# Patient Record
Sex: FEMALE | Race: WHITE | Employment: UNEMPLOYED | URBAN - METROPOLITAN AREA
[De-identification: names, ages, dates, MRNs, and addresses within clinical notes are randomized per-mention and may not be internally consistent; named-entity substitution may affect disease eponyms.]

---

## 2018-12-29 ENCOUNTER — APPOINTMENT (OUTPATIENT)
Dept: CT IMAGING | Age: 83
End: 2018-12-29
Payer: MEDICARE

## 2018-12-29 ENCOUNTER — HOSPITAL ENCOUNTER (EMERGENCY)
Age: 83
Discharge: HOME | End: 2018-12-29
Attending: FAMILY MEDICINE
Payer: MEDICARE

## 2018-12-29 ENCOUNTER — APPOINTMENT (OUTPATIENT)
Dept: GENERAL RADIOLOGY | Age: 83
End: 2018-12-29
Payer: MEDICARE

## 2018-12-29 VITALS
TEMPERATURE: 98 F | SYSTOLIC BLOOD PRESSURE: 140 MMHG | HEART RATE: 84 BPM | BODY MASS INDEX: 22.22 KG/M2 | WEIGHT: 150 LBS | DIASTOLIC BLOOD PRESSURE: 78 MMHG | OXYGEN SATURATION: 96 % | HEIGHT: 69 IN | RESPIRATION RATE: 18 BRPM

## 2018-12-29 DIAGNOSIS — S09.90XA CLOSED HEAD INJURY, INITIAL ENCOUNTER: Primary | ICD-10-CM
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
## 2018-12-29 DIAGNOSIS — S50.812A ABRASION OF LEFT FOREARM, INITIAL ENCOUNTER: ICD-10-CM

## 2018-12-29 DIAGNOSIS — S81.812A NONINFECTED SKIN TEAR OF LEFT LEG, INITIAL ENCOUNTER: ICD-10-CM

## 2018-12-29 DIAGNOSIS — S93.601A SPRAIN OF RIGHT FOOT, INITIAL ENCOUNTER: ICD-10-CM

## 2018-12-29 DIAGNOSIS — S81.811A LACERATION OF SKIN OF RIGHT LOWER LEG, INITIAL ENCOUNTER: ICD-10-CM

## 2018-12-29 DIAGNOSIS — S13.9XXA CERVICAL SPRAIN, INITIAL ENCOUNTER: ICD-10-CM

## 2018-12-29 PROCEDURE — 6370000000 HC RX 637 (ALT 250 FOR IP): Performed by: FAMILY MEDICINE

## 2018-12-29 PROCEDURE — 2500000003 HC RX 250 WO HCPCS

## 2018-12-29 PROCEDURE — 70450 CT HEAD/BRAIN W/O DYE: CPT

## 2018-12-29 PROCEDURE — 99284 EMERGENCY DEPT VISIT MOD MDM: CPT

## 2018-12-29 PROCEDURE — 72125 CT NECK SPINE W/O DYE: CPT

## 2018-12-29 PROCEDURE — 73630 X-RAY EXAM OF FOOT: CPT

## 2018-12-29 PROCEDURE — 6370000000 HC RX 637 (ALT 250 FOR IP)

## 2018-12-29 PROCEDURE — 73590 X-RAY EXAM OF LOWER LEG: CPT

## 2018-12-29 RX ORDER — DIAPER,BRIEF,INFANT-TODD,DISP
EACH MISCELLANEOUS ONCE
Status: DISCONTINUED | OUTPATIENT
Start: 2018-12-29 | End: 2018-12-29 | Stop reason: HOSPADM

## 2018-12-29 RX ORDER — ACETAMINOPHEN 500 MG
TABLET ORAL
Status: COMPLETED
Start: 2018-12-29 | End: 2018-12-29

## 2018-12-29 RX ORDER — LIDOCAINE HYDROCHLORIDE AND EPINEPHRINE 10; 10 MG/ML; UG/ML
INJECTION, SOLUTION INFILTRATION; PERINEURAL
Status: COMPLETED
Start: 2018-12-29 | End: 2018-12-29

## 2018-12-29 RX ORDER — DIAPER,BRIEF,INFANT-TODD,DISP
EACH MISCELLANEOUS ONCE
Status: COMPLETED | OUTPATIENT
Start: 2018-12-29 | End: 2018-12-29

## 2018-12-29 RX ADMIN — LIDOCAINE HYDROCHLORIDE,EPINEPHRINE BITARTRATE 20 ML: 10; .01 INJECTION, SOLUTION INFILTRATION; PERINEURAL at 13:30

## 2018-12-29 RX ADMIN — BACITRACIN ZINC 1 G: 500 OINTMENT TOPICAL at 13:37

## 2018-12-29 RX ADMIN — ACETAMINOPHEN 1000 MG: 500 TABLET ORAL at 14:15

## 2018-12-29 ASSESSMENT — PAIN DESCRIPTION - PROGRESSION
CLINICAL_PROGRESSION: NOT CHANGED
CLINICAL_PROGRESSION: GRADUALLY IMPROVING

## 2018-12-29 ASSESSMENT — PAIN DESCRIPTION - DESCRIPTORS
DESCRIPTORS: CONSTANT;ACHING
DESCRIPTORS: ACHING;DISCOMFORT;SHARP

## 2018-12-29 ASSESSMENT — PAIN DESCRIPTION - FREQUENCY: FREQUENCY: CONTINUOUS

## 2018-12-29 ASSESSMENT — PAIN DESCRIPTION - PAIN TYPE
TYPE: ACUTE PAIN
TYPE: ACUTE PAIN

## 2018-12-29 ASSESSMENT — PAIN SCALES - GENERAL
PAINLEVEL_OUTOF10: 8
PAINLEVEL_OUTOF10: 8
PAINLEVEL_OUTOF10: 6
PAINLEVEL_OUTOF10: 8

## 2018-12-29 ASSESSMENT — PAIN DESCRIPTION - ONSET
ONSET: SUDDEN
ONSET: GRADUAL

## 2018-12-29 ASSESSMENT — PAIN DESCRIPTION - LOCATION: LOCATION: GENERALIZED
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
## 2018-12-31 PROBLEM — S81.811A LACERATION OF SKIN OF RIGHT LOWER LEG: Status: ACTIVE | Noted: 2018-12-31

## 2018-12-31 PROBLEM — S09.90XA CLOSED HEAD INJURY: Status: ACTIVE | Noted: 2018-12-31

## 2018-12-31 PROBLEM — S13.9XXA CERVICAL SPRAIN, INITIAL ENCOUNTER: Status: ACTIVE | Noted: 2018-12-31

## 2018-12-31 PROBLEM — S81.812A NONINFECTED SKIN TEAR OF LEFT LEG, INITIAL ENCOUNTER: Status: ACTIVE | Noted: 2018-12-31

## 2018-12-31 PROBLEM — S93.601A SPRAIN OF RIGHT FOOT: Status: ACTIVE | Noted: 2018-12-31

## 2018-12-31 PROBLEM — S50.812A ABRASION OF LEFT FOREARM: Status: ACTIVE | Noted: 2018-12-31
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
## 2019-06-29 ENCOUNTER — APPOINTMENT (OUTPATIENT)
Dept: CT IMAGING | Age: 84
DRG: 493 | End: 2019-06-29
Payer: MEDICARE

## 2019-06-29 ENCOUNTER — APPOINTMENT (OUTPATIENT)
Dept: GENERAL RADIOLOGY | Age: 84
DRG: 493 | End: 2019-06-29
Payer: MEDICARE

## 2019-06-29 ENCOUNTER — HOSPITAL ENCOUNTER (INPATIENT)
Age: 84
LOS: 3 days | Discharge: NURSING, REHAB OR LONG-TERM CARE | DRG: 493 | End: 2019-07-02
Attending: EMERGENCY MEDICINE | Admitting: ORTHOPAEDIC SURGERY
Payer: MEDICARE

## 2019-06-29 DIAGNOSIS — S82.841A CLOSED BIMALLEOLAR FRACTURE OF RIGHT ANKLE, INITIAL ENCOUNTER: ICD-10-CM

## 2019-06-29 DIAGNOSIS — S09.90XA INJURY OF HEAD, INITIAL ENCOUNTER: ICD-10-CM

## 2019-06-29 DIAGNOSIS — T14.8XXA FRACTURE: ICD-10-CM
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
## 2019-06-29 DIAGNOSIS — S82.842A ANKLE FRACTURE, BIMALLEOLAR, CLOSED, LEFT, INITIAL ENCOUNTER: ICD-10-CM

## 2019-06-29 PROBLEM — S13.9XXA CERVICAL SPRAIN, INITIAL ENCOUNTER: Status: RESOLVED | Noted: 2018-12-31 | Resolved: 2019-06-29

## 2019-06-29 PROBLEM — S81.812A NONINFECTED SKIN TEAR OF LEFT LEG, INITIAL ENCOUNTER: Status: RESOLVED | Noted: 2018-12-31 | Resolved: 2019-06-29

## 2019-06-29 PROBLEM — S81.811A LACERATION OF SKIN OF RIGHT LOWER LEG: Status: RESOLVED | Noted: 2018-12-31 | Resolved: 2019-06-29

## 2019-06-29 PROBLEM — S93.601A SPRAIN OF RIGHT FOOT: Status: RESOLVED | Noted: 2018-12-31 | Resolved: 2019-06-29

## 2019-06-29 PROBLEM — S50.812A ABRASION OF LEFT FOREARM: Status: RESOLVED | Noted: 2018-12-31 | Resolved: 2019-06-29

## 2019-06-29 PROBLEM — I10 ESSENTIAL HYPERTENSION: Chronic | Status: ACTIVE | Noted: 2019-06-29

## 2019-06-29 PROBLEM — Z86.711 HISTORY OF PULMONARY EMBOLUS (PE): Chronic | Status: ACTIVE | Noted: 2019-06-29

## 2019-06-29 LAB
ABO/RH: NORMAL
ALBUMIN SERPL-MCNC: 3.8 G/DL (ref 3.5–5.2)
ALP BLD-CCNC: 60 U/L (ref 35–104)
ALT SERPL-CCNC: 15 U/L (ref 0–32)
ANION GAP SERPL CALCULATED.3IONS-SCNC: 11 MMOL/L (ref 7–16)
ANTIBODY SCREEN: NORMAL
APTT: 31.1 SEC (ref 24.5–35.1)
AST SERPL-CCNC: 22 U/L (ref 0–31)
BACTERIA: ABNORMAL /HPF
BASOPHILS ABSOLUTE: 0.03 E9/L (ref 0–0.2)
BASOPHILS RELATIVE PERCENT: 0.3 % (ref 0–2)
BILIRUB SERPL-MCNC: 1.2 MG/DL (ref 0–1.2)
BILIRUBIN URINE: NEGATIVE
BLOOD, URINE: ABNORMAL
BUN BLDV-MCNC: 15 MG/DL (ref 8–23)
CALCIUM SERPL-MCNC: 9 MG/DL (ref 8.6–10.2)
CHLORIDE BLD-SCNC: 103 MMOL/L (ref 98–107)
CLARITY: CLEAR
CO2: 27 MMOL/L (ref 22–29)
COLOR: YELLOW
CREAT SERPL-MCNC: 1.1 MG/DL (ref 0.5–1)
EOSINOPHILS ABSOLUTE: 0.12 E9/L (ref 0.05–0.5)
EOSINOPHILS RELATIVE PERCENT: 1.3 % (ref 0–6)
GFR AFRICAN AMERICAN: 57
GFR NON-AFRICAN AMERICAN: 47 ML/MIN/1.73
GLUCOSE BLD-MCNC: 94 MG/DL (ref 74–99)
GLUCOSE URINE: NEGATIVE MG/DL
HCT VFR BLD CALC: 35.6 % (ref 34–48)
HEMOGLOBIN: 11.9 G/DL (ref 11.5–15.5)
IMMATURE GRANULOCYTES #: 0.04 E9/L
IMMATURE GRANULOCYTES %: 0.4 % (ref 0–5)
INR BLD: 1.1
KETONES, URINE: ABNORMAL MG/DL
LEUKOCYTE ESTERASE, URINE: NEGATIVE
LYMPHOCYTES ABSOLUTE: 2.2 E9/L (ref 1.5–4)
LYMPHOCYTES RELATIVE PERCENT: 24.3 % (ref 20–42)
MCH RBC QN AUTO: 32.6 PG (ref 26–35)
MCHC RBC AUTO-ENTMCNC: 33.4 % (ref 32–34.5)
MCV RBC AUTO: 97.5 FL (ref 80–99.9)
MONOCYTES ABSOLUTE: 0.91 E9/L (ref 0.1–0.95)
MONOCYTES RELATIVE PERCENT: 10 % (ref 2–12)
NEUTROPHILS ABSOLUTE: 5.77 E9/L (ref 1.8–7.3)
NEUTROPHILS RELATIVE PERCENT: 63.7 % (ref 43–80)
NITRITE, URINE: NEGATIVE
PDW BLD-RTO: 14.6 FL (ref 11.5–15)
PH UA: 5.5 (ref 5–9)
PLATELET # BLD: 158 E9/L (ref 130–450)
PMV BLD AUTO: 9.8 FL (ref 7–12)
POTASSIUM REFLEX MAGNESIUM: 4.1 MMOL/L (ref 3.5–5)
PROTEIN UA: NEGATIVE MG/DL
PROTHROMBIN TIME: 13.1 SEC (ref 9.3–12.4)
RBC # BLD: 3.65 E12/L (ref 3.5–5.5)
RBC UA: ABNORMAL /HPF (ref 0–2)
SODIUM BLD-SCNC: 141 MMOL/L (ref 132–146)
SPECIFIC GRAVITY UA: 1.01 (ref 1–1.03)
TOTAL PROTEIN: 6.3 G/DL (ref 6.4–8.3)
TROPONIN: <0.01 NG/ML (ref 0–0.03)
UROBILINOGEN, URINE: 0.2 E.U./DL
WBC # BLD: 9.1 E9/L (ref 4.5–11.5)
WBC UA: ABNORMAL /HPF (ref 0–5)

## 2019-06-29 PROCEDURE — 85730 THROMBOPLASTIN TIME PARTIAL: CPT

## 2019-06-29 PROCEDURE — 73590 X-RAY EXAM OF LOWER LEG: CPT

## 2019-06-29 PROCEDURE — 2500000003 HC RX 250 WO HCPCS

## 2019-06-29 PROCEDURE — 86850 RBC ANTIBODY SCREEN: CPT

## 2019-06-29 PROCEDURE — 72125 CT NECK SPINE W/O DYE: CPT

## 2019-06-29 PROCEDURE — 86901 BLOOD TYPING SEROLOGIC RH(D): CPT

## 2019-06-29 PROCEDURE — 80053 COMPREHEN METABOLIC PANEL: CPT

## 2019-06-29 PROCEDURE — 87088 URINE BACTERIA CULTURE: CPT

## 2019-06-29 PROCEDURE — 85025 COMPLETE CBC W/AUTO DIFF WBC: CPT

## 2019-06-29 PROCEDURE — 6370000000 HC RX 637 (ALT 250 FOR IP): Performed by: INTERNAL MEDICINE

## 2019-06-29 PROCEDURE — 96375 TX/PRO/DX INJ NEW DRUG ADDON: CPT

## 2019-06-29 PROCEDURE — 99285 EMERGENCY DEPT VISIT HI MDM: CPT

## 2019-06-29 PROCEDURE — 2580000003 HC RX 258: Performed by: INTERNAL MEDICINE

## 2019-06-29 PROCEDURE — 93005 ELECTROCARDIOGRAM TRACING: CPT | Performed by: EMERGENCY MEDICINE

## 2019-06-29 PROCEDURE — 81001 URINALYSIS AUTO W/SCOPE: CPT

## 2019-06-29 PROCEDURE — 6360000002 HC RX W HCPCS: Performed by: STUDENT IN AN ORGANIZED HEALTH CARE EDUCATION/TRAINING PROGRAM

## 2019-06-29 PROCEDURE — 86900 BLOOD TYPING SEROLOGIC ABO: CPT

## 2019-06-29 PROCEDURE — 70450 CT HEAD/BRAIN W/O DYE: CPT

## 2019-06-29 PROCEDURE — 85610 PROTHROMBIN TIME: CPT

## 2019-06-29 PROCEDURE — 6360000002 HC RX W HCPCS: Performed by: INTERNAL MEDICINE

## 2019-06-29 PROCEDURE — 84484 ASSAY OF TROPONIN QUANT: CPT

## 2019-06-29 PROCEDURE — 96374 THER/PROPH/DIAG INJ IV PUSH: CPT

## 2019-06-29 PROCEDURE — 6360000002 HC RX W HCPCS: Performed by: EMERGENCY MEDICINE

## 2019-06-29 PROCEDURE — 36415 COLL VENOUS BLD VENIPUNCTURE: CPT

## 2019-06-29 PROCEDURE — 1200000000 HC SEMI PRIVATE

## 2019-06-29 PROCEDURE — 73610 X-RAY EXAM OF ANKLE: CPT

## 2019-06-29 PROCEDURE — 71045 X-RAY EXAM CHEST 1 VIEW: CPT

## 2019-06-29 RX ORDER — OXYCODONE HYDROCHLORIDE AND ACETAMINOPHEN 5; 325 MG/1; MG/1
1 TABLET ORAL EVERY 4 HOURS PRN
Status: DISCONTINUED | OUTPATIENT
Start: 2019-06-29 | End: 2019-06-29

## 2019-06-29 RX ORDER — TRAMADOL HYDROCHLORIDE 50 MG/1
50 TABLET ORAL EVERY 6 HOURS PRN
Status: DISCONTINUED | OUTPATIENT
Start: 2019-06-29 | End: 2019-07-02 | Stop reason: HOSPADM

## 2019-06-29 RX ORDER — LIDOCAINE HYDROCHLORIDE 10 MG/ML
INJECTION, SOLUTION EPIDURAL; INFILTRATION; INTRACAUDAL; PERINEURAL
Status: COMPLETED
Start: 2019-06-29 | End: 2019-06-29

## 2019-06-29 RX ORDER — TRAMADOL HYDROCHLORIDE 50 MG/1
100 TABLET ORAL EVERY 6 HOURS PRN
Status: DISCONTINUED | OUTPATIENT
Start: 2019-06-29 | End: 2019-07-02 | Stop reason: HOSPADM

## 2019-06-29 RX ORDER — CHOLECALCIFEROL (VITAMIN D3) 1250 MCG
1 CAPSULE ORAL
COMMUNITY
End: 2019-07-29 | Stop reason: SDUPTHER

## 2019-06-29 RX ORDER — ACETAMINOPHEN 325 MG/1
650 TABLET ORAL EVERY 4 HOURS PRN
Status: DISCONTINUED | OUTPATIENT
Start: 2019-06-29 | End: 2019-07-02 | Stop reason: HOSPADM

## 2019-06-29 RX ORDER — PREDNISONE 1 MG/1
5 TABLET ORAL DAILY
Status: DISCONTINUED | OUTPATIENT
Start: 2019-06-29 | End: 2019-07-02 | Stop reason: HOSPADM

## 2019-06-29 RX ORDER — DOCUSATE SODIUM 100 MG/1
100 CAPSULE, LIQUID FILLED ORAL 2 TIMES DAILY
Status: DISCONTINUED | OUTPATIENT
Start: 2019-06-29 | End: 2019-07-02 | Stop reason: HOSPADM

## 2019-06-29 RX ORDER — PANTOPRAZOLE SODIUM 40 MG/1
40 TABLET, DELAYED RELEASE ORAL DAILY
Status: DISCONTINUED | OUTPATIENT
Start: 2019-06-29 | End: 2019-07-02 | Stop reason: HOSPADM

## 2019-06-29 RX ORDER — SODIUM CHLORIDE 0.9 % (FLUSH) 0.9 %
10 SYRINGE (ML) INJECTION EVERY 12 HOURS SCHEDULED
Status: DISCONTINUED | OUTPATIENT
Start: 2019-06-29 | End: 2019-07-02 | Stop reason: HOSPADM

## 2019-06-29 RX ORDER — DIPHENHYDRAMINE HCL 12.5MG/5ML
25 LIQUID (ML) ORAL EVERY 6 HOURS PRN
Status: DISCONTINUED | OUTPATIENT
Start: 2019-06-29 | End: 2019-07-02 | Stop reason: HOSPADM

## 2019-06-29 RX ORDER — SULFAMETHOXAZOLE AND TRIMETHOPRIM 800; 160 MG/1; MG/1
1 TABLET ORAL EVERY OTHER DAY
Status: DISCONTINUED | OUTPATIENT
Start: 2019-06-29 | End: 2019-06-29

## 2019-06-29 RX ORDER — FENTANYL CITRATE 50 UG/ML
25 INJECTION, SOLUTION INTRAMUSCULAR; INTRAVENOUS ONCE
Status: COMPLETED | OUTPATIENT
Start: 2019-06-29 | End: 2019-06-29

## 2019-06-29 RX ORDER — CITALOPRAM 20 MG/1
20 TABLET ORAL DAILY
Status: DISCONTINUED | OUTPATIENT
Start: 2019-06-29 | End: 2019-07-02 | Stop reason: HOSPADM

## 2019-06-29 RX ORDER — SODIUM CHLORIDE 0.9 % (FLUSH) 0.9 %
10 SYRINGE (ML) INJECTION PRN
Status: DISCONTINUED | OUTPATIENT
Start: 2019-06-29 | End: 2019-07-02 | Stop reason: HOSPADM

## 2019-06-29 RX ORDER — SULFAMETHOXAZOLE AND TRIMETHOPRIM 800; 160 MG/1; MG/1
1 TABLET ORAL EVERY OTHER DAY
COMMUNITY
End: 2019-09-06

## 2019-06-29 RX ORDER — LISINOPRIL 10 MG/1
10 TABLET ORAL DAILY
Status: DISCONTINUED | OUTPATIENT
Start: 2019-06-29 | End: 2019-07-02 | Stop reason: HOSPADM

## 2019-06-29 RX ORDER — ONDANSETRON 2 MG/ML
4 INJECTION INTRAMUSCULAR; INTRAVENOUS EVERY 6 HOURS PRN
Status: DISCONTINUED | OUTPATIENT
Start: 2019-06-29 | End: 2019-07-02 | Stop reason: HOSPADM

## 2019-06-29 RX ORDER — MIDAZOLAM HYDROCHLORIDE 1 MG/ML
2 INJECTION INTRAMUSCULAR; INTRAVENOUS ONCE
Status: COMPLETED | OUTPATIENT
Start: 2019-06-29 | End: 2019-06-29

## 2019-06-29 RX ORDER — OXYCODONE HYDROCHLORIDE AND ACETAMINOPHEN 5; 325 MG/1; MG/1
2 TABLET ORAL EVERY 4 HOURS PRN
Status: DISCONTINUED | OUTPATIENT
Start: 2019-06-29 | End: 2019-06-29

## 2019-06-29 RX ORDER — LIDOCAINE HYDROCHLORIDE 10 MG/ML
20 INJECTION, SOLUTION EPIDURAL; INFILTRATION; INTRACAUDAL; PERINEURAL SEE ADMIN INSTRUCTIONS
Status: DISCONTINUED | OUTPATIENT
Start: 2019-06-29 | End: 2019-07-02 | Stop reason: HOSPADM

## 2019-06-29 RX ADMIN — TRAMADOL HYDROCHLORIDE 100 MG: 50 TABLET, FILM COATED ORAL at 22:37

## 2019-06-29 RX ADMIN — LIDOCAINE HYDROCHLORIDE 20 ML: 10 INJECTION, SOLUTION EPIDURAL; INFILTRATION; INTRACAUDAL; PERINEURAL at 12:05

## 2019-06-29 RX ADMIN — ACETAMINOPHEN 650 MG: 325 TABLET, FILM COATED ORAL at 23:59

## 2019-06-29 RX ADMIN — Medication 10 ML: at 20:24

## 2019-06-29 RX ADMIN — PREDNISONE 5 MG: 5 TABLET ORAL at 20:23

## 2019-06-29 RX ADMIN — ENOXAPARIN SODIUM 40 MG: 40 INJECTION SUBCUTANEOUS at 16:37

## 2019-06-29 RX ADMIN — DIPHENHYDRAMINE HYDROCHLORIDE 25 MG: 25 SOLUTION ORAL at 22:37

## 2019-06-29 RX ADMIN — FENTANYL CITRATE 25 MCG: 0.05 INJECTION, SOLUTION INTRAMUSCULAR; INTRAVENOUS at 10:42

## 2019-06-29 RX ADMIN — CITALOPRAM 20 MG: 20 TABLET, FILM COATED ORAL at 20:23

## 2019-06-29 RX ADMIN — MIDAZOLAM 2 MG: 1 INJECTION INTRAMUSCULAR; INTRAVENOUS at 12:00

## 2019-06-29 RX ADMIN — PANTOPRAZOLE SODIUM 40 MG: 40 TABLET, DELAYED RELEASE ORAL at 16:37

## 2019-06-29 RX ADMIN — ACETAMINOPHEN 650 MG: 325 TABLET, FILM COATED ORAL at 16:43

## 2019-06-29 RX ADMIN — DOCUSATE SODIUM 100 MG: 100 CAPSULE, LIQUID FILLED ORAL at 20:24

## 2019-06-29 ASSESSMENT — PAIN SCALES - GENERAL
PAINLEVEL_OUTOF10: 7
PAINLEVEL_OUTOF10: 0
PAINLEVEL_OUTOF10: 7
PAINLEVEL_OUTOF10: 7
PAINLEVEL_OUTOF10: 9
PAINLEVEL_OUTOF10: 0
PAINLEVEL_OUTOF10: 5
PAINLEVEL_OUTOF10: 9

## 2019-06-29 ASSESSMENT — ENCOUNTER SYMPTOMS
COUGH: 0
BLOOD IN STOOL: 0
NAUSEA: 0
ABDOMINAL PAIN: 0
VOMITING: 0
RHINORRHEA: 0
DIARRHEA: 0
BACK PAIN: 0
CONSTIPATION: 0
SHORTNESS OF BREATH: 0

## 2019-06-29 ASSESSMENT — PAIN DESCRIPTION - PAIN TYPE
TYPE: ACUTE PAIN

## 2019-06-29 ASSESSMENT — PAIN DESCRIPTION - FREQUENCY
FREQUENCY: CONTINUOUS

## 2019-06-29 ASSESSMENT — PAIN DESCRIPTION - ORIENTATION
ORIENTATION: RIGHT

## 2019-06-29 ASSESSMENT — PAIN DESCRIPTION - LOCATION
LOCATION: ANKLE

## 2019-06-29 ASSESSMENT — PAIN DESCRIPTION - DESCRIPTORS
DESCRIPTORS: ACHING;CONSTANT;DISCOMFORT
DESCRIPTORS: ACHING;DULL;DISCOMFORT
DESCRIPTORS: ACHING;CONSTANT;DISCOMFORT

## 2019-06-29 ASSESSMENT — PAIN DESCRIPTION - ONSET
ONSET: ON-GOING
ONSET: ON-GOING

## 2019-06-29 NOTE — CONSULTS
perfused  Sensation grossly intact superficial/deep peroneal,saphenous,sural,tibial n. distributions  · +GS/TA/EHL    Secondary Exam:   · bilateralUE: No obvious signs of trauma. No tenderness to palpation. Patient states left arm is sore due to IV placement. No deformities noted    · leftLE: No obvious signs of trauma. No tenderness to palpation. Negative logroll. No deformities noted    · Pelvis: -TTP, -Log roll, -Heel strike     DATA:    CBC:   Lab Results   Component Value Date    WBC 12.6 10/22/2016    RBC 3.98 10/22/2016    HGB 12.4 10/22/2016    HCT 37.4 10/22/2016    MCV 94.2 10/22/2016    MCH 31.3 10/22/2016    MCHC 33.2 10/22/2016    RDW 14.9 10/22/2016     10/22/2016    MPV 8.1 10/22/2016     PT/INR:    Lab Results   Component Value Date    PROTIME 12.1 10/21/2016    PROTIME 31.6 06/15/2016    INR 1.1 10/21/2016       Radiology Review:  06/29/19 - XR right ankle- oblique fracture of distal fibula at the level of the tibial plafond. Oblique to transverse medial male fracture. There is lateral subluxation of the talus and increased medial clear space    IMPRESSION:   ·  Closed, Right Bimalleolar Ankle Fracture    PLAN:  After informed consent was verbally obtained. Ankle underwent closed reduction with application of well padded 3-sided posterior splint. Neurovascular status was unchanged  Non-weight bearing to injured extremity  Pain medication per ED  Continue ice and elevation to decrease swelling  Medical admit   Nothing by mouth after midnight  Treatment consent  Preoperative labs and imaging  Plan for or 6/29/19  · Discuss with Dr. Minoo Higgins    Patient seen and examined  As above  See dictated Note. Thank Rand Ballesteros MD  Risk, benefits and options discussed with patient. she has verbalized understanding of options.  The possibility of complications were also discussed to include but not limited to nerve damage, infection, problems with healing, vascular injury, chronic pain, stiffness,

## 2019-06-30 ENCOUNTER — ANESTHESIA EVENT (OUTPATIENT)
Dept: OPERATING ROOM | Age: 84
DRG: 493 | End: 2019-06-30
Payer: MEDICARE

## 2019-06-30 ENCOUNTER — ANESTHESIA (OUTPATIENT)
Dept: OPERATING ROOM | Age: 84
DRG: 493 | End: 2019-06-30
Payer: MEDICARE

## 2019-06-30 ENCOUNTER — APPOINTMENT (OUTPATIENT)
Dept: GENERAL RADIOLOGY | Age: 84
DRG: 493 | End: 2019-06-30
Payer: MEDICARE

## 2019-06-30 VITALS — DIASTOLIC BLOOD PRESSURE: 78 MMHG | TEMPERATURE: 97.9 F | OXYGEN SATURATION: 100 % | SYSTOLIC BLOOD PRESSURE: 154 MMHG

## 2019-06-30 LAB
EKG ATRIAL RATE: 73 BPM
EKG P AXIS: 54 DEGREES
EKG P-R INTERVAL: 172 MS
EKG Q-T INTERVAL: 386 MS
EKG QRS DURATION: 86 MS
EKG QTC CALCULATION (BAZETT): 425 MS
EKG R AXIS: 6 DEGREES
EKG T AXIS: 7 DEGREES
EKG VENTRICULAR RATE: 73 BPM

## 2019-06-30 PROCEDURE — 6360000002 HC RX W HCPCS: Performed by: STUDENT IN AN ORGANIZED HEALTH CARE EDUCATION/TRAINING PROGRAM

## 2019-06-30 PROCEDURE — 2500000003 HC RX 250 WO HCPCS

## 2019-06-30 PROCEDURE — 7100000001 HC PACU RECOVERY - ADDTL 15 MIN: Performed by: ORTHOPAEDIC SURGERY

## 2019-06-30 PROCEDURE — 0QSG04Z REPOSITION RIGHT TIBIA WITH INTERNAL FIXATION DEVICE, OPEN APPROACH: ICD-10-PCS | Performed by: ORTHOPAEDIC SURGERY

## 2019-06-30 PROCEDURE — 2500000003 HC RX 250 WO HCPCS: Performed by: ORTHOPAEDIC SURGERY

## 2019-06-30 PROCEDURE — 3600000015 HC SURGERY LEVEL 5 ADDTL 15MIN: Performed by: ORTHOPAEDIC SURGERY

## 2019-06-30 PROCEDURE — 6370000000 HC RX 637 (ALT 250 FOR IP): Performed by: STUDENT IN AN ORGANIZED HEALTH CARE EDUCATION/TRAINING PROGRAM

## 2019-06-30 PROCEDURE — 6360000002 HC RX W HCPCS

## 2019-06-30 PROCEDURE — 6360000002 HC RX W HCPCS: Performed by: INTERNAL MEDICINE

## 2019-06-30 PROCEDURE — 2580000003 HC RX 258

## 2019-06-30 PROCEDURE — 2580000003 HC RX 258: Performed by: STUDENT IN AN ORGANIZED HEALTH CARE EDUCATION/TRAINING PROGRAM

## 2019-06-30 PROCEDURE — C1713 ANCHOR/SCREW BN/BN,TIS/BN: HCPCS | Performed by: ORTHOPAEDIC SURGERY

## 2019-06-30 PROCEDURE — 1200000000 HC SEMI PRIVATE

## 2019-06-30 PROCEDURE — 3700000000 HC ANESTHESIA ATTENDED CARE: Performed by: ORTHOPAEDIC SURGERY

## 2019-06-30 PROCEDURE — 6360000002 HC RX W HCPCS: Performed by: NURSE ANESTHETIST, CERTIFIED REGISTERED

## 2019-06-30 PROCEDURE — 2580000003 HC RX 258: Performed by: INTERNAL MEDICINE

## 2019-06-30 PROCEDURE — 93010 ELECTROCARDIOGRAM REPORT: CPT | Performed by: INTERNAL MEDICINE

## 2019-06-30 PROCEDURE — 2709999900 HC NON-CHARGEABLE SUPPLY: Performed by: ORTHOPAEDIC SURGERY

## 2019-06-30 PROCEDURE — 6370000000 HC RX 637 (ALT 250 FOR IP): Performed by: INTERNAL MEDICINE

## 2019-06-30 PROCEDURE — 3700000001 HC ADD 15 MINUTES (ANESTHESIA): Performed by: ORTHOPAEDIC SURGERY

## 2019-06-30 PROCEDURE — 3600000005 HC SURGERY LEVEL 5 BASE: Performed by: ORTHOPAEDIC SURGERY

## 2019-06-30 PROCEDURE — 2720000010 HC SURG SUPPLY STERILE: Performed by: ORTHOPAEDIC SURGERY

## 2019-06-30 PROCEDURE — 3209999900 FLUORO FOR SURGICAL PROCEDURES

## 2019-06-30 PROCEDURE — 6360000002 HC RX W HCPCS: Performed by: ANESTHESIOLOGY

## 2019-06-30 PROCEDURE — 73610 X-RAY EXAM OF ANKLE: CPT

## 2019-06-30 PROCEDURE — 7100000000 HC PACU RECOVERY - FIRST 15 MIN: Performed by: ORTHOPAEDIC SURGERY

## 2019-06-30 DEVICE — SCREW BNE L85MM DIA3.5MM CORT S STL ST FULL THRD HEX RECESS: Type: IMPLANTABLE DEVICE | Site: TIBIA | Status: FUNCTIONAL

## 2019-06-30 RX ORDER — PROMETHAZINE HYDROCHLORIDE 25 MG/ML
6.25 INJECTION, SOLUTION INTRAMUSCULAR; INTRAVENOUS
Status: DISCONTINUED | OUTPATIENT
Start: 2019-06-30 | End: 2019-06-30 | Stop reason: HOSPADM

## 2019-06-30 RX ORDER — LIDOCAINE HYDROCHLORIDE 20 MG/ML
INJECTION, SOLUTION INTRAVENOUS PRN
Status: DISCONTINUED | OUTPATIENT
Start: 2019-06-30 | End: 2019-06-30 | Stop reason: SDUPTHER

## 2019-06-30 RX ORDER — GLYCOPYRROLATE 1 MG/5 ML
SYRINGE (ML) INTRAVENOUS PRN
Status: DISCONTINUED | OUTPATIENT
Start: 2019-06-30 | End: 2019-06-30 | Stop reason: SDUPTHER

## 2019-06-30 RX ORDER — PHENYLEPHRINE HYDROCHLORIDE 10 MG/ML
INJECTION INTRAVENOUS PRN
Status: DISCONTINUED | OUTPATIENT
Start: 2019-06-30 | End: 2019-06-30 | Stop reason: SDUPTHER

## 2019-06-30 RX ORDER — CEFAZOLIN SODIUM 1 G/3ML
INJECTION, POWDER, FOR SOLUTION INTRAMUSCULAR; INTRAVENOUS PRN
Status: DISCONTINUED | OUTPATIENT
Start: 2019-06-30 | End: 2019-06-30 | Stop reason: SDUPTHER

## 2019-06-30 RX ORDER — ROCURONIUM BROMIDE 10 MG/ML
INJECTION, SOLUTION INTRAVENOUS PRN
Status: DISCONTINUED | OUTPATIENT
Start: 2019-06-30 | End: 2019-06-30 | Stop reason: SDUPTHER

## 2019-06-30 RX ORDER — DEXAMETHASONE SODIUM PHOSPHATE 10 MG/ML
INJECTION INTRAMUSCULAR; INTRAVENOUS PRN
Status: DISCONTINUED | OUTPATIENT
Start: 2019-06-30 | End: 2019-06-30 | Stop reason: SDUPTHER

## 2019-06-30 RX ORDER — PROPOFOL 10 MG/ML
INJECTION, EMULSION INTRAVENOUS PRN
Status: DISCONTINUED | OUTPATIENT
Start: 2019-06-30 | End: 2019-06-30 | Stop reason: SDUPTHER

## 2019-06-30 RX ORDER — DIPHENHYDRAMINE HYDROCHLORIDE 50 MG/ML
12.5 INJECTION INTRAMUSCULAR; INTRAVENOUS
Status: DISCONTINUED | OUTPATIENT
Start: 2019-06-30 | End: 2019-06-30 | Stop reason: HOSPADM

## 2019-06-30 RX ORDER — SODIUM CHLORIDE 9 MG/ML
INJECTION, SOLUTION INTRAVENOUS CONTINUOUS PRN
Status: DISCONTINUED | OUTPATIENT
Start: 2019-06-30 | End: 2019-06-30 | Stop reason: SDUPTHER

## 2019-06-30 RX ORDER — FENTANYL CITRATE 50 UG/ML
INJECTION, SOLUTION INTRAMUSCULAR; INTRAVENOUS PRN
Status: DISCONTINUED | OUTPATIENT
Start: 2019-06-30 | End: 2019-06-30 | Stop reason: SDUPTHER

## 2019-06-30 RX ORDER — ONDANSETRON 2 MG/ML
INJECTION INTRAMUSCULAR; INTRAVENOUS PRN
Status: DISCONTINUED | OUTPATIENT
Start: 2019-06-30 | End: 2019-06-30 | Stop reason: SDUPTHER

## 2019-06-30 RX ORDER — BUPIVACAINE HYDROCHLORIDE 5 MG/ML
INJECTION, SOLUTION EPIDURAL; INTRACAUDAL PRN
Status: DISCONTINUED | OUTPATIENT
Start: 2019-06-30 | End: 2019-06-30 | Stop reason: HOSPADM

## 2019-06-30 RX ORDER — MEPERIDINE HYDROCHLORIDE 50 MG/ML
12.5 INJECTION INTRAMUSCULAR; INTRAVENOUS; SUBCUTANEOUS EVERY 5 MIN PRN
Status: DISCONTINUED | OUTPATIENT
Start: 2019-06-30 | End: 2019-06-30 | Stop reason: HOSPADM

## 2019-06-30 RX ORDER — NEOSTIGMINE METHYLSULFATE 1 MG/ML
INJECTION, SOLUTION INTRAVENOUS PRN
Status: DISCONTINUED | OUTPATIENT
Start: 2019-06-30 | End: 2019-06-30 | Stop reason: SDUPTHER

## 2019-06-30 RX ORDER — ASPIRIN 81 MG/1
81 TABLET ORAL
Status: DISCONTINUED | OUTPATIENT
Start: 2019-07-01 | End: 2019-07-02 | Stop reason: HOSPADM

## 2019-06-30 RX ORDER — FENTANYL CITRATE 50 UG/ML
50 INJECTION, SOLUTION INTRAMUSCULAR; INTRAVENOUS EVERY 5 MIN PRN
Status: DISCONTINUED | OUTPATIENT
Start: 2019-06-30 | End: 2019-06-30 | Stop reason: HOSPADM

## 2019-06-30 RX ORDER — FENTANYL CITRATE 50 UG/ML
25 INJECTION, SOLUTION INTRAMUSCULAR; INTRAVENOUS EVERY 5 MIN PRN
Status: DISCONTINUED | OUTPATIENT
Start: 2019-06-30 | End: 2019-06-30 | Stop reason: HOSPADM

## 2019-06-30 RX ADMIN — DIPHENHYDRAMINE HYDROCHLORIDE 25 MG: 25 SOLUTION ORAL at 21:33

## 2019-06-30 RX ADMIN — DOCUSATE SODIUM 100 MG: 100 CAPSULE, LIQUID FILLED ORAL at 20:02

## 2019-06-30 RX ADMIN — PHENYLEPHRINE HYDROCHLORIDE 25 MCG: 10 INJECTION INTRAVENOUS at 11:27

## 2019-06-30 RX ADMIN — FENTANYL CITRATE 50 MCG: 50 INJECTION, SOLUTION INTRAMUSCULAR; INTRAVENOUS at 12:14

## 2019-06-30 RX ADMIN — Medication 0.6 MG: at 11:54

## 2019-06-30 RX ADMIN — SODIUM CHLORIDE: 9 INJECTION, SOLUTION INTRAVENOUS at 10:35

## 2019-06-30 RX ADMIN — CEFAZOLIN 2000 MG: 1 INJECTION, POWDER, FOR SOLUTION INTRAMUSCULAR; INTRAVENOUS at 10:45

## 2019-06-30 RX ADMIN — PREDNISONE 5 MG: 5 TABLET ORAL at 20:01

## 2019-06-30 RX ADMIN — ROCURONIUM BROMIDE 30 MG: 10 INJECTION, SOLUTION INTRAVENOUS at 10:37

## 2019-06-30 RX ADMIN — FENTANYL CITRATE 50 MCG: 50 INJECTION, SOLUTION INTRAMUSCULAR; INTRAVENOUS at 12:19

## 2019-06-30 RX ADMIN — ONDANSETRON HYDROCHLORIDE 4 MG: 2 INJECTION, SOLUTION INTRAMUSCULAR; INTRAVENOUS at 11:53

## 2019-06-30 RX ADMIN — FENTANYL CITRATE 50 MCG: 0.05 INJECTION, SOLUTION INTRAMUSCULAR; INTRAVENOUS at 12:43

## 2019-06-30 RX ADMIN — Medication 10 ML: at 09:02

## 2019-06-30 RX ADMIN — CITALOPRAM 20 MG: 20 TABLET, FILM COATED ORAL at 20:01

## 2019-06-30 RX ADMIN — PHENYLEPHRINE HYDROCHLORIDE 25 MCG: 10 INJECTION INTRAVENOUS at 11:38

## 2019-06-30 RX ADMIN — PROPOFOL 150 MG: 10 INJECTION, EMULSION INTRAVENOUS at 10:37

## 2019-06-30 RX ADMIN — ONDANSETRON HYDROCHLORIDE 4 MG: 2 SOLUTION INTRAMUSCULAR; INTRAVENOUS at 13:03

## 2019-06-30 RX ADMIN — FENTANYL CITRATE 50 MCG: 50 INJECTION, SOLUTION INTRAMUSCULAR; INTRAVENOUS at 10:37

## 2019-06-30 RX ADMIN — ACETAMINOPHEN 650 MG: 325 TABLET, FILM COATED ORAL at 20:02

## 2019-06-30 RX ADMIN — TRAMADOL HYDROCHLORIDE 100 MG: 50 TABLET, FILM COATED ORAL at 05:07

## 2019-06-30 RX ADMIN — Medication 3 MG: at 11:54

## 2019-06-30 RX ADMIN — Medication 10 ML: at 20:01

## 2019-06-30 RX ADMIN — DEXTROSE MONOHYDRATE 2 G: 50 INJECTION, SOLUTION INTRAVENOUS at 19:20

## 2019-06-30 RX ADMIN — TRAMADOL HYDROCHLORIDE 100 MG: 50 TABLET, FILM COATED ORAL at 20:01

## 2019-06-30 RX ADMIN — Medication 1 LOZENGE: at 20:39

## 2019-06-30 RX ADMIN — LIDOCAINE HYDROCHLORIDE 40 MG: 20 INJECTION, SOLUTION INTRAVENOUS at 10:37

## 2019-06-30 RX ADMIN — DEXAMETHASONE SODIUM PHOSPHATE 10 MG: 10 INJECTION INTRAMUSCULAR; INTRAVENOUS at 10:37

## 2019-06-30 ASSESSMENT — PAIN SCALES - GENERAL
PAINLEVEL_OUTOF10: 0
PAINLEVEL_OUTOF10: 8
PAINLEVEL_OUTOF10: 6
PAINLEVEL_OUTOF10: 0
PAINLEVEL_OUTOF10: 7
PAINLEVEL_OUTOF10: 0
PAINLEVEL_OUTOF10: 8
PAINLEVEL_OUTOF10: 0
PAINLEVEL_OUTOF10: 0
PAINLEVEL_OUTOF10: 7

## 2019-06-30 ASSESSMENT — PAIN DESCRIPTION - LOCATION
LOCATION: ANKLE

## 2019-06-30 ASSESSMENT — PULMONARY FUNCTION TESTS
PIF_VALUE: 23
PIF_VALUE: 21
PIF_VALUE: 24
PIF_VALUE: 21
PIF_VALUE: 0
PIF_VALUE: 21
PIF_VALUE: 21
PIF_VALUE: 20
PIF_VALUE: 21
PIF_VALUE: 20
PIF_VALUE: 21
PIF_VALUE: 21
PIF_VALUE: 41
PIF_VALUE: 21
PIF_VALUE: 21
PIF_VALUE: 20
PIF_VALUE: 15
PIF_VALUE: 21
PIF_VALUE: 14
PIF_VALUE: 20
PIF_VALUE: 21
PIF_VALUE: 20
PIF_VALUE: 20
PIF_VALUE: 21
PIF_VALUE: 22
PIF_VALUE: 21
PIF_VALUE: 22
PIF_VALUE: 12
PIF_VALUE: 21
PIF_VALUE: 21
PIF_VALUE: 40
PIF_VALUE: 22
PIF_VALUE: 21
PIF_VALUE: 23
PIF_VALUE: 3
PIF_VALUE: 3
PIF_VALUE: 21
PIF_VALUE: 14
PIF_VALUE: 21
PIF_VALUE: 2
PIF_VALUE: 20
PIF_VALUE: 21
PIF_VALUE: 20
PIF_VALUE: 1
PIF_VALUE: 21
PIF_VALUE: 20
PIF_VALUE: 20
PIF_VALUE: 15
PIF_VALUE: 22
PIF_VALUE: 2
PIF_VALUE: 20
PIF_VALUE: 21
PIF_VALUE: 18
PIF_VALUE: 20
PIF_VALUE: 21
PIF_VALUE: 17
PIF_VALUE: 21
PIF_VALUE: 20
PIF_VALUE: 2
PIF_VALUE: 19
PIF_VALUE: 21
PIF_VALUE: 3
PIF_VALUE: 22
PIF_VALUE: 20
PIF_VALUE: 0
PIF_VALUE: 21
PIF_VALUE: 21
PIF_VALUE: 22

## 2019-06-30 ASSESSMENT — LIFESTYLE VARIABLES: SMOKING_STATUS: 0

## 2019-06-30 ASSESSMENT — PAIN DESCRIPTION - ONSET
ONSET: AWAKENED FROM SLEEP
ONSET: ON-GOING

## 2019-06-30 ASSESSMENT — PAIN DESCRIPTION - ORIENTATION
ORIENTATION: RIGHT

## 2019-06-30 ASSESSMENT — PAIN DESCRIPTION - DESCRIPTORS
DESCRIPTORS: ACHING;CONSTANT;DISCOMFORT
DESCRIPTORS: PATIENT UNABLE TO DESCRIBE
DESCRIPTORS: ACHING;CONSTANT;DISCOMFORT
DESCRIPTORS: PATIENT UNABLE TO DESCRIBE

## 2019-06-30 ASSESSMENT — PAIN DESCRIPTION - PAIN TYPE
TYPE: SURGICAL PAIN
TYPE: ACUTE PAIN

## 2019-06-30 ASSESSMENT — PAIN DESCRIPTION - FREQUENCY
FREQUENCY: INTERMITTENT
FREQUENCY: CONTINUOUS
FREQUENCY: INTERMITTENT

## 2019-06-30 NOTE — ANESTHESIA PRE PROCEDURE
capsule 100 mg  100 mg Oral BID Wade Smith MD   100 mg at 06/29/19 2024    pantoprazole (PROTONIX) tablet 40 mg  40 mg Oral Daily Wade Smith MD   40 mg at 06/29/19 1637    predniSONE (DELTASONE) tablet 5 mg  5 mg Oral Daily Wade Smith MD   5 mg at 06/29/19 2023    lisinopril (PRINIVIL;ZESTRIL) tablet 10 mg  10 mg Oral Daily Wade Smith MD        sodium chloride flush 0.9 % injection 10 mL  10 mL Intravenous 2 times per day Wade Smith MD   10 mL at 06/30/19 0902    sodium chloride flush 0.9 % injection 10 mL  10 mL Intravenous PRN Wade Smith MD        magnesium hydroxide (MILK OF MAGNESIA) 400 MG/5ML suspension 30 mL  30 mL Oral Daily PRN Wade Smith MD        ondansetron [facility]) injection 4 mg  4 mg Intravenous Q6H PRN Wade Smith MD        enoxaparin (LOVENOX) injection 40 mg  40 mg Subcutaneous Daily Wade Smith MD   Stopped at 06/30/19 0859    acetaminophen (TYLENOL) tablet 650 mg  650 mg Oral Q4H PRN Wade Smith MD   650 mg at 06/29/19 2359    diphenhydrAMINE (BENADRYL) 12.5 MG/5ML elixir 25 mg  25 mg Oral Q6H PRN Wade Smith MD   25 mg at 06/29/19 2237    traMADol (ULTRAM) tablet 50 mg  50 mg Oral Q6H PRN Wade Smith MD        Or    traMADol Dalphine Left) tablet 100 mg  100 mg Oral Q6H PRN Wade Smith MD   100 mg at 06/30/19 0507       Allergies:     Allergies   Allergen Reactions    Codeine Hives and Itching    Penicillins Hives, Itching and Rash     Tolerated cephalosporin in the past       Problem List:    Patient Active Problem List   Diagnosis Code    MVP (mitral valve prolapse) I34.1    Cervical cancer ([facility]) C53.9    GERD (gastroesophageal reflux disease) K21.9    Sjogren's disease ([address] M35.00    ILD (interstitial lung disease) ([address] J84.9    Granulomatosis with polyangiitis ([address] M31.30    History of pulmonary embolus (PE) Z86.711    Essential hypertension I10    Ankle fracture, bimalleolar, closed, left, initial encounter V41.548I       Past Medical History:        Diagnosis Date    FABIO (acute kidney injury) ([facility] 2015    Cervical cancer ([address] 6/3/2015    1970    Chest pain 6/3/2015    Chronic renal impairment, stage 2 (mild) 2015    Erosive osteoarthritis of multiple sites 6/3/2015    2005    Former smoker, stopped smoking in distant past     Gastritis 6/3/2015    GERD (gastroesophageal reflux disease) 6/3/2015    Glucose intolerance (impaired glucose tolerance) 6/3/2015    2004    HTN (hypertension) 6/3/2015    ILD (interstitial lung disease) ([address] 2015    Microscopic polyangiitis ([address] 2015    MVP (mitral valve prolapse) 6/3/2015    1982    Near syncope 6/3/2015    Orthostatic hypotension 2015    Pseudogout of left wrist 6/3/2015    2005    Pulmonary embolus ([address] 2015    BILATERAL    Pulmonary fibrosis ([address] 2015    Pulmonary nodule 2015    Sjogren's disease ([facility] 2015    Volume depletion 2015    Wegener's granulomatosis (granulomatosis with polyangiitis) (HCC)        Past Surgical History:        Procedure Laterality Date    ECHO COMPL W DOP COLOR FLOW  2015         KIDNEY BIOPSY      st vincents    TONSILLECTOMY      TOTAL KNEE ARTHROPLASTY Bilateral     matthew       Social History:    Social History     Tobacco Use    Smoking status: Former Smoker     Packs/day: 0.50     Years: 9.00     Pack years: 4.50     Types: Cigarettes     Start date: 10/23/1948     Last attempt to quit: 10/23/1957     Years since quittin.7    Smokeless tobacco: Never Used   Substance Use Topics    Alcohol use: No     Alcohol/week: 0.0 oz     Frequency: Never                                Counseling given: Not Answered      Vital Signs (Current):   Vitals:    19 1501 19 2345 19 0457 19 0753   BP: (!) 127/59 112/67 116/68 (!) 142/76   Pulse: 66 71 65 72   Resp: 17 16 16 16   Temp: 36.9 °C (98.4 °F) 37.1 °C (98.8 °F) 36.7 °C (98.1 °F) 36.6 °C (97.9 °F)   TempSrc: Temporal Temporal Temporal Temporal   SpO2:  99%  93%   Weight:       Height:                                                  BP Readings from Last 3 Encounters:   06/30/19 (!) 142/76   06/24/19 126/80   01/14/19 110/70       NPO Status: Time of last liquid consumption: 2300                        Time of last solid consumption: 2300                        Date of last liquid consumption: 06/29/19                        Date of last solid food consumption: 06/29/19    BMI:   Wt Readings from Last 3 Encounters:   06/29/19 163 lb (73.9 kg)   06/24/19 165 lb (74.8 kg)   01/14/19 160 lb (72.6 kg)     Body mass index is 26.31 kg/m². CBC:   Lab Results   Component Value Date    WBC 9.1 06/29/2019    RBC 3.65 06/29/2019    HGB 11.9 06/29/2019    HCT 35.6 06/29/2019    MCV 97.5 06/29/2019    RDW 14.6 06/29/2019     06/29/2019       CMP:   Lab Results   Component Value Date     06/29/2019    K 4.1 06/29/2019     06/29/2019    CO2 27 06/29/2019    BUN 15 06/29/2019    CREATININE 1.1 06/29/2019    GFRAA 57 06/29/2019    LABGLOM 47 06/29/2019    GLUCOSE 94 06/29/2019    GLUCOSE 78 12/21/2010    PROT 6.3 06/29/2019    CALCIUM 9.0 06/29/2019    BILITOT 1.2 06/29/2019    ALKPHOS 60 06/29/2019    AST 22 06/29/2019    ALT 15 06/29/2019       POC Tests: No results for input(s): POCGLU, POCNA, POCK, POCCL, POCBUN, POCHEMO, POCHCT in the last 72 hours.     Coags:   Lab Results   Component Value Date    PROTIME 13.1 06/29/2019    PROTIME 31.6 06/15/2016    INR 1.1 06/29/2019    APTT 31.1 06/29/2019       HCG (If Applicable): No results found for: PREGTESTUR, PREGSERUM, HCG, HCGQUANT     ABGs: No results found for: PHART, PO2ART, JKQ8RTO, VXU4ZUI, BEART, V3XZAQHT     Type & Screen (If Applicable):  No results found for: LABABO, [address]    Anesthesia Evaluation  Patient summary reviewed and Nursing notes reviewed no history of anesthetic complications:   Airway: Mallampati: II  TM distance: >3 FB   Neck ROM: full  Mouth opening: > = 3 FB Dental:          Pulmonary: breath sounds clear to auscultation      (-) not a current smoker                          ROS comment: ILD (interstitial lung disease)  Pulmonary fibrosis      Granulomatosis with polyangiitis - gets monthly treatment;  Sees ENT in [place] annually; Appears to be well controlled     Cardiovascular:    (+) hypertension:, valvular problems/murmurs: MVP, murmur,       ECG reviewed  Rhythm: regular  Rate: normal           Beta Blocker:  Not on Beta Blocker      ROS comment: Very functionally active; Does not use any assistive devices     Neuro/Psych:               GI/Hepatic/Renal:   (+) GERD:, liver disease:,           Endo/Other:    (+) : arthritis: OA., malignancy/cancer (cervical cancer). Abdominal:       Abdomen: soft. Vascular:   + PVD, aortic or cerebral (Granulomatosis with polyangiitis ([address]), PE. Anesthesia Plan      general     ASA 3       Induction: intravenous. BIS  MIPS: Prophylactic antiemetics administered and Postoperative trial extubation. Anesthetic plan and risks discussed with patient. Plan discussed with CRNA. Attending anesthesiologist reviewed and agrees with Pre Eval content              Kelechi Quinones RN   6/30/2019      Agree with above assessment. Physical exam unchanged. Spoke to patient about anesthetic plan.   Patient understands and wishes to proceed.  (this addendum was done preop but unable to be filed electronically at that time)

## 2019-06-30 NOTE — PLAN OF CARE
Problem: Falls - Risk of:  Goal: Will remain free from falls  Description  Will remain free from falls  Outcome: Met This Shift     Problem: Falls - Risk of:  Goal: Absence of physical injury  Description  Absence of physical injury  Outcome: Met This Shift     Problem: Pain:  Goal: Pain level will decrease  Description  Pain level will decrease  Outcome: Met This Shift     Problem: Daily Care:  Goal: Daily care needs are met  Description  Daily care needs are met  Outcome: Met This Shift

## 2019-06-30 NOTE — ANESTHESIA POSTPROCEDURE EVALUATION
Department of Anesthesiology  Postprocedure Note    Patient: Eben Kelley  MRN: [id number]  YOB: 1932  Date of evaluation: 6/30/2019  Time:  5:42 PM     Procedure Summary     Date:  06/30/19 Room / Location:  [facility]    Anesthesia Start:  9380 Anesthesia Stop:  0135    Procedure:  ANKLE OPEN REDUCTION INTERNAL FIXATION (Right Ankle) Diagnosis:  (right ankle fracture)    Surgeon:  Paras Smith MD Responsible Provider:  Leticia Lewis MD    Anesthesia Type:  general ASA Status:  3          Anesthesia Type: general    Romy Phase I: Romy Score: 9    Romy Phase II:      Last vitals: Reviewed and per EMR flowsheets.        Anesthesia Post Evaluation    Patient location during evaluation: PACU  Patient participation: complete - patient participated  Level of consciousness: awake  Pain score: 2  Airway patency: patent  Nausea & Vomiting: no vomiting and no nausea  Complications: no  Cardiovascular status: hemodynamically stable  Respiratory status: acceptable  Hydration status: stable

## 2019-06-30 NOTE — H&P
Current Medications:   Current Facility-Administered Medications: lidocaine PF 1 % injection 20 mL, 20 mL, Intra-articular, See Admin Instructions  citalopram (CELEXA) tablet 20 mg, 20 mg, Oral, Daily  docusate sodium (COLACE) capsule 100 mg, 100 mg, Oral, BID  pantoprazole (PROTONIX) tablet 40 mg, 40 mg, Oral, Daily  predniSONE (DELTASONE) tablet 5 mg, 5 mg, Oral, Daily  lisinopril (PRINIVIL;ZESTRIL) tablet 10 mg, 10 mg, Oral, Daily  sodium chloride flush 0.9 % injection 10 mL, 10 mL, Intravenous, 2 times per day  sodium chloride flush 0.9 % injection 10 mL, 10 mL, Intravenous, PRN  magnesium hydroxide (MILK OF MAGNESIA) 400 MG/5ML suspension 30 mL, 30 mL, Oral, Daily PRN  ondansetron (ZOFRAN) injection 4 mg, 4 mg, Intravenous, Q6H PRN  enoxaparin (LOVENOX) injection 40 mg, 40 mg, Subcutaneous, Daily  acetaminophen (TYLENOL) tablet 650 mg, 650 mg, Oral, Q4H PRN  diphenhydrAMINE (BENADRYL) 12.5 MG/5ML elixir 25 mg, 25 mg, Oral, Q6H PRN  traMADol (ULTRAM) tablet 50 mg, 50 mg, Oral, Q6H PRN **OR** traMADol (ULTRAM) tablet 100 mg, 100 mg, Oral, Q6H PRN  Allergies:  Codeine and Penicillins    Social History:   TOBACCO:   reports that she quit smoking about 61 years ago. Her smoking use included cigarettes. She started smoking about 70 years ago. She has a 4.50 pack-year smoking history. She has never used smokeless tobacco.  ETOH:   reports that she does not drink alcohol. DRUGS:   reports that she does not use drugs.   ACTIVITIES OF DAILY LIVING:    OCCUPATION:    Family History:       Problem Relation Age of Onset    Cancer Mother     Stroke Father     Breast Cancer Sister     Other Brother         brain tumor    Breast Cancer Sister        REVIEW OF SYSTEMS:   Skin: no abnormal pigmentation, rash  Eyes: no blurring or eye pain   Ears/Nose/Throat: no hearing loss, tinnitus  Respiratory: No increased work of breathing, no coughing  Cardiovascular: Brisk capillary refill bilaterally, well perfused

## 2019-07-01 LAB — URINE CULTURE, ROUTINE: NORMAL

## 2019-07-01 PROCEDURE — 6360000002 HC RX W HCPCS: Performed by: STUDENT IN AN ORGANIZED HEALTH CARE EDUCATION/TRAINING PROGRAM

## 2019-07-01 PROCEDURE — 97530 THERAPEUTIC ACTIVITIES: CPT

## 2019-07-01 PROCEDURE — 97165 OT EVAL LOW COMPLEX 30 MIN: CPT

## 2019-07-01 PROCEDURE — 2580000003 HC RX 258: Performed by: STUDENT IN AN ORGANIZED HEALTH CARE EDUCATION/TRAINING PROGRAM

## 2019-07-01 PROCEDURE — 97161 PT EVAL LOW COMPLEX 20 MIN: CPT

## 2019-07-01 PROCEDURE — 6370000000 HC RX 637 (ALT 250 FOR IP): Performed by: STUDENT IN AN ORGANIZED HEALTH CARE EDUCATION/TRAINING PROGRAM

## 2019-07-01 PROCEDURE — 1200000000 HC SEMI PRIVATE

## 2019-07-01 PROCEDURE — 97535 SELF CARE MNGMENT TRAINING: CPT

## 2019-07-01 RX ORDER — TRAMADOL HYDROCHLORIDE 50 MG/1
50 TABLET ORAL EVERY 6 HOURS PRN
Qty: 20 TABLET | Refills: 0 | Status: SHIPPED | OUTPATIENT
Start: 2019-07-01 | End: 2019-07-08

## 2019-07-01 RX ORDER — ASPIRIN 81 MG/1
81 TABLET ORAL
Qty: 40 TABLET | Refills: 0 | Status: SHIPPED | OUTPATIENT
Start: 2019-07-01 | End: 2019-09-06 | Stop reason: CLARIF

## 2019-07-01 RX ORDER — MIDAZOLAM HYDROCHLORIDE 1 MG/ML
0.5 INJECTION INTRAMUSCULAR; INTRAVENOUS EVERY 4 HOURS
Status: DISPENSED | OUTPATIENT
Start: 2019-07-01 | End: 2019-07-02

## 2019-07-01 RX ADMIN — DOCUSATE SODIUM 100 MG: 100 CAPSULE, LIQUID FILLED ORAL at 08:11

## 2019-07-01 RX ADMIN — ASPIRIN 81 MG: 81 TABLET ORAL at 12:09

## 2019-07-01 RX ADMIN — ACETAMINOPHEN 650 MG: 325 TABLET, FILM COATED ORAL at 11:00

## 2019-07-01 RX ADMIN — Medication 10 ML: at 08:11

## 2019-07-01 RX ADMIN — DIPHENHYDRAMINE HYDROCHLORIDE 25 MG: 25 SOLUTION ORAL at 21:13

## 2019-07-01 RX ADMIN — Medication 10 ML: at 21:06

## 2019-07-01 RX ADMIN — ENOXAPARIN SODIUM 40 MG: 40 INJECTION SUBCUTANEOUS at 08:11

## 2019-07-01 RX ADMIN — TRAMADOL HYDROCHLORIDE 100 MG: 50 TABLET, FILM COATED ORAL at 03:17

## 2019-07-01 RX ADMIN — LISINOPRIL 10 MG: 10 TABLET ORAL at 08:11

## 2019-07-01 RX ADMIN — PREDNISONE 5 MG: 5 TABLET ORAL at 21:06

## 2019-07-01 RX ADMIN — PANTOPRAZOLE SODIUM 40 MG: 40 TABLET, DELAYED RELEASE ORAL at 08:11

## 2019-07-01 RX ADMIN — DEXTROSE MONOHYDRATE 2 G: 50 INJECTION, SOLUTION INTRAVENOUS at 03:14

## 2019-07-01 RX ADMIN — CITALOPRAM 20 MG: 20 TABLET, FILM COATED ORAL at 21:06

## 2019-07-01 RX ADMIN — MIDAZOLAM HYDROCHLORIDE 0.5 MG: 2 INJECTION, SOLUTION INTRAMUSCULAR; INTRAVENOUS at 22:26

## 2019-07-01 RX ADMIN — TRAMADOL HYDROCHLORIDE 100 MG: 50 TABLET, FILM COATED ORAL at 22:10

## 2019-07-01 RX ADMIN — DOCUSATE SODIUM 100 MG: 100 CAPSULE, LIQUID FILLED ORAL at 21:06

## 2019-07-01 ASSESSMENT — PAIN DESCRIPTION - PAIN TYPE
TYPE: SURGICAL PAIN

## 2019-07-01 ASSESSMENT — PAIN SCALES - GENERAL
PAINLEVEL_OUTOF10: 0
PAINLEVEL_OUTOF10: 4
PAINLEVEL_OUTOF10: 3
PAINLEVEL_OUTOF10: 7
PAINLEVEL_OUTOF10: 0
PAINLEVEL_OUTOF10: 7
PAINLEVEL_OUTOF10: 0
PAINLEVEL_OUTOF10: 7

## 2019-07-01 ASSESSMENT — PAIN - FUNCTIONAL ASSESSMENT
PAIN_FUNCTIONAL_ASSESSMENT: PREVENTS OR INTERFERES SOME ACTIVE ACTIVITIES AND ADLS
PAIN_FUNCTIONAL_ASSESSMENT: PREVENTS OR INTERFERES SOME ACTIVE ACTIVITIES AND ADLS

## 2019-07-01 ASSESSMENT — PAIN DESCRIPTION - ONSET
ONSET: ON-GOING
ONSET: ON-GOING

## 2019-07-01 ASSESSMENT — PAIN DESCRIPTION - LOCATION
LOCATION: ANKLE

## 2019-07-01 ASSESSMENT — PAIN DESCRIPTION - PROGRESSION
CLINICAL_PROGRESSION: NOT CHANGED
CLINICAL_PROGRESSION: NOT CHANGED

## 2019-07-01 ASSESSMENT — PAIN DESCRIPTION - DESCRIPTORS
DESCRIPTORS: ACHING;CONSTANT;DISCOMFORT

## 2019-07-01 ASSESSMENT — PAIN DESCRIPTION - FREQUENCY
FREQUENCY: CONTINUOUS
FREQUENCY: INTERMITTENT
FREQUENCY: INTERMITTENT

## 2019-07-01 ASSESSMENT — PAIN DESCRIPTION - ORIENTATION
ORIENTATION: RIGHT

## 2019-07-01 NOTE — OP NOTE
[address]                  [address] [facility] [address] [place] [address]                                OPERATIVE REPORT    PATIENT NAME: Mukesh Landeros                   :        1932  MED REC NO:   [id number]                            ROOM:       [facility]  ACCOUNT NO:   [de-identified]                           ADMIT DATE: 2019  PROVIDER:     Kusum Church MD    DATE OF PROCEDURE:  2019    PREOPERATIVE DIAGNOSIS:  Right bimalleolar ankle fracture. POSTOPERATIVE DIAGNOSIS:  Right bimalleolar ankle fracture. PROCEDURE:  Open reduction internal fixation of right bimalleolar ankle  fracture. SURGEON:  Kusum Church MD    ANESTHESIA:  General.    COMPLICATIONS:  None. TOURNIQUET TIME:  None. ESTIMATED BLOOD LOSS:  10 mL. HARDWARE:  Large Steinmann pin and a 3.5 x 85 mm Synthes pelvic screw. SPECIMENS:  None. DESCRIPTION OF PROCEDURE:  The patient was identified and brought to the  operating room. Tourniquet was placed around her proximal right leg,  was prepped and draped in a sterile fashion. A bump was placed under  her foot and ankle and topographical anatomy of the fibula in the AP and  lateral projection was marked out on the skin. Some local of  approximately 5 mL of 0.5% Marcaine was placed on the lateral aspect of  the calcaneus and a small stab incision was placed. We then ensured  that the fibula is well aligned and through this, a small Steinmann pin  was placed just medial to the tip of the fibula and advanced into the  medullary canal and confirmed in AP and lateral projection. We  sequentially dilated this hole with larger and larger Steinmann pins  until we got to a pin of almost 1/4 inch in diameter. We cut the pin  short, cut the other tip off of it, and then tapped it to see just below  going into the bone. We had held the fibula in excellent alignment  although was comminuted.   It was not perfectly anatomically

## 2019-07-01 NOTE — PLAN OF CARE
Problem: Falls - Risk of:  Goal: Will remain free from falls  Description  Will remain free from falls  7/1/2019 0945 by Beth Shin RN  Outcome: Met This Shift  7/1/2019 0021 by Sb Sanchez RN  Outcome: Met This Shift  Goal: Absence of physical injury  Description  Absence of physical injury  7/1/2019 0945 by Beth Shin RN  Outcome: Met This Shift  7/1/2019 0021 by Sb Sanchez RN  Outcome: Met This Shift     Problem: Pain:  Goal: Pain level will decrease  Description  Pain level will decrease  7/1/2019 0945 by Beth Shin RN  Outcome: Met This Shift  7/1/2019 0021 by Sb Sanchez RN  Outcome: Met This Shift  Goal: Control of acute pain  Description  Control of acute pain  Outcome: Met This Shift     Problem: Safety:  Goal: Free from accidental physical injury  Description  Free from accidental physical injury  Outcome: Met This Shift  Goal: Free from intentional harm  Description  Free from intentional harm  Outcome: Met This Shift     Problem: Daily Care:  Goal: Daily care needs are met  Description  Daily care needs are met  7/1/2019 0021 by Sb Sanchez RN  Outcome: Met This Shift

## 2019-07-02 VITALS
TEMPERATURE: 98.2 F | SYSTOLIC BLOOD PRESSURE: 130 MMHG | WEIGHT: 163 LBS | RESPIRATION RATE: 17 BRPM | HEART RATE: 71 BPM | DIASTOLIC BLOOD PRESSURE: 61 MMHG | BODY MASS INDEX: 26.2 KG/M2 | HEIGHT: 66 IN | OXYGEN SATURATION: 97 %

## 2019-07-02 PROCEDURE — 2580000003 HC RX 258: Performed by: STUDENT IN AN ORGANIZED HEALTH CARE EDUCATION/TRAINING PROGRAM

## 2019-07-02 PROCEDURE — 6360000002 HC RX W HCPCS: Performed by: STUDENT IN AN ORGANIZED HEALTH CARE EDUCATION/TRAINING PROGRAM

## 2019-07-02 PROCEDURE — 6370000000 HC RX 637 (ALT 250 FOR IP): Performed by: STUDENT IN AN ORGANIZED HEALTH CARE EDUCATION/TRAINING PROGRAM

## 2019-07-02 PROCEDURE — 6370000000 HC RX 637 (ALT 250 FOR IP): Performed by: INTERNAL MEDICINE

## 2019-07-02 RX ORDER — SODIUM PHOSPHATE, DIBASIC AND SODIUM PHOSPHATE, MONOBASIC 7; 19 G/133ML; G/133ML
1 ENEMA RECTAL
Status: COMPLETED | OUTPATIENT
Start: 2019-07-02 | End: 2019-07-02

## 2019-07-02 RX ADMIN — SODIUM PHOSPHATE, DIBASIC AND SODIUM PHOSPHATE, MONOBASIC 1 ENEMA: 7; 19 ENEMA RECTAL at 17:43

## 2019-07-02 RX ADMIN — Medication 10 ML: at 09:11

## 2019-07-02 RX ADMIN — ACETAMINOPHEN 650 MG: 325 TABLET, FILM COATED ORAL at 09:10

## 2019-07-02 RX ADMIN — PANTOPRAZOLE SODIUM 40 MG: 40 TABLET, DELAYED RELEASE ORAL at 09:11

## 2019-07-02 RX ADMIN — ENOXAPARIN SODIUM 40 MG: 40 INJECTION SUBCUTANEOUS at 09:10

## 2019-07-02 RX ADMIN — LISINOPRIL 10 MG: 10 TABLET ORAL at 09:10

## 2019-07-02 RX ADMIN — DOCUSATE SODIUM 100 MG: 100 CAPSULE, LIQUID FILLED ORAL at 09:10

## 2019-07-02 ASSESSMENT — PAIN DESCRIPTION - LOCATION: LOCATION: ANKLE

## 2019-07-02 ASSESSMENT — PAIN DESCRIPTION - FREQUENCY: FREQUENCY: CONTINUOUS

## 2019-07-02 ASSESSMENT — PAIN DESCRIPTION - DESCRIPTORS: DESCRIPTORS: ACHING;CONSTANT;DISCOMFORT

## 2019-07-02 ASSESSMENT — PAIN SCALES - GENERAL
PAINLEVEL_OUTOF10: 0
PAINLEVEL_OUTOF10: 0
PAINLEVEL_OUTOF10: 5
PAINLEVEL_OUTOF10: 9
PAINLEVEL_OUTOF10: 0

## 2019-07-02 ASSESSMENT — PAIN DESCRIPTION - PROGRESSION: CLINICAL_PROGRESSION: NOT CHANGED

## 2019-07-02 ASSESSMENT — PAIN DESCRIPTION - PAIN TYPE: TYPE: SURGICAL PAIN

## 2019-07-02 ASSESSMENT — PAIN DESCRIPTION - ORIENTATION: ORIENTATION: RIGHT

## 2019-07-02 ASSESSMENT — PAIN - FUNCTIONAL ASSESSMENT: PAIN_FUNCTIONAL_ASSESSMENT: PREVENTS OR INTERFERES SOME ACTIVE ACTIVITIES AND ADLS

## 2019-07-02 ASSESSMENT — PAIN DESCRIPTION - ONSET: ONSET: ON-GOING

## 2019-07-02 NOTE — CARE COORDINATION
MATHEUS Discharge planning:    Patient will discharge to [facility], lonny today at [address] [facility] transportation to provide the transport. MATHEUS notified patient, patients , nursing and Sindi from [facility]. Envelope and n17 completed. Patient will have a private room per [employer].  Ayush Orellana

## 2019-07-02 NOTE — PROGRESS NOTES
Admitted to PACU & placed on monitors. Family not currently answering in lobby but had called into PACU prior to speaking to Dr Ozzy Luna  After OR.
Department of Orthopedic Surgery  Resident Progress Note    Patient seen and examined. Pain controlled. No new complaints. Denies chest pain, shortness of breath, dizziness/lightheadedness. Patient is doing well today. She has decreased pain. Patient is anxious about her drainage from her ankle. VITALS:  /66   Pulse 76   Temp 98.6 °F (37 °C) (Temporal)   Resp 16   Ht 5' 6\" (1.676 m)   Wt 163 lb (73.9 kg)   SpO2 97%   BMI 26.31 kg/m²     General: alert and oriented to person, place and time, well-developed and well-nourished, in no acute distress    MUSCULOSKELETAL:   right lower extremity:  · Mild bloody staining to ace wrap. · Compartments soft and compressible  · +PF/DF/EHL  · +2/4 DP & PT pulses, Brisk Cap refill, Toes warm and perfused  · Distal sensation grossly intact to Peroneals, Sural, Saphenous, and tibial nrs    CBC:   Lab Results   Component Value Date    WBC 9.1 06/29/2019    HGB 11.9 06/29/2019    HCT 35.6 06/29/2019     06/29/2019     PT/INR:    Lab Results   Component Value Date    PROTIME 13.1 06/29/2019    PROTIME 31.6 06/15/2016    INR 1.1 06/29/2019       ASSESSMENT  · S/P ORIF of right bimalleolar ankle fracture    PLAN      · Patients splint removed and incisions inspected. Skin well approximated no erythema. Mild bloody drainage from wounds. New well padded u splint applied.    · Continue physical therapy and protocol: NWB - RLE  · 24 hour abx coverage- complete  · Deep venous thrombosis prophylaxis - 81 ASA MWF, early mobilization  · PT/OT  · Pain Control: tylenol  · Monitor H&H  · D/C Plan:  Planning  · [id number] for discharge from orthopedic standpoint  · Discussed with Dr. Stevan Jackson
Message left on  voicemail for pain medication order. Will await new orders.       New orders noted
Message left on Dr. Ema Alicea regarding codeine allergy and that pt is no longer taking the bactrim every other day.
Quiet kit provided to patient, due to her complaints of noise level. This was discussed with fellow colleagues. Will continue to monitor.
pain control., OT role and POC reviewed. Patient  demo good- understanding of functional limitations and safety and walker management. Understanding of need for rehab. Pt would benefit from continued skilled OT to increase functional independence and quality of life. Eval Complexity: low    Assessment of current deficits   Functional mobility [x]  ADLs [x] Strength []  Cognition []  Functional transfers  [x] IADLs [x] Safety Awareness [x]  Endurance [x]  Fine Motor Coordination [] Balance [x] Vision/perception [] Sensation []   Gross Motor Coordination [] ROM [] Delirium []                  Motor Control []    Plan of Care:   ADL retraining [x]   Equipment needs [x]   Neuromuscular re-education [] Energy Conservation Techniques [x]  Functional Transfer training [x] Patient and/or Family Education [x]  Functional Mobility training [x]  Environmental Modifications [x]  Cognitive re-training []   Compensatory techniques for ADLs [x]  Splinting Needs []   Positioning to improve overall function [x]   Therapeutic Activity [x]  Therapeutic Exercise  [x]  Visual/Perceptual: []    Delirium prevention/treatment  []   Other:  []    Rehab Potential: Good for established goals    Patient / Family Goal: rehab then home    Patient and/or family were instructed diagnosis, prognosis/goals and plan of care. yes Demonstrated good understanding. [] Malnutrition indicators have been identified and nursing has been notified to ensure a dietitian consult is ordered. OT Evaluation + 25  treatment minutes  Tx. Time in: 9:05  Tx. Time out: 9:30    Tamea Mater.  [address]

## 2019-07-18 NOTE — DISCHARGE SUMMARY
Physician Discharge Summary     Patient ID:  Kevin Modi  [id number]  80 y.o.  3/3/1932    Admit date: 6/29/2019    Discharge date and time: 7/2/2019  7:35 PM     Admission Diagnoses: Principal Problem:    Ankle fracture, bimalleolar, closed, left, initial encounter  Active Problems:    MVP (mitral valve prolapse)    GERD (gastroesophageal reflux disease)    Sjogren's disease (HCC)    ILD (interstitial lung disease) ([address]    Granulomatosis with polyangiitis ([address]    Cervical cancer ([address]    History of pulmonary embolus (PE)    Essential hypertension  Resolved Problems:    * No resolved hospital problems. *      Discharge Diagnoses: Principal Problem:    Ankle fracture, bimalleolar, closed, left, initial encounter  Active Problems:    MVP (mitral valve prolapse)    GERD (gastroesophageal reflux disease)    Sjogren's disease (HCC)    ILD (interstitial lung disease) ([address]    Granulomatosis with polyangiitis (HCC)    Cervical cancer (HCC)    History of pulmonary embolus (PE)    Essential hypertension  Resolved Problems:    * No resolved hospital problems. *      Condition at discharge : Stable    Consults: IP CONSULT TO ORTHOPEDIC SURGERY  IP CONSULT TO HOSPITALIST  IP CONSULT TO ORTHOPEDIC SURGERY  IP CONSULT TO ANESTHESIOLOGY    Procedures: Open reduction internal fixation of right ankle fracture    Hospital Course: Patient is a 77-year-old lady who presents with fall and ankle pain. She tripped at home and fell. She was noted to have ankle fracture. She was admitted for orthopedic evaluation treatment. She underwent open reduction internal fixation of right bimalleolar ankle fracture. She was then discharged to subacute rehab in stable condition. XR ANKLE RIGHT (MIN 3 VIEWS)   Final Result   Anatomic alignment of fracture fragments post-ORIF as noted. FLUORO FOR SURGICAL PROCEDURES   Final Result   The detail of finding and operative notes.             XR CHEST 1 VW   Final Result      Chronic a week for 28 days, Disp-40 tablet, R-0Print         CONTINUE these medications which have NOT CHANGED    Details   Cholecalciferol (VITAMIN D3) 39583 units CAPS Take 1 capsule by mouthHistorical Med      sulfamethoxazole-trimethoprim (BACTRIM DS;SEPTRA DS) 800-160 MG per tablet Take 1 tablet by mouth every other dayHistorical Med      diclofenac 1.5 % SOLN As direcetd, Disp-1 Bottle, R-3Normal      pantoprazole (PROTONIX) 40 MG tablet TAKE 1 TABLET BY MOUTH EVERY DAY, Disp-30 tablet, R-3Normal      trandolapril (MAVIK) 2 MG tablet Take 1 tablet by mouth daily, Disp-30 tablet, R-3Normal      citalopram (CELEXA) 20 MG tablet TAKE 1 TABLET BY MOUTH NIGHTLY, Disp-30 tablet, R-3Normal      predniSONE (DELTASONE) 5 MG tablet Take 1 tablet by mouth daily, Disp-90 tablet, R-3Normal      alendronate (FOSAMAX) 70 MG tablet Take 70 mg by mouth every 7 days      Multiple Vitamins-Minerals (MULTI VITAMIN/MINERALS PO) Take 1 tablet by mouth daily      docusate sodium (COLACE, DULCOLAX) 100 MG CAPS Take 100 mg by mouth 2 times daily              Activity: As tolerated    Diet: Cardiac    Follow-up with Violeta Lafleur MD  [address] [place] [address] [phone]    Schedule an appointment as soon as possible for a visit in 2 weeks  Follow up    [address]  [address] [facility] [address].   [place] [id number]  [address] [phone]    Schedule an appointment as soon as possible for a visit in 1 week  Follow up         Note that over 30 minutes was spent in preparing discharge papers, discussing discharge with patient, medication review, etc.    Signed:  Khoi Gibbs  7/17/2019  8:05 PM

## 2020-01-19 ENCOUNTER — HOSPITAL ENCOUNTER (EMERGENCY)
Age: 85
Discharge: HOME | End: 2020-01-19
Attending: EMERGENCY MEDICINE
Payer: MEDICARE

## 2020-01-19 VITALS
TEMPERATURE: 97.5 F | RESPIRATION RATE: 18 BRPM | WEIGHT: 160 LBS | DIASTOLIC BLOOD PRESSURE: 56 MMHG | HEIGHT: 66 IN | HEART RATE: 76 BPM | SYSTOLIC BLOOD PRESSURE: 119 MMHG | BODY MASS INDEX: 25.71 KG/M2 | OXYGEN SATURATION: 100 %

## 2020-01-19 LAB
ALBUMIN SERPL-MCNC: 4.3 G/DL (ref 3.5–5.2)
ALP BLD-CCNC: 106 U/L (ref 35–104)
ALT SERPL-CCNC: 98 U/L (ref 0–32)
ANION GAP SERPL CALCULATED.3IONS-SCNC: 10 MMOL/L (ref 7–16)
AST SERPL-CCNC: 164 U/L (ref 0–31)
BACTERIA: ABNORMAL /HPF
BASOPHILS ABSOLUTE: 0.04 E9/L (ref 0–0.2)
BASOPHILS RELATIVE PERCENT: 0.4 % (ref 0–2)
BILIRUB SERPL-MCNC: 1.6 MG/DL (ref 0–1.2)
BILIRUBIN URINE: NEGATIVE
BLOOD, URINE: NEGATIVE
BUN BLDV-MCNC: 16 MG/DL (ref 8–23)
CALCIUM SERPL-MCNC: 9.3 MG/DL (ref 8.6–10.2)
CHLORIDE BLD-SCNC: 102 MMOL/L (ref 98–107)
CLARITY: CLEAR
CO2: 28 MMOL/L (ref 22–29)
COLOR: YELLOW
CREAT SERPL-MCNC: 1.1 MG/DL (ref 0.5–1)
EOSINOPHILS ABSOLUTE: 0.11 E9/L (ref 0.05–0.5)
EOSINOPHILS RELATIVE PERCENT: 1 % (ref 0–6)
GFR AFRICAN AMERICAN: 57
GFR NON-AFRICAN AMERICAN: 47 ML/MIN/1.73
GLUCOSE BLD-MCNC: 126 MG/DL (ref 74–99)
GLUCOSE URINE: 100 MG/DL
HCT VFR BLD CALC: 41.1 % (ref 34–48)
HEMOGLOBIN: 13.5 G/DL (ref 11.5–15.5)
IMMATURE GRANULOCYTES #: 0.05 E9/L
IMMATURE GRANULOCYTES %: 0.5 % (ref 0–5)
KETONES, URINE: NEGATIVE MG/DL
LEUKOCYTE ESTERASE, URINE: ABNORMAL
LYMPHOCYTES ABSOLUTE: 1.59 E9/L (ref 1.5–4)
LYMPHOCYTES RELATIVE PERCENT: 14.6 % (ref 20–42)
MCH RBC QN AUTO: 31.8 PG (ref 26–35)
MCHC RBC AUTO-ENTMCNC: 32.8 % (ref 32–34.5)
MCV RBC AUTO: 96.9 FL (ref 80–99.9)
MONOCYTES ABSOLUTE: 0.69 E9/L (ref 0.1–0.95)
MONOCYTES RELATIVE PERCENT: 6.3 % (ref 2–12)
NEUTROPHILS ABSOLUTE: 8.41 E9/L (ref 1.8–7.3)
NEUTROPHILS RELATIVE PERCENT: 77.2 % (ref 43–80)
NITRITE, URINE: NEGATIVE
PDW BLD-RTO: 15.1 FL (ref 11.5–15)
PH UA: 5.5 (ref 5–9)
PLATELET # BLD: 165 E9/L (ref 130–450)
PMV BLD AUTO: 9.9 FL (ref 7–12)
POTASSIUM SERPL-SCNC: 3.5 MMOL/L (ref 3.5–5)
PROTEIN UA: NEGATIVE MG/DL
RBC # BLD: 4.24 E12/L (ref 3.5–5.5)
RBC UA: ABNORMAL /HPF (ref 0–2)
SODIUM BLD-SCNC: 140 MMOL/L (ref 132–146)
SPECIFIC GRAVITY UA: 1.02 (ref 1–1.03)
TOTAL PROTEIN: 6.5 G/DL (ref 6.4–8.3)
TROPONIN: <0.01 NG/ML (ref 0–0.03)
UROBILINOGEN, URINE: 0.2 E.U./DL
WBC # BLD: 10.9 E9/L (ref 4.5–11.5)
WBC UA: ABNORMAL /HPF (ref 0–5)

## 2020-01-19 PROCEDURE — 84484 ASSAY OF TROPONIN QUANT: CPT

## 2020-01-19 PROCEDURE — 80053 COMPREHEN METABOLIC PANEL: CPT

## 2020-01-19 PROCEDURE — 36415 COLL VENOUS BLD VENIPUNCTURE: CPT

## 2020-01-19 PROCEDURE — 81001 URINALYSIS AUTO W/SCOPE: CPT

## 2020-01-19 PROCEDURE — 6360000002 HC RX W HCPCS: Performed by: EMERGENCY MEDICINE

## 2020-01-19 PROCEDURE — 99284 EMERGENCY DEPT VISIT MOD MDM: CPT

## 2020-01-19 PROCEDURE — 96374 THER/PROPH/DIAG INJ IV PUSH: CPT

## 2020-01-19 PROCEDURE — 85025 COMPLETE CBC W/AUTO DIFF WBC: CPT

## 2020-01-19 PROCEDURE — 93005 ELECTROCARDIOGRAM TRACING: CPT | Performed by: EMERGENCY MEDICINE

## 2020-01-19 RX ORDER — ONDANSETRON 2 MG/ML
4 INJECTION INTRAMUSCULAR; INTRAVENOUS ONCE
Status: COMPLETED | OUTPATIENT
Start: 2020-01-19 | End: 2020-01-19

## 2020-01-19 RX ADMIN — ONDANSETRON 4 MG: 2 INJECTION INTRAMUSCULAR; INTRAVENOUS at 22:50

## 2020-01-19 ASSESSMENT — PAIN DESCRIPTION - DESCRIPTORS: DESCRIPTORS: DISCOMFORT

## 2020-01-19 ASSESSMENT — PAIN DESCRIPTION - ORIENTATION: ORIENTATION: MID

## 2020-01-19 ASSESSMENT — PAIN SCALES - GENERAL: PAINLEVEL_OUTOF10: 8

## 2020-01-19 ASSESSMENT — PAIN DESCRIPTION - LOCATION: LOCATION: ABDOMEN

## 2020-01-19 ASSESSMENT — PAIN DESCRIPTION - PAIN TYPE: TYPE: ACUTE PAIN

## 2020-01-20 LAB
EKG ATRIAL RATE: 53 BPM
EKG P AXIS: 55 DEGREES
EKG P-R INTERVAL: 190 MS
EKG Q-T INTERVAL: 434 MS
EKG QRS DURATION: 90 MS
EKG QTC CALCULATION (BAZETT): 407 MS
EKG R AXIS: -16 DEGREES
EKG T AXIS: 5 DEGREES
EKG VENTRICULAR RATE: 53 BPM

## 2020-01-20 PROCEDURE — 93010 ELECTROCARDIOGRAM REPORT: CPT | Performed by: INTERNAL MEDICINE

## 2020-01-20 NOTE — ED PROVIDER NOTES
Department of Emergency Medicine   ED  Provider Note  Admit Date/RoomTime: 1/19/2020  9:55 PM  ED Room: 10/10    History of Present Illness:  1/19/20, Time: 10:43 PM  Chief Complaint   Patient presents with    Nausea     c/o chest discomfort/ indigeston since yesterday. vomited like a gusher she said. denies cardiac hx        Mike Sams is a 80 y.o. female presenting to the ED for chest pain, beginning one day ago. The complaint has been persistent, moderate in severity, and worsened by nothing. Pt comes in to the ED c/o chest pain, acid reflux, nausea, and emesis. She denies cardiac Hx. She is a former smoker. She does note Hx of Wegener's granulomatosis. Pt denies HA, SOB, diaphoresis, palpitations, arm pain/numbness, jaw pain, leg pain/swelling, fever, chills, abdominal pain, diarrhea, hematemesis, and coffee ground emesis. Review of Systems:   Pertinent positives and negatives are stated within HPI, all other systems reviewed and are negative.    --------------------------------------------- PAST HISTORY ---------------------------------------------  Past Medical History:  has a past medical history of FABIO (acute kidney injury) ([facility] [address] Cervical cancer ([facility] [address] Chest pain, Chronic renal impairment, stage 2 (mild), Erosive osteoarthritis of multiple sites, Former smoker, stopped smoking in distant past, Gastritis, GERD (gastroesophageal reflux disease), Glucose intolerance (impaired glucose tolerance), HTN (hypertension), ILD (interstitial lung disease) ([address] Microscopic polyangiitis ([address] MVP (mitral valve prolapse), Near syncope, Orthostatic hypotension, Pseudogout of left wrist, Pulmonary embolus ([address] Pulmonary fibrosis ([address] Pulmonary nodule, Sjogren's disease ([facility] [address] Volume depletion, and Wegener's granulomatosis (granulomatosis with polyangiitis) ([facility] [address] Past Surgical History:  has a past surgical history that includes ECHO Compl W Dop Color Flow (6/4/2015); Kidney biopsy;  Total knee arthroplasty (Bilateral); Tonsillectomy; and Ankle fracture surgery (Right, 6/30/2019). Social History:  reports that she quit smoking about 62 years ago. Her smoking use included cigarettes. She started smoking about 71 years ago. She has a 4.50 pack-year smoking history. She has never used smokeless tobacco. She reports that she does not drink alcohol or use drugs. Family History: family history includes Breast Cancer in her sister and sister; Cancer in her mother; Other in her brother; Stroke in her father. . Unless otherwise noted, family history is non contributory. The patients home medications have been reviewed. Allergies: Codeine and Penicillins    ---------------------------------------------------PHYSICAL EXAM--------------------------------------    Constitutional/General: Alert and oriented x3, afebrile, in NAD. Head: Normocephalic and atraumatic. Eyes: PERRL, EOMI, sclera non icteric. Mouth: Oropharynx clear, handling secretions, no trismus. Neck: Supple, full ROM, no stridor, no meningeal signs. Respiratory: Lungs clear to auscultation bilaterally, no rales, rhonchi, or wheezes. Not in respiratory distress. Cardiovascular: Regular rate. Regular rhythm. 2+ distal pulses. Equal extremity pulses. Chest: No chest wall tenderness. GI: Abdomen Soft, non tender, non distended. No rebound, guarding, or rigidity. Musculoskeletal: Moves all extremities x 4. Warm and well perfused, no clubbing, cyanosis, or edema. Capillary refill <3 seconds. Integument: Skin warm and dry. No rashes. Neurologic: GCS 15, no focal deficits, symmetric strength 5/5 in the upper and lower extremities bilaterally. Psychiatric: Normal Affect. EKG: Interpreted by emergency department physician, Viktoria Worrell, DO  This EKG is signed and interpreted by me.     Rate: 53  Rhythm: Sinus  Interpretation: No ectopy, no acute ST changes, no STEMI  Comparison: None    -------------------------------------------------- RESULTS -------------------------------------------------  I have personally reviewed all laboratory and imaging results for this patient. Results are listed below.      LABS: (Lab results interpreted by me)  Results for orders placed or performed during the hospital encounter of 01/19/20   CBC auto differential   Result Value Ref Range    WBC 10.9 4.5 - 11.5 E9/L    RBC 4.24 3.50 - 5.50 E12/L    Hemoglobin 13.5 11.5 - 15.5 g/dL    Hematocrit 41.1 34.0 - 48.0 %    MCV 96.9 80.0 - 99.9 fL    MCH 31.8 26.0 - 35.0 pg    MCHC 32.8 32.0 - 34.5 %    RDW 15.1 (H) 11.5 - 15.0 fL    Platelets 040 967 - 861 E9/L    MPV 9.9 7.0 - 12.0 fL    Neutrophils % 77.2 43.0 - 80.0 %    Immature Granulocytes % 0.5 0.0 - 5.0 %    Lymphocytes % 14.6 (L) 20.0 - 42.0 %    Monocytes % 6.3 2.0 - 12.0 %    Eosinophils % 1.0 0.0 - 6.0 %    Basophils % 0.4 0.0 - 2.0 %    Neutrophils Absolute 8.41 (H) 1.80 - 7.30 E9/L    Immature Granulocytes # 0.05 E9/L    Lymphocytes Absolute 1.59 1.50 - 4.00 E9/L    Monocytes Absolute 0.69 0.10 - 0.95 E9/L    Eosinophils Absolute 0.11 0.05 - 0.50 E9/L    Basophils Absolute 0.04 0.00 - 0.20 E9/L   Comprehensive Metabolic Panel   Result Value Ref Range    Sodium 140 132 - 146 mmol/L    Potassium 3.5 3.5 - 5.0 mmol/L    Chloride 102 98 - 107 mmol/L    CO2 28 22 - 29 mmol/L    Anion Gap 10 7 - 16 mmol/L    Glucose 126 (H) 74 - 99 mg/dL    BUN 16 8 - 23 mg/dL    CREATININE 1.1 (H) 0.5 - 1.0 mg/dL    GFR Non-African American 47 >=60 mL/min/1.73    GFR African American 57     Calcium 9.3 8.6 - 10.2 mg/dL    Total Protein 6.5 6.4 - 8.3 g/dL    Alb 4.3 3.5 - 5.2 g/dL    Total Bilirubin 1.6 (H) 0.0 - 1.2 mg/dL    Alkaline Phosphatase 106 (H) 35 - 104 U/L    ALT 98 (H) 0 - 32 U/L     (H) 0 - 31 U/L   Troponin   Result Value Ref Range    Troponin <0.01 0.00 - 0.03 ng/mL   Urinalysis with Microscopic   Result Value Ref Range    Color, UA Yellow Straw/Yellow Clarity, UA Clear Clear    Glucose, Ur 100 (A) Negative mg/dL    Bilirubin Urine Negative Negative    Ketones, Urine Negative Negative mg/dL    Specific Gravity, UA 1.025 1.005 - 1.030    Blood, Urine Negative Negative    pH, UA 5.5 5.0 - 9.0    Protein, UA Negative Negative mg/dL    Urobilinogen, Urine 0.2 <2.0 E.U./dL    Nitrite, Urine Negative Negative    Leukocyte Esterase, Urine TRACE (A) Negative    WBC, UA 0-1 0 - 5 /HPF    RBC, UA NONE 0 - 2 /HPF    Bacteria, UA NONE /HPF       RADIOLOGY:  Interpreted by Radiologist unless otherwise specified. No orders to display       ------------------------- NURSING NOTES AND VITALS REVIEWED ---------------------------   The nursing notes within the ED encounter and vital signs as below have been reviewed by myself. BP (!) 119/56   Pulse 76   Temp 97.5 °F (36.4 °C) (Oral)   Resp 18   Ht 5' 6\" (1.676 m)   Wt 160 lb (72.6 kg)   SpO2 100%   BMI 25.82 kg/m²     Oxygen Saturation Interpretation: Abnormal    The cardiac monitor revealed NSR with a heart rate in the 70s as interpreted by me. The cardiac monitor was ordered secondary to the patient's heart rate and to monitor the patient for dysrhythmia. CPT 55000    The patients available past medical records and past encounters were reviewed. ------------------------------ ED COURSE/MEDICAL DECISION MAKING----------------------  Medications   ondansetron (ZOFRAN) injection 4 mg (4 mg Intravenous Given 1/19/20 2250)       Medical Decision Making:        Pt looks great for 87 and is quite chatty. Nothing is showing up but I explained to her and her  that she certainly can still have a component of CAD. She declines admit and promised to call cards in the am.      Re-Evaluations:          Each re-eval shows her to stable and talkative. 2330 Pt is feeling improved at this time.  I discussed treatment options including admission with her, she is declining admission at this time and states she wants to go home and follow up with Dr. Holli Bernal as an OP. Counseling: The emergency provider has spoken with the patient and  and discussed todays results, in addition to providing specific details for the plan of care and counseling regarding the diagnosis and prognosis. Questions are answered at this time and they are agreeable with the plan.    --------------------------------- IMPRESSION AND DISPOSITION ---------------------------------  IMPRESSION  1. Chest pain, unspecified type        DISPOSITION  Disposition: Discharge to home  Patient condition is stable    1/19/20, 10:43 PM.    This note is prepared by Surjit Tripathi, acting as Scribe for Tomasa Johnston DO. Tomasa Johnston DO:  The scribe's documentation has been prepared under my direction and personally reviewed by me in its entirety. I confirm that the note above accurately reflects all work, treatment, procedures, and medical decision making performed by me.              Tomasa Johnston DO  01/20/20 0131

## 2020-01-20 NOTE — ED NOTES
Instructions provided and questions answered. Iv disconnected, site wnl.        Yuli Castanon RN  01/19/20 3637

## 2020-02-13 ENCOUNTER — TELEPHONE (OUTPATIENT)
Dept: NON INVASIVE DIAGNOSTICS | Age: 85
End: 2020-02-13

## 2020-02-14 ENCOUNTER — HOSPITAL ENCOUNTER (OUTPATIENT)
Dept: NUCLEAR MEDICINE | Age: 85
Discharge: HOME | End: 2020-02-16
Payer: MEDICARE

## 2020-02-14 ENCOUNTER — HOSPITAL ENCOUNTER (OUTPATIENT)
Dept: NON INVASIVE DIAGNOSTICS | Age: 85
Discharge: HOME | End: 2020-02-14
Payer: MEDICARE

## 2020-02-14 LAB
LV EF: 70 %
LVEF MODALITY: NORMAL

## 2020-02-14 PROCEDURE — 3430000000 HC RX DIAGNOSTIC RADIOPHARMACEUTICAL: Performed by: RADIOLOGY

## 2020-02-14 PROCEDURE — A9500 TC99M SESTAMIBI: HCPCS | Performed by: RADIOLOGY

## 2020-02-14 PROCEDURE — 78452 HT MUSCLE IMAGE SPECT MULT: CPT

## 2020-02-14 PROCEDURE — 93017 CV STRESS TEST TRACING ONLY: CPT

## 2020-02-14 PROCEDURE — 6360000002 HC RX W HCPCS: Performed by: INTERNAL MEDICINE

## 2020-02-14 RX ORDER — SODIUM CHLORIDE 0.9 % (FLUSH) 0.9 %
10 SYRINGE (ML) INJECTION PRN
Status: DISCONTINUED | OUTPATIENT
Start: 2020-02-14 | End: 2020-02-17 | Stop reason: HOSPADM

## 2020-02-14 RX ADMIN — Medication 30 MILLICURIE: at 14:00

## 2020-02-14 RX ADMIN — Medication 10 MILLICURIE: at 12:13

## 2020-02-14 RX ADMIN — REGADENOSON 0.4 MG: 0.08 INJECTION, SOLUTION INTRAVENOUS at 13:55

## 2020-03-10 ENCOUNTER — HOSPITAL ENCOUNTER (OUTPATIENT)
Age: 85
Discharge: HOME | End: 2020-03-12
Payer: MEDICARE

## 2020-03-10 LAB
BASOPHILS ABSOLUTE: 0.07 E9/L (ref 0–0.2)
BASOPHILS RELATIVE PERCENT: 0.7 % (ref 0–2)
EOSINOPHILS ABSOLUTE: 0.69 E9/L (ref 0.05–0.5)
EOSINOPHILS RELATIVE PERCENT: 7.2 % (ref 0–6)
HCT VFR BLD CALC: 38.2 % (ref 34–48)
HEMOGLOBIN: 12.4 G/DL (ref 11.5–15.5)
IMMATURE GRANULOCYTES #: 0.04 E9/L
IMMATURE GRANULOCYTES %: 0.4 % (ref 0–5)
LYMPHOCYTES ABSOLUTE: 1.72 E9/L (ref 1.5–4)
LYMPHOCYTES RELATIVE PERCENT: 17.9 % (ref 20–42)
MCH RBC QN AUTO: 31.2 PG (ref 26–35)
MCHC RBC AUTO-ENTMCNC: 32.5 % (ref 32–34.5)
MCV RBC AUTO: 96 FL (ref 80–99.9)
MONOCYTES ABSOLUTE: 1.15 E9/L (ref 0.1–0.95)
MONOCYTES RELATIVE PERCENT: 11.9 % (ref 2–12)
NEUTROPHILS ABSOLUTE: 5.96 E9/L (ref 1.8–7.3)
NEUTROPHILS RELATIVE PERCENT: 61.9 % (ref 43–80)
PDW BLD-RTO: 14.2 FL (ref 11.5–15)
PLATELET # BLD: 206 E9/L (ref 130–450)
PMV BLD AUTO: 10.4 FL (ref 7–12)
RBC # BLD: 3.98 E12/L (ref 3.5–5.5)
WBC # BLD: 9.6 E9/L (ref 4.5–11.5)

## 2020-03-10 PROCEDURE — 86140 C-REACTIVE PROTEIN: CPT

## 2020-03-10 PROCEDURE — 80053 COMPREHEN METABOLIC PANEL: CPT

## 2020-03-10 PROCEDURE — 85651 RBC SED RATE NONAUTOMATED: CPT

## 2020-03-10 PROCEDURE — 85025 COMPLETE CBC W/AUTO DIFF WBC: CPT

## 2020-03-11 LAB
ALBUMIN SERPL-MCNC: 3.7 G/DL (ref 3.5–5.2)
ALP BLD-CCNC: 63 U/L (ref 35–104)
ALT SERPL-CCNC: 11 U/L (ref 0–32)
ANION GAP SERPL CALCULATED.3IONS-SCNC: 14 MMOL/L (ref 7–16)
AST SERPL-CCNC: 15 U/L (ref 0–31)
BILIRUB SERPL-MCNC: 0.7 MG/DL (ref 0–1.2)
BUN BLDV-MCNC: 17 MG/DL (ref 8–23)
C-REACTIVE PROTEIN: 2.7 MG/DL (ref 0–0.4)
CALCIUM SERPL-MCNC: 9.4 MG/DL (ref 8.6–10.2)
CHLORIDE BLD-SCNC: 99 MMOL/L (ref 98–107)
CO2: 24 MMOL/L (ref 22–29)
CREAT SERPL-MCNC: 0.9 MG/DL (ref 0.5–1)
GFR AFRICAN AMERICAN: >60
GFR NON-AFRICAN AMERICAN: 59 ML/MIN/1.73
GLUCOSE BLD-MCNC: 105 MG/DL (ref 74–99)
POTASSIUM SERPL-SCNC: 3.7 MMOL/L (ref 3.5–5)
SEDIMENTATION RATE, ERYTHROCYTE: 30 MM/HR (ref 0–20)
SODIUM BLD-SCNC: 137 MMOL/L (ref 132–146)
TOTAL PROTEIN: 6.5 G/DL (ref 6.4–8.3)

## 2020-05-23 ENCOUNTER — APPOINTMENT (OUTPATIENT)
Dept: GENERAL RADIOLOGY | Age: 85
End: 2020-05-23
Payer: MEDICARE

## 2020-05-23 ENCOUNTER — HOSPITAL ENCOUNTER (EMERGENCY)
Age: 85
Discharge: HOME | End: 2020-05-23
Attending: FAMILY MEDICINE
Payer: MEDICARE

## 2020-05-23 VITALS
RESPIRATION RATE: 16 BRPM | HEIGHT: 67 IN | DIASTOLIC BLOOD PRESSURE: 74 MMHG | BODY MASS INDEX: 24.33 KG/M2 | SYSTOLIC BLOOD PRESSURE: 144 MMHG | TEMPERATURE: 97.3 F | WEIGHT: 155 LBS | HEART RATE: 79 BPM | OXYGEN SATURATION: 96 %

## 2020-05-23 PROCEDURE — 6360000002 HC RX W HCPCS: Performed by: FAMILY MEDICINE

## 2020-05-23 PROCEDURE — 90715 TDAP VACCINE 7 YRS/> IM: CPT | Performed by: FAMILY MEDICINE

## 2020-05-23 PROCEDURE — 71101 X-RAY EXAM UNILAT RIBS/CHEST: CPT

## 2020-05-23 PROCEDURE — 90471 IMMUNIZATION ADMIN: CPT | Performed by: FAMILY MEDICINE

## 2020-05-23 PROCEDURE — 99283 EMERGENCY DEPT VISIT LOW MDM: CPT

## 2020-05-23 RX ORDER — DIAPER,BRIEF,INFANT-TODD,DISP
EACH MISCELLANEOUS ONCE
Status: DISCONTINUED | OUTPATIENT
Start: 2020-05-23 | End: 2020-05-23 | Stop reason: HOSPADM

## 2020-05-23 RX ADMIN — TETANUS TOXOID, REDUCED DIPHTHERIA TOXOID AND ACELLULAR PERTUSSIS VACCINE, ADSORBED 0.5 ML: 5; 2.5; 8; 8; 2.5 SUSPENSION INTRAMUSCULAR at 09:42

## 2020-05-23 ASSESSMENT — PAIN DESCRIPTION - FREQUENCY: FREQUENCY: INTERMITTENT

## 2020-05-23 ASSESSMENT — PAIN - FUNCTIONAL ASSESSMENT: PAIN_FUNCTIONAL_ASSESSMENT: PREVENTS OR INTERFERES SOME ACTIVE ACTIVITIES AND ADLS

## 2020-05-23 ASSESSMENT — PAIN DESCRIPTION - ONSET: ONSET: ON-GOING

## 2020-05-23 ASSESSMENT — PAIN SCALES - GENERAL: PAINLEVEL_OUTOF10: 3

## 2020-05-23 ASSESSMENT — PAIN DESCRIPTION - LOCATION: LOCATION: LEG

## 2020-05-23 ASSESSMENT — PAIN DESCRIPTION - ORIENTATION: ORIENTATION: RIGHT;LOWER;ANTERIOR

## 2020-05-23 ASSESSMENT — PAIN DESCRIPTION - DESCRIPTORS: DESCRIPTORS: TINGLING

## 2020-05-23 ASSESSMENT — PAIN DESCRIPTION - PAIN TYPE: TYPE: ACUTE PAIN

## 2020-07-14 ENCOUNTER — HOSPITAL ENCOUNTER (OUTPATIENT)
Age: 85
Discharge: HOME | End: 2020-07-16
Payer: MEDICARE

## 2020-07-14 PROBLEM — S82.842A ANKLE FRACTURE, BIMALLEOLAR, CLOSED, LEFT, INITIAL ENCOUNTER: Status: RESOLVED | Noted: 2019-06-29 | Resolved: 2020-07-14

## 2020-07-14 LAB
ALBUMIN SERPL-MCNC: 4.1 G/DL (ref 3.5–5.2)
ALP BLD-CCNC: 63 U/L (ref 35–104)
ALT SERPL-CCNC: 14 U/L (ref 0–32)
ANION GAP SERPL CALCULATED.3IONS-SCNC: 14 MMOL/L (ref 7–16)
AST SERPL-CCNC: 21 U/L (ref 0–31)
BASOPHILS ABSOLUTE: 0.04 E9/L (ref 0–0.2)
BASOPHILS RELATIVE PERCENT: 0.5 % (ref 0–2)
BILIRUB SERPL-MCNC: 0.9 MG/DL (ref 0–1.2)
BUN BLDV-MCNC: 17 MG/DL (ref 8–23)
C-REACTIVE PROTEIN: 0.1 MG/DL (ref 0–0.4)
CALCIUM SERPL-MCNC: 9.4 MG/DL (ref 8.6–10.2)
CHLORIDE BLD-SCNC: 105 MMOL/L (ref 98–107)
CO2: 24 MMOL/L (ref 22–29)
CREAT SERPL-MCNC: 1 MG/DL (ref 0.5–1)
EOSINOPHILS ABSOLUTE: 0.11 E9/L (ref 0.05–0.5)
EOSINOPHILS RELATIVE PERCENT: 1.4 % (ref 0–6)
GFR AFRICAN AMERICAN: >60
GFR NON-AFRICAN AMERICAN: 52 ML/MIN/1.73
GLUCOSE BLD-MCNC: 87 MG/DL (ref 74–99)
HCT VFR BLD CALC: 40.9 % (ref 34–48)
HEMOGLOBIN: 13.6 G/DL (ref 11.5–15.5)
IMMATURE GRANULOCYTES #: 0.02 E9/L
IMMATURE GRANULOCYTES %: 0.3 % (ref 0–5)
LYMPHOCYTES ABSOLUTE: 2.48 E9/L (ref 1.5–4)
LYMPHOCYTES RELATIVE PERCENT: 32 % (ref 20–42)
MCH RBC QN AUTO: 32.4 PG (ref 26–35)
MCHC RBC AUTO-ENTMCNC: 33.3 % (ref 32–34.5)
MCV RBC AUTO: 97.4 FL (ref 80–99.9)
MONOCYTES ABSOLUTE: 0.7 E9/L (ref 0.1–0.95)
MONOCYTES RELATIVE PERCENT: 9 % (ref 2–12)
NEUTROPHILS ABSOLUTE: 4.41 E9/L (ref 1.8–7.3)
NEUTROPHILS RELATIVE PERCENT: 56.8 % (ref 43–80)
PDW BLD-RTO: 14.7 FL (ref 11.5–15)
PLATELET # BLD: 153 E9/L (ref 130–450)
PMV BLD AUTO: 10.7 FL (ref 7–12)
POTASSIUM SERPL-SCNC: 4.3 MMOL/L (ref 3.5–5)
RBC # BLD: 4.2 E12/L (ref 3.5–5.5)
SEDIMENTATION RATE, ERYTHROCYTE: 4 MM/HR (ref 0–20)
SODIUM BLD-SCNC: 143 MMOL/L (ref 132–146)
TOTAL PROTEIN: 6.4 G/DL (ref 6.4–8.3)
WBC # BLD: 7.8 E9/L (ref 4.5–11.5)

## 2020-07-14 PROCEDURE — 85651 RBC SED RATE NONAUTOMATED: CPT

## 2020-07-14 PROCEDURE — 85025 COMPLETE CBC W/AUTO DIFF WBC: CPT

## 2020-07-14 PROCEDURE — 80053 COMPREHEN METABOLIC PANEL: CPT

## 2020-07-14 PROCEDURE — 86140 C-REACTIVE PROTEIN: CPT

## 2020-11-23 PROBLEM — N18.31 STAGE 3A CHRONIC KIDNEY DISEASE (HCC): Status: ACTIVE | Noted: 2020-11-23

## 2020-12-23 PROBLEM — F32.5 MAJOR DEPRESSIVE DISORDER WITH SINGLE EPISODE, IN FULL REMISSION (HCC): Status: ACTIVE | Noted: 2020-12-23

## 2022-04-12 PROBLEM — F32.4 MAJOR DEPRESSIVE DISORDER WITH SINGLE EPISODE, IN PARTIAL REMISSION (HCC): Status: ACTIVE | Noted: 2020-12-23

## 2022-05-18 ENCOUNTER — OFFICE VISIT (OUTPATIENT)
Dept: SURGERY | Age: 87
End: 2022-05-18
Payer: COMMERCIAL

## 2022-05-18 VITALS
DIASTOLIC BLOOD PRESSURE: 78 MMHG | WEIGHT: 162 LBS | HEIGHT: 65 IN | BODY MASS INDEX: 26.99 KG/M2 | RESPIRATION RATE: 16 BRPM | HEART RATE: 74 BPM | SYSTOLIC BLOOD PRESSURE: 104 MMHG

## 2022-05-18 DIAGNOSIS — R22.9 SKIN MASS: Primary | ICD-10-CM

## 2022-05-18 PROCEDURE — 21930 EXC BACK LES SC < 3 CM: CPT | Performed by: SURGERY

## 2022-05-18 PROCEDURE — 99203 OFFICE O/P NEW LOW 30 MIN: CPT | Performed by: SURGERY

## 2022-05-18 RX ORDER — LIDOCAINE HYDROCHLORIDE AND EPINEPHRINE 10; 10 MG/ML; UG/ML
20 INJECTION, SOLUTION INFILTRATION; PERINEURAL ONCE
Status: COMPLETED | OUTPATIENT
Start: 2022-05-18 | End: 2022-05-18

## 2022-05-18 RX ADMIN — LIDOCAINE HYDROCHLORIDE AND EPINEPHRINE 20 ML: 10; 10 INJECTION, SOLUTION INFILTRATION; PERINEURAL at 14:08

## 2022-05-18 NOTE — PROGRESS NOTES
[address] [facility]  General Surgery Attending Progress Note    Chief Complaint: left flank mass       Subjective:   80-year-old pleasant female who was referred here by her PCP for a left flank growth that has been present for at least 20 years. Patient denies any drainage. She is concerned about it and wants it removed. Patient came here today with her . Her  is in the waiting room. She wishes to have this thing removed. She is not on any blood thinners. She takes no antiplatelets. The growth is not painful. There is no pus. There is no tenderness. No fevers. No chills    I have reviewed and confirmed the past medical history, surgical history, social history, allergies in the chart. Medications: I have reviewed the medication list in the chart. Review of Systems - History obtained from the patient  General ROS: negative  Psychological ROS: negative  Ophthalmic ROS: negative  Allergy and Immunology ROS: negative  Hematological and Lymphatic ROS: negative  Endocrine ROS: negative  Breast ROS: negative  Respiratory ROS: negative  Cardiovascular ROS: negative  Gastrointestinal ROS: Negative  Genito-Urinary ROS: negative  Musculoskeletal ROS: Positive for skin growth      Objective:     Vitals:    05/18/22 1258   BP: 104/78   Pulse: 74   Resp: 16   No apparent distress, awake and alert x3  GCS 15  Appears younger than stated age  Left abdomen/flank 3 cm growth-no erythema, no fluctuance. Assessment:      Visit Diagnoses       Codes    Skin mass    -  Primary R22.9            Plan:   3 cm lower abdomen/ flankmass  i explained the plan to excise the area of the skin lesion. The specimen will be sent to pathology. I discussed the risk of  Bleeding, infection, and recurrence.  The patient acknowledges these risks and wishes to proceed with the excision at bedside    Afterwards, patient will follow-up in 2 weeks for a follow-up visit/suture removal           Ema Almonte MD, FACS  5/18/2022  1:09 PM      NOTE: This report was transcribed using voice recognition software. Every effort was made to ensure accuracy; however, inadvertent computerized transcription errors may be present.

## 2022-05-18 NOTE — PATIENT INSTRUCTIONS
Okay to start showering tomorrow 5/19  Pat dry after showering. Apply bacitracin ointment to wound and dress daily x4 days.   Follow-up in 2 weeks for suture removal

## 2022-05-18 NOTE — PROGRESS NOTES
Brief Postoperative Note    5/18/2022      Vandana Lemus  YOB: 1932  [id number]    Pre-operative Diagnosis: Skin lesion of left lower abdomen 3 cm          Post-operative Diagnosis: Same    Procedure: Surgical excision of 3 cm left lower abdominal wall mass    Anesthesia: 10 cc 1% lidocaine with epinephrine    Surgeons/Assistants: Arabella Girard M.D. Estimated Blood Loss: 2 mL    Complications: none    Specimens: were obtained      Details of Procedure: The area was prepped and draped in the standard sterile fashion. 10 cc 1% lidocaine with epinephrine was injected to provide local anesthesia. Using a #15 blade, an elliptical incision  3cm in length was used to excise the skin lesion. Lesion was located in the subcutaneous space . The specimen was removed and placed in a specimen container for surgical pathology. h emostasis was achieved. Afterwards the skin was closed using 4-0 nylon on a reverse cutting needle with vertical mattress sutures. Bacitracin was applied to the wound    There were no complications    Patient is allowed to shower starting 5/19. She is to pat dry the wound and apply bacitracin daily x4 days. Patient is to follow-up with me in 2 weeks for suture removal.    Arabella Girard MD, FACS  5/18/2022  1:50 PM        NOTE: This report was transcribed using voice recognition software. Every effort was made to ensure accuracy; however, inadvertent computerized transcription errors may be present.

## 2022-05-24 ENCOUNTER — HOSPITAL ENCOUNTER (EMERGENCY)
Age: 87
Discharge: HOME | End: 2022-05-24
Attending: STUDENT IN AN ORGANIZED HEALTH CARE EDUCATION/TRAINING PROGRAM
Payer: COMMERCIAL

## 2022-05-24 ENCOUNTER — APPOINTMENT (OUTPATIENT)
Dept: GENERAL RADIOLOGY | Age: 87
End: 2022-05-24
Payer: COMMERCIAL

## 2022-05-24 VITALS
TEMPERATURE: 98.8 F | WEIGHT: 160 LBS | RESPIRATION RATE: 18 BRPM | HEART RATE: 78 BPM | OXYGEN SATURATION: 95 % | BODY MASS INDEX: 26.66 KG/M2 | HEIGHT: 65 IN | SYSTOLIC BLOOD PRESSURE: 101 MMHG | DIASTOLIC BLOOD PRESSURE: 61 MMHG

## 2022-05-24 DIAGNOSIS — M25.471 RIGHT ANKLE SWELLING: Primary | ICD-10-CM

## 2022-05-24 PROCEDURE — 73630 X-RAY EXAM OF FOOT: CPT

## 2022-05-24 PROCEDURE — 99283 EMERGENCY DEPT VISIT LOW MDM: CPT

## 2022-05-24 PROCEDURE — 73610 X-RAY EXAM OF ANKLE: CPT

## 2022-05-24 ASSESSMENT — PAIN - FUNCTIONAL ASSESSMENT
PAIN_FUNCTIONAL_ASSESSMENT: NONE - DENIES PAIN
PAIN_FUNCTIONAL_ASSESSMENT: NONE - DENIES PAIN

## 2022-05-24 NOTE — ED PROVIDER NOTES
Department of Emergency Medicine   ED  Provider Note  Admit Date/RoomTime: 5/24/2022  3:04 PM  ED Room: 14/14          History of Present Illness:  5/24/22, Time: 3:15 PM EDT  Chief Complaint   Patient presents with    Fall     PT fell of swing c/o right ankle pain and swelling Happened last week. Issac Nevarez is a 80 y.o. female presenting to the ED for Right foot pain. The patient was walking a week ago. She twisted her ankle and fell to the ground. She did not sustain any other injuries besides to her right ankle. She had mild pain at the time but this is resolved. She has persistent ankle swelling. She has no pain at present. No fevers or chills. Nothing makes her symptoms better or worse. She has been wrapping as well as icing and elevating it without too much relief. She is not taking any medic Acacian for pain and she does not require it at this time. She has no numbness or paresthesias in her lower extremity. Review of Systems:  Review of systems obtained and negative unless stated otherwise above in the HPI.    --------------------------------------------- PAST HISTORY ---------------------------------------------  Past Medical History:  has a past medical history of FABIO (acute kidney injury) ([facility] [address] Cervical cancer ([facility] [address] Chest pain, Chronic renal impairment, stage 2 (mild), Erosive osteoarthritis of multiple sites, Former smoker, stopped smoking in distant past, Gastritis, GERD (gastroesophageal reflux disease), Glucose intolerance (impaired glucose tolerance), HTN (hypertension), ILD (interstitial lung disease) ([address] Microscopic polyangiitis ([address] MVP (mitral valve prolapse), Near syncope, Orthostatic hypotension, Pseudogout of left wrist, Pulmonary embolus ([address] Pulmonary fibrosis ([address] Pulmonary nodule, Sjogren's disease ([address] Volume depletion, and Wegener's granulomatosis (granulomatosis with polyangiitis).     Past Surgical History:  has a past surgical history that this patient. Results are listed below. LABS: (Lab results interpreted by me)  No results found for this visit on 05/24/22.,       RADIOLOGY:  Interpreted by Radiologist unless otherwise specified  XR ANKLE RIGHT (MIN 3 VIEWS)   Final Result   Substantially healed tib fib fractures with intact hardware. Pronounced   osteoarthritis at all 5 tarsometatarsal joints. Plantar heel spur. XR FOOT RIGHT (MIN 3 VIEWS)   Final Result   Substantially healed tib fib fractures with intact hardware. Pronounced   osteoarthritis at all 5 tarsometatarsal joints. Plantar heel spur. ------------------------- NURSING NOTES AND VITALS REVIEWED ---------------------------   The nursing notes within the ED encounter and vital signs as below have been reviewed by myself  /61   Pulse 78   Temp 98.8 °F (37.1 °C) (Temporal)   Resp 18   Ht 5' 5\" (1.651 m)   Wt 160 lb (72.6 kg)   SpO2 95%   BMI 26.63 kg/m²     Oxygen Saturation Interpretation: [Normal]    The patients available past medical records and past encounters were reviewed. ------------------------------ ED COURSE/MEDICAL DECISION MAKING----------------------  Medications - No data to display     Re-Evaluations: This patient's ED course included: [a personal history and physicial examination and re-evaluation prior to disposition]    This patient has remained hemodynamically stable during their ED course. Consultations:  None    Medical Decision Making:   Patient presents with ankle pain. Low suspicion for acute fracture or dislocation but did obtain an ankle x-ray due to the persistence of swelling. There is no clinical evidence of a septic joint at this time. The patient has been given an Ace wrap for compression. She has instructed on rest ice compression and elevation. She will weight-bear as tolerated. I suspect likely she just has an ankle effusion as persistent and needs more time to resolve.   X-rays negative otherwise. Counseling: The emergency provider has spoken with the patient and discussed todays results, in addition to providing specific details for the plan of care and counseling regarding the diagnosis and prognosis. Questions are answered at this time and they are agreeable with the plan.       --------------------------------- IMPRESSION AND DISPOSITION ---------------------------------    IMPRESSION  1. Right ankle swelling        DISPOSITION  Disposition: Discharge to home  Patient condition is stable    IDr. Efren, am the primary provider of record    Efren Ashley DO  Emergency Medicine    NOTE: This report was transcribed using voice recognition software.  Every effort was made to ensure accuracy; however, inadvertent computerized transcription errors may be present         Em Woodson DO  05/24/22 7853

## 2022-06-01 ENCOUNTER — OFFICE VISIT (OUTPATIENT)
Dept: SURGERY | Age: 87
End: 2022-06-01

## 2022-06-01 VITALS
DIASTOLIC BLOOD PRESSURE: 70 MMHG | SYSTOLIC BLOOD PRESSURE: 108 MMHG | BODY MASS INDEX: 26.66 KG/M2 | HEART RATE: 73 BPM | WEIGHT: 160 LBS | RESPIRATION RATE: 16 BRPM | HEIGHT: 65 IN

## 2022-06-01 DIAGNOSIS — Z09 POSTOP CHECK: Primary | ICD-10-CM

## 2022-06-01 PROCEDURE — 99024 POSTOP FOLLOW-UP VISIT: CPT | Performed by: SURGERY

## 2022-06-01 NOTE — PATIENT INSTRUCTIONS
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 This is benign!

## 2022-06-01 NOTE — PROGRESS NOTES
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      Follow up PRN    Electronically signed by Tara Lewis MD on 6/1/2022 at 2:10 PM

## 2022-07-16 ENCOUNTER — APPOINTMENT (OUTPATIENT)
Dept: GENERAL RADIOLOGY | Age: 87
End: 2022-07-16
Payer: MEDICARE

## 2022-07-16 ENCOUNTER — HOSPITAL ENCOUNTER (EMERGENCY)
Age: 87
Discharge: ANOTHER HOSPITAL | End: 2022-07-17
Attending: EMERGENCY MEDICINE
Payer: MEDICARE

## 2022-07-16 ENCOUNTER — APPOINTMENT (OUTPATIENT)
Dept: CT IMAGING | Age: 87
End: 2022-07-16
Payer: MEDICARE

## 2022-07-16 DIAGNOSIS — R47.01 APHASIA: Primary | ICD-10-CM

## 2022-07-16 DIAGNOSIS — G45.9 TIA (TRANSIENT ISCHEMIC ATTACK): ICD-10-CM

## 2022-07-16 LAB
ALBUMIN SERPL-MCNC: 3.7 G/DL (ref 3.5–5.2)
ALP BLD-CCNC: 74 U/L (ref 35–104)
ALT SERPL-CCNC: 14 U/L (ref 0–32)
ANION GAP SERPL CALCULATED.3IONS-SCNC: 9 MMOL/L (ref 7–16)
APTT: 27.2 SEC (ref 24.5–35.1)
AST SERPL-CCNC: 21 U/L (ref 0–31)
BASOPHILS ABSOLUTE: 0.08 E9/L (ref 0–0.2)
BASOPHILS RELATIVE PERCENT: 1 % (ref 0–2)
BILIRUB SERPL-MCNC: 0.9 MG/DL (ref 0–1.2)
BILIRUBIN DIRECT: 0.2 MG/DL (ref 0–0.3)
BILIRUBIN, INDIRECT: 0.7 MG/DL (ref 0–1)
BUN BLDV-MCNC: 18 MG/DL (ref 6–23)
CALCIUM SERPL-MCNC: 8.9 MG/DL (ref 8.6–10.2)
CHLORIDE BLD-SCNC: 102 MMOL/L (ref 98–107)
CO2: 25 MMOL/L (ref 22–29)
CREAT SERPL-MCNC: 1.1 MG/DL (ref 0.5–1)
EOSINOPHILS ABSOLUTE: 0.09 E9/L (ref 0.05–0.5)
EOSINOPHILS RELATIVE PERCENT: 1.1 % (ref 0–6)
GFR AFRICAN AMERICAN: 56
GFR NON-AFRICAN AMERICAN: 47 ML/MIN/1.73
GLUCOSE BLD-MCNC: 100 MG/DL (ref 74–99)
HCT VFR BLD CALC: 35.9 % (ref 34–48)
HEMOGLOBIN: 12 G/DL (ref 11.5–15.5)
IMMATURE GRANULOCYTES #: 0.04 E9/L
IMMATURE GRANULOCYTES %: 0.5 % (ref 0–5)
INR BLD: 1
LYMPHOCYTES ABSOLUTE: 2.03 E9/L (ref 1.5–4)
LYMPHOCYTES RELATIVE PERCENT: 25.2 % (ref 20–42)
MCH RBC QN AUTO: 32.7 PG (ref 26–35)
MCHC RBC AUTO-ENTMCNC: 33.4 % (ref 32–34.5)
MCV RBC AUTO: 97.8 FL (ref 80–99.9)
MONOCYTES ABSOLUTE: 0.75 E9/L (ref 0.1–0.95)
MONOCYTES RELATIVE PERCENT: 9.3 % (ref 2–12)
NEUTROPHILS ABSOLUTE: 5.06 E9/L (ref 1.8–7.3)
NEUTROPHILS RELATIVE PERCENT: 62.9 % (ref 43–80)
PDW BLD-RTO: 16.1 FL (ref 11.5–15)
PLATELET # BLD: 145 E9/L (ref 130–450)
PMV BLD AUTO: 10.3 FL (ref 7–12)
POTASSIUM REFLEX MAGNESIUM: 4 MMOL/L (ref 3.5–5)
PRO-BNP: 1336 PG/ML (ref 0–450)
PROTHROMBIN TIME: 11.2 SEC (ref 9.3–12.4)
RBC # BLD: 3.67 E12/L (ref 3.5–5.5)
SODIUM BLD-SCNC: 136 MMOL/L (ref 132–146)
TOTAL PROTEIN: 6.3 G/DL (ref 6.4–8.3)
TROPONIN, HIGH SENSITIVITY: 18 NG/L (ref 0–9)
TROPONIN, HIGH SENSITIVITY: 18 NG/L (ref 0–9)
WBC # BLD: 8.1 E9/L (ref 4.5–11.5)

## 2022-07-16 PROCEDURE — 83880 ASSAY OF NATRIURETIC PEPTIDE: CPT

## 2022-07-16 PROCEDURE — 99285 EMERGENCY DEPT VISIT HI MDM: CPT

## 2022-07-16 PROCEDURE — 71045 X-RAY EXAM CHEST 1 VIEW: CPT

## 2022-07-16 PROCEDURE — 70450 CT HEAD/BRAIN W/O DYE: CPT

## 2022-07-16 PROCEDURE — 93005 ELECTROCARDIOGRAM TRACING: CPT | Performed by: STUDENT IN AN ORGANIZED HEALTH CARE EDUCATION/TRAINING PROGRAM

## 2022-07-16 PROCEDURE — 85025 COMPLETE CBC W/AUTO DIFF WBC: CPT

## 2022-07-16 PROCEDURE — 36415 COLL VENOUS BLD VENIPUNCTURE: CPT

## 2022-07-16 PROCEDURE — 72125 CT NECK SPINE W/O DYE: CPT

## 2022-07-16 PROCEDURE — 6370000000 HC RX 637 (ALT 250 FOR IP): Performed by: STUDENT IN AN ORGANIZED HEALTH CARE EDUCATION/TRAINING PROGRAM

## 2022-07-16 PROCEDURE — 85610 PROTHROMBIN TIME: CPT

## 2022-07-16 PROCEDURE — 80076 HEPATIC FUNCTION PANEL: CPT

## 2022-07-16 PROCEDURE — 80048 BASIC METABOLIC PNL TOTAL CA: CPT

## 2022-07-16 PROCEDURE — 85730 THROMBOPLASTIN TIME PARTIAL: CPT

## 2022-07-16 PROCEDURE — 84484 ASSAY OF TROPONIN QUANT: CPT

## 2022-07-16 RX ORDER — ASPIRIN 325 MG
325 TABLET ORAL ONCE
Status: COMPLETED | OUTPATIENT
Start: 2022-07-16 | End: 2022-07-16

## 2022-07-16 RX ORDER — CLOPIDOGREL BISULFATE 75 MG/1
300 TABLET ORAL ONCE
Status: COMPLETED | OUTPATIENT
Start: 2022-07-16 | End: 2022-07-16

## 2022-07-16 RX ADMIN — CLOPIDOGREL BISULFATE 300 MG: 75 TABLET ORAL at 19:06

## 2022-07-16 RX ADMIN — ASPIRIN 325 MG: 325 TABLET ORAL at 19:06

## 2022-07-16 ASSESSMENT — ENCOUNTER SYMPTOMS
EYE DISCHARGE: 0
WHEEZING: 0
SHORTNESS OF BREATH: 0
ABDOMINAL PAIN: 0
COUGH: 0
VOMITING: 0
SINUS PRESSURE: 0
BACK PAIN: 0
EYE PAIN: 0
SORE THROAT: 0
DIARRHEA: 0
NAUSEA: 0
EYE REDNESS: 0

## 2022-07-16 NOTE — PROGRESS NOTES
@7086 access center notified of transfer to [facility]/ med surg tele  TIA/ Aphasia  LKW 10pm last night   @2908 Dr. Dina Nazario spoke with Dr. April North / accepting physician

## 2022-07-16 NOTE — ED PROVIDER NOTES
Patient is a 80-year-old female presented emergency department for aphasia and altered mental status. Apparently had some slurred speech last night at 10:00 last night, woke up this morning was little confused as well, apparently has fallen twice in the last couple days, currently she is asymptomatic and has no complaints. Symptoms moderate severity, were sudden onset, seem to be intermittent, currently resolved, no associated symptoms, associate with a recent fall, patient not any thinners, symptoms were sudden in onset. Review of Systems   Constitutional:  Negative for chills and fever. HENT:  Negative for ear pain, sinus pressure and sore throat. Eyes:  Negative for pain, discharge and redness. Respiratory:  Negative for cough, shortness of breath and wheezing. Cardiovascular:  Negative for chest pain. Gastrointestinal:  Negative for abdominal pain, diarrhea, nausea and vomiting. Genitourinary:  Negative for dysuria and frequency. Musculoskeletal:  Negative for arthralgias and back pain. Skin:  Negative for rash and wound. Neurological:  Positive for speech difficulty (resolved). Negative for weakness and headaches. Hematological:  Negative for adenopathy. All other systems reviewed and are negative. Physical Exam  Vitals and nursing note reviewed. Constitutional:       General: She is not in acute distress. Appearance: Normal appearance. She is not ill-appearing or toxic-appearing. HENT:      Head: Normocephalic and atraumatic. Right Ear: External ear normal.      Left Ear: External ear normal.      Nose: Nose normal.      Mouth/Throat:      Mouth: Mucous membranes are moist.   Eyes:      Extraocular Movements: Extraocular movements intact. Pupils: Pupils are equal, round, and reactive to light. Cardiovascular:      Rate and Rhythm: Normal rate and regular rhythm. Pulses: Normal pulses. Heart sounds: Normal heart sounds.    Pulmonary: Effort: Pulmonary effort is normal.      Breath sounds: Normal breath sounds. Abdominal:      General: Abdomen is flat. Bowel sounds are normal. There is no distension. Palpations: Abdomen is soft. There is no mass. Tenderness: There is no abdominal tenderness. Musculoskeletal:         General: Normal range of motion. Cervical back: Normal range of motion and neck supple. Skin:     General: Skin is warm and dry. Neurological:      General: No focal deficit present. Mental Status: She is alert and oriented to person, place, and time. Cranial Nerves: No cranial nerve deficit. Sensory: No sensory deficit. Motor: No weakness. Coordination: Coordination normal.      Comments: NIH 0        Procedures     MDM     Amount and/or Complexity of Data Reviewed  Clinical lab tests: reviewed  Tests in the radiology section of CPT®: reviewed  Tests in the medicine section of CPT®: reviewed         ED Course as of 07/16/22 1906   Sat Jul 16, 2022   1653 CT cervical spine and CT head negative [JG]   1654 EKG: This EKG is signed by emergency department physician. Rate: 65  Rhythm: Sinus  Interpretation: non-specific EKG  Comparison: stable as compared to patient's most recent EKG      [JG]   1832 Patient agreeable to transfer for TIA, will load with aspirin and Plavix [JG]   1859 Dr. Madison Moscoso accepted the patient for transfer [JG]   [age]year-old female presented emerged department for aphasia. Resolved by time she arrived in the emergency department, symptoms seem consistent with a TIA. She was neurologically intact, head CT was negative, CBC and BMP within her baseline. EKG unremarkable. Had fallen recently as well, no signs of bleeding or cervical injury. Vitals are stable, she was loaded with aspirin and Plavix.   Accepted for transfer to [facility]. [JG]      ED Course User Index  [JG] Juan Pinedo MD      20-year-old female presented emerged department for aphasia. Resolved by time she arrived in the emergency department, symptoms seem consistent with a TIA. She was neurologically intact, head CT was negative, CBC and BMP within her baseline. EKG unremarkable. Had fallen recently as well, no signs of bleeding or cervical injury. Vitals are stable, she was loaded with aspirin and Plavix. Accepted for transfer to [facility].    ED Course as of 07/16/22 1906   Sat Jul 16, 2022   1653 CT cervical spine and CT head negative [JG]   1654 EKG: This EKG is signed by emergency department physician. Rate: 65  Rhythm: Sinus  Interpretation: non-specific EKG  Comparison: stable as compared to patient's most recent EKG      [JG]   1832 Patient agreeable to transfer for TIA, will load with aspirin and Plavix [JG]   1859 Dr. Mina Pina accepted the patient for transfer [JG]   [age]year-old female presented emerged department for aphasia. Resolved by time she arrived in the emergency department, symptoms seem consistent with a TIA. She was neurologically intact, head CT was negative, CBC and BMP within her baseline. EKG unremarkable. Had fallen recently as well, no signs of bleeding or cervical injury. Vitals are stable, she was loaded with aspirin and Plavix.   Accepted for transfer to [facility]. [JG]      ED Course User Index  [JG] Vernon Tovra MD       --------------------------------------------- PAST HISTORY ---------------------------------------------  Past Medical History:  has a past medical history of FABIO (acute kidney injury) [facility]), Cervical cancer [facility]), Chest pain, Chronic renal impairment, stage 2 (mild), Erosive osteoarthritis of multiple sites, Former smoker, stopped smoking in distant past, Gastritis, GERD (gastroesophageal reflux disease), Glucose intolerance (impaired glucose tolerance), HTN (hypertension), ILD (interstitial lung disease) ([facility] Microscopic polyangiitis ([address] MVP (mitral valve prolapse), Near syncope, Orthostatic hypotension, Pseudogout of left wrist, Pulmonary embolus (HCC), Pulmonary fibrosis ([address] Pulmonary nodule, Sjogren's disease ([facility] Volume depletion, and Wegener's granulomatosis (granulomatosis with polyangiitis). Past Surgical History:  has a past surgical history that includes ECHO Compl W Dop Color Flow (6/4/2015); Kidney biopsy; Total knee arthroplasty (Bilateral); Tonsillectomy; and Ankle fracture surgery (Right, 6/30/2019). Social History:  reports that she quit smoking about 64 years ago. Her smoking use included cigarettes. She started smoking about 73 years ago. She has a 4.50 pack-year smoking history. She has never used smokeless tobacco. She reports that she does not drink alcohol and does not use drugs. Family History: family history includes Breast Cancer in her sister and sister; Cancer in her mother; Other in her brother; Stroke in her father. The patients home medications have been reviewed.     Allergies: Codeine and Penicillins    -------------------------------------------------- RESULTS -------------------------------------------------    Lab  Results for orders placed or performed during the hospital encounter of 05/99/02   Basic Metabolic Panel w/ Reflex to MG   Result Value Ref Range    Sodium 136 132 - 146 mmol/L    Potassium reflex Magnesium 4.0 3.5 - 5.0 mmol/L    Chloride 102 98 - 107 mmol/L    CO2 25 22 - 29 mmol/L    Anion Gap 9 7 - 16 mmol/L    Glucose 100 (H) 74 - 99 mg/dL    BUN 18 6 - 23 mg/dL    CREATININE 1.1 (H) 0.5 - 1.0 mg/dL    GFR Non-African American 47 >=60 mL/min/1.73    GFR African American 56     Calcium 8.9 8.6 - 10.2 mg/dL   CBC with Auto Differential   Result Value Ref Range    WBC 8.1 4.5 - 11.5 E9/L    RBC 3.67 3.50 - 5.50 E12/L    Hemoglobin 12.0 11.5 - 15.5 g/dL    Hematocrit 35.9 34.0 - 48.0 %    MCV 97.8 80.0 - 99.9 fL    MCH 32.7 26.0 - 35.0 pg    MCHC 33.4 32.0 - 34.5 %    RDW 16.1 (H) 11.5 - 15.0 fL Platelets 332 604 - 111 E9/L    MPV 10.3 7.0 - 12.0 fL    Neutrophils % 62.9 43.0 - 80.0 %    Immature Granulocytes % 0.5 0.0 - 5.0 %    Lymphocytes % 25.2 20.0 - 42.0 %    Monocytes % 9.3 2.0 - 12.0 %    Eosinophils % 1.1 0.0 - 6.0 %    Basophils % 1.0 0.0 - 2.0 %    Neutrophils Absolute 5.06 1.80 - 7.30 E9/L    Immature Granulocytes # 0.04 E9/L    Lymphocytes Absolute 2.03 1.50 - 4.00 E9/L    Monocytes Absolute 0.75 0.10 - 0.95 E9/L    Eosinophils Absolute 0.09 0.05 - 0.50 E9/L    Basophils Absolute 0.08 0.00 - 0.20 E9/L   Hepatic Function Panel   Result Value Ref Range    Total Protein 6.3 (L) 6.4 - 8.3 g/dL    Albumin 3.7 3.5 - 5.2 g/dL    Alkaline Phosphatase 74 35 - 104 U/L    ALT 14 0 - 32 U/L    AST 21 0 - 31 U/L    Total Bilirubin 0.9 0.0 - 1.2 mg/dL    Bilirubin, Direct 0.2 0.0 - 0.3 mg/dL    Bilirubin, Indirect 0.7 0.0 - 1.0 mg/dL   Troponin   Result Value Ref Range    Troponin, High Sensitivity 18 (H) 0 - 9 ng/L   Brain Natriuretic Peptide   Result Value Ref Range    Pro-BNP 1,336 (H) 0 - 450 pg/mL   Protime-INR   Result Value Ref Range    Protime 11.2 9.3 - 12.4 sec    INR 1.0    APTT   Result Value Ref Range    aPTT 27.2 24.5 - 35.1 sec   Troponin   Result Value Ref Range    Troponin, High Sensitivity 18 (H) 0 - 9 ng/L   EKG 12 Lead   Result Value Ref Range    Ventricular Rate 65 BPM    Atrial Rate 66 BPM    QRS Duration 80 ms    Q-T Interval 396 ms    QTc Calculation (Bazett) 411 ms    R Axis -8 degrees    T Axis 38 degrees       Radiology  CT Head WO Contrast   Final Result   No acute intracranial hemorrhage or edema. CT Cervical Spine WO Contrast   Final Result   No acute abnormality of the cervical spine. XR CHEST PORTABLE   Final Result   1. Chronic irregular opacities bilaterally. 2. No airspace opacity or pleural effusion.          MRI BRAIN WO CONTRAST    (Results Pending)       ------------------------- NURSING NOTES AND VITALS REVIEWED ---------------------------  Date / Time Roomed:  7/16/2022  3:04 PM  ED Bed Assignment:  25/25    The nursing notes within the ED encounter and vital signs as below have been reviewed. Patient Vitals for the past 24 hrs:   BP Temp Pulse Resp SpO2 Height Weight   07/16/22 1507 118/60 98.2 °F (36.8 °C) 73 16 99 % 5' 5\" (1.651 m) 160 lb (72.6 kg)       Oxygen Saturation Interpretation: Normal      ------------------------------------------ PROGRESS NOTES ------------------------------------------      I have spoken with the patient and discussed todays results, in addition to providing specific details for the plan of care and counseling regarding the diagnosis and prognosis. Their questions are answered at this time and they are agreeable with the plan. I have discussed the risks and benefits of transfer and they wish to proceed with the transfer. --------------------------------- ADDITIONAL PROVIDER NOTES ---------------------------------  Consultations:  Spoke with Dr. Griselda Kelly (Medicine). Discussed case. They will admit this patient. Reason for transfer: TIA, neuro eval.    This patient's ED course included: a personal history and physicial examination, re-evaluation prior to disposition, multiple bedside re-evaluations, cardiac monitoring, and continuous pulse oximetry    This patient has remained hemodynamically stable and been closely monitored during their ED course. Clinical Impression  1. Aphasia    2. TIA (transient ischemic attack)          Disposition  Patient's disposition: [facility]. Transferred by: ALS. Patient's condition is stable.          Jeannine Sharma MD  Resident  07/16/22 1701

## 2022-07-17 ENCOUNTER — HOSPITAL ENCOUNTER (OUTPATIENT)
Age: 87
Setting detail: OBSERVATION
Discharge: HOME | End: 2022-07-20
Attending: INTERNAL MEDICINE | Admitting: INTERNAL MEDICINE
Payer: MEDICARE

## 2022-07-17 ENCOUNTER — APPOINTMENT (OUTPATIENT)
Dept: MRI IMAGING | Age: 87
End: 2022-07-17
Payer: MEDICARE

## 2022-07-17 VITALS
HEART RATE: 60 BPM | BODY MASS INDEX: 26.66 KG/M2 | SYSTOLIC BLOOD PRESSURE: 133 MMHG | RESPIRATION RATE: 16 BRPM | WEIGHT: 160 LBS | DIASTOLIC BLOOD PRESSURE: 74 MMHG | OXYGEN SATURATION: 96 % | TEMPERATURE: 97.9 F | HEIGHT: 65 IN

## 2022-07-17 DIAGNOSIS — G45.9 TIA (TRANSIENT ISCHEMIC ATTACK): Primary | ICD-10-CM

## 2022-07-17 LAB
ANION GAP SERPL CALCULATED.3IONS-SCNC: 9 MMOL/L (ref 7–16)
BASOPHILS ABSOLUTE: 0.04 E9/L (ref 0–0.2)
BASOPHILS RELATIVE PERCENT: 0.5 % (ref 0–2)
BUN BLDV-MCNC: 16 MG/DL (ref 6–23)
CALCIUM SERPL-MCNC: 8.6 MG/DL (ref 8.6–10.2)
CHLORIDE BLD-SCNC: 106 MMOL/L (ref 98–107)
CHP ED QC CHECK: YES
CO2: 25 MMOL/L (ref 22–29)
CREAT SERPL-MCNC: 1 MG/DL (ref 0.5–1)
EKG ATRIAL RATE: 66 BPM
EKG Q-T INTERVAL: 396 MS
EKG QRS DURATION: 80 MS
EKG QTC CALCULATION (BAZETT): 411 MS
EKG R AXIS: -8 DEGREES
EKG T AXIS: 38 DEGREES
EKG VENTRICULAR RATE: 65 BPM
EOSINOPHILS ABSOLUTE: 0.19 E9/L (ref 0.05–0.5)
EOSINOPHILS RELATIVE PERCENT: 2.6 % (ref 0–6)
GFR AFRICAN AMERICAN: >60
GFR NON-AFRICAN AMERICAN: 52 ML/MIN/1.73
GLUCOSE BLD-MCNC: 84 MG/DL
GLUCOSE BLD-MCNC: 96 MG/DL (ref 74–99)
HCT VFR BLD CALC: 34.1 % (ref 34–48)
HEMOGLOBIN: 11.5 G/DL (ref 11.5–15.5)
IMMATURE GRANULOCYTES #: 0.01 E9/L
IMMATURE GRANULOCYTES %: 0.1 % (ref 0–5)
LYMPHOCYTES ABSOLUTE: 3.06 E9/L (ref 1.5–4)
LYMPHOCYTES RELATIVE PERCENT: 41.6 % (ref 20–42)
MCH RBC QN AUTO: 32.8 PG (ref 26–35)
MCHC RBC AUTO-ENTMCNC: 33.7 % (ref 32–34.5)
MCV RBC AUTO: 97.2 FL (ref 80–99.9)
METER GLUCOSE: 84 MG/DL (ref 74–99)
MONOCYTES ABSOLUTE: 0.54 E9/L (ref 0.1–0.95)
MONOCYTES RELATIVE PERCENT: 7.3 % (ref 2–12)
NEUTROPHILS ABSOLUTE: 3.52 E9/L (ref 1.8–7.3)
NEUTROPHILS RELATIVE PERCENT: 47.9 % (ref 43–80)
PDW BLD-RTO: 16.2 FL (ref 11.5–15)
PLATELET # BLD: 133 E9/L (ref 130–450)
PMV BLD AUTO: 10.6 FL (ref 7–12)
POTASSIUM REFLEX MAGNESIUM: 4.1 MMOL/L (ref 3.5–5)
RBC # BLD: 3.51 E12/L (ref 3.5–5.5)
SODIUM BLD-SCNC: 140 MMOL/L (ref 132–146)
WBC # BLD: 7.4 E9/L (ref 4.5–11.5)

## 2022-07-17 PROCEDURE — G0379 DIRECT REFER HOSPITAL OBSERV: HCPCS

## 2022-07-17 PROCEDURE — 80048 BASIC METABOLIC PNL TOTAL CA: CPT

## 2022-07-17 PROCEDURE — 82962 GLUCOSE BLOOD TEST: CPT

## 2022-07-17 PROCEDURE — G0378 HOSPITAL OBSERVATION PER HR: HCPCS

## 2022-07-17 PROCEDURE — 85025 COMPLETE CBC W/AUTO DIFF WBC: CPT

## 2022-07-17 PROCEDURE — 93005 ELECTROCARDIOGRAM TRACING: CPT | Performed by: EMERGENCY MEDICINE

## 2022-07-17 PROCEDURE — 70551 MRI BRAIN STEM W/O DYE: CPT

## 2022-07-17 PROCEDURE — 6370000000 HC RX 637 (ALT 250 FOR IP): Performed by: INTERNAL MEDICINE

## 2022-07-17 RX ORDER — PREDNISONE 1 MG/1
5 TABLET ORAL DAILY
Status: DISCONTINUED | OUTPATIENT
Start: 2022-07-18 | End: 2022-07-20 | Stop reason: HOSPADM

## 2022-07-17 RX ORDER — ATORVASTATIN CALCIUM 40 MG/1
40 TABLET, FILM COATED ORAL NIGHTLY
Status: DISCONTINUED | OUTPATIENT
Start: 2022-07-17 | End: 2022-07-20 | Stop reason: HOSPADM

## 2022-07-17 RX ORDER — CALCIUM CARBONATE 500(1250)
1000 TABLET ORAL DAILY
Status: DISCONTINUED | OUTPATIENT
Start: 2022-07-17 | End: 2022-07-20 | Stop reason: HOSPADM

## 2022-07-17 RX ORDER — CITALOPRAM 20 MG/1
20 TABLET ORAL DAILY
COMMUNITY
End: 2022-09-20 | Stop reason: SDUPTHER

## 2022-07-17 RX ORDER — ASPIRIN 81 MG/1
81 TABLET, CHEWABLE ORAL DAILY
Status: DISCONTINUED | OUTPATIENT
Start: 2022-07-17 | End: 2022-07-20 | Stop reason: HOSPADM

## 2022-07-17 RX ORDER — VITAMIN B COMPLEX
1000 TABLET ORAL DAILY
Status: DISCONTINUED | OUTPATIENT
Start: 2022-07-18 | End: 2022-07-20 | Stop reason: HOSPADM

## 2022-07-17 RX ORDER — PANTOPRAZOLE SODIUM 40 MG/1
40 TABLET, DELAYED RELEASE ORAL DAILY
Status: DISCONTINUED | OUTPATIENT
Start: 2022-07-18 | End: 2022-07-20 | Stop reason: HOSPADM

## 2022-07-17 RX ADMIN — ATORVASTATIN CALCIUM 40 MG: 40 TABLET, FILM COATED ORAL at 20:58

## 2022-07-17 RX ADMIN — ASPIRIN 81 MG CHEWABLE TABLET 81 MG: 81 TABLET CHEWABLE at 18:42

## 2022-07-17 ASSESSMENT — PAIN SCALES - GENERAL
PAINLEVEL_OUTOF10: 0

## 2022-07-17 ASSESSMENT — PAIN - FUNCTIONAL ASSESSMENT: PAIN_FUNCTIONAL_ASSESSMENT: NONE - DENIES PAIN

## 2022-07-18 LAB
EKG ATRIAL RATE: 48 BPM
EKG ATRIAL RATE: 48 BPM
EKG P AXIS: 26 DEGREES
EKG P AXIS: 28 DEGREES
EKG P-R INTERVAL: 198 MS
EKG P-R INTERVAL: 198 MS
EKG Q-T INTERVAL: 458 MS
EKG Q-T INTERVAL: 462 MS
EKG QRS DURATION: 86 MS
EKG QRS DURATION: 86 MS
EKG QTC CALCULATION (BAZETT): 409 MS
EKG QTC CALCULATION (BAZETT): 412 MS
EKG R AXIS: -10 DEGREES
EKG R AXIS: -9 DEGREES
EKG T AXIS: -10 DEGREES
EKG T AXIS: -4 DEGREES
EKG VENTRICULAR RATE: 48 BPM
EKG VENTRICULAR RATE: 48 BPM

## 2022-07-18 PROCEDURE — 6370000000 HC RX 637 (ALT 250 FOR IP): Performed by: INTERNAL MEDICINE

## 2022-07-18 PROCEDURE — 99221 1ST HOSP IP/OBS SF/LOW 40: CPT | Performed by: PSYCHIATRY & NEUROLOGY

## 2022-07-18 PROCEDURE — G0378 HOSPITAL OBSERVATION PER HR: HCPCS

## 2022-07-18 PROCEDURE — 97161 PT EVAL LOW COMPLEX 20 MIN: CPT

## 2022-07-18 RX ADMIN — CALCIUM 1000 MG: 500 TABLET ORAL at 08:51

## 2022-07-18 RX ADMIN — PREDNISONE 5 MG: 5 TABLET ORAL at 08:51

## 2022-07-18 RX ADMIN — Medication 1000 UNITS: at 08:51

## 2022-07-18 RX ADMIN — ATORVASTATIN CALCIUM 40 MG: 40 TABLET, FILM COATED ORAL at 20:48

## 2022-07-18 RX ADMIN — ASPIRIN 81 MG CHEWABLE TABLET 81 MG: 81 TABLET CHEWABLE at 08:51

## 2022-07-18 RX ADMIN — PANTOPRAZOLE SODIUM 40 MG: 40 TABLET, DELAYED RELEASE ORAL at 08:51

## 2022-07-18 ASSESSMENT — PAIN SCALES - GENERAL
PAINLEVEL_OUTOF10: 0

## 2022-07-18 NOTE — PLAN OF CARE
Problem: Discharge Planning  Goal: Discharge to home or other facility with appropriate resources  Outcome: Progressing  Flowsheets (Taken 7/17/2022 1801 by Emma Ribeiro RN)  Discharge to home or other facility with appropriate resources: Identify barriers to discharge with patient and caregiver     Problem: Pain  Goal: Verbalizes/displays adequate comfort level or baseline comfort level  Outcome: Progressing     Problem: Safety - Adult  Goal: Free from fall injury  Outcome: Progressing     Problem: ABCDS Injury Assessment  Goal: Absence of physical injury  Outcome: Progressing

## 2022-07-18 NOTE — CARE COORDINATION
Here as observation- and ntfd patient of this. Transfer from [facility]  for aphasia and altered mental status. Neurology consult. Pt/Ot ordered. Patient is currently alert and oriented. Met with her to discuss role and transition of care. She lives with her  in a condo. Her PCP is Dr Bro Wong and uses [facility]. No DME owned has had c unsure of with whom, and has been to [facility] in past. Her discharge plan is to return home. cm/sw to follow. Electronically signed by Margie Samayoa RN on 7/18/2022 at 11:09 AM

## 2022-07-18 NOTE — PROGRESS NOTES
Physical Therapy  Physical Therapy Initial Assessment       Name: Loretta Delatorre  : 3/3/1932  MRN: [id number]      Date of Service: 2022    Evaluating PT:  Franklyn Seymour PT, DPT  [id number]    Room #:  8826/4591-U  Diagnosis:  TIA (transient ischemic attack) [G45.9]  PMHx/PSHx:   has a past medical history of FABIO (acute kidney injury) ([address] Cervical cancer ([address] Chest pain, Chronic renal impairment, stage 2 (mild), Erosive osteoarthritis of multiple sites, Former smoker, stopped smoking in distant past, Gastritis, GERD (gastroesophageal reflux disease), Glucose intolerance (impaired glucose tolerance), HTN (hypertension), ILD (interstitial lung disease) ([address] Microscopic polyangiitis ([address] MVP (mitral valve prolapse), Near syncope, Orthostatic hypotension, Pseudogout of left wrist, Pulmonary embolus ([address] Pulmonary fibrosis ([address] Pulmonary nodule, Sjogren's disease ([address] Volume depletion, and Wegener's granulomatosis (granulomatosis with polyangiitis). Procedure/Surgery:    Precautions:  Falls, Safety  Equipment Needs:  TBD    SUBJECTIVE:    Pt lives with spouse in a 1 story condo with no stairs to enter. Bed is on first floor and bath is on first floor. Pt ambulated with no device independently PTA. Used SPC for long distance. Equipment Owned: [facility] and [facility]    OBJECTIVE:   Initial Evaluation  Date: 22 Treatment Short Term/ Long Term   Goals   AM-PAC 6 Clicks 45/53     Was pt agreeable to Eval/treatment? Yes     Does pt have pain? No c/o pain     Bed Mobility  Rolling: Supervision  Supine to sit: supervision  Sit to supine: supervision  Scooting: supervision  Rolling: Independent  Supine to sit:  Independent  Sit to supine: Independent  Scooting: Independent     Transfers Sit to stand: SBA  Stand to sit: SBA  Stand pivot: CGA no AD  Sit to stand: Modified Independent    Stand to sit: Modified Independent    Stand pivot: Modified Independent     Ambulation    100 feet with CGA no AD  >150 feet with Modified Independent  AAD   Stair negotiation: ascended and descended  NT  4 steps with single rail Modified Independent     ROM BUE:  Defer to OT  BLE:  WFL     Strength BUE:  Defer to OT  BLE:  4+/5  Improve 1 MMT   Balance Sitting EOB:  SBA  Dynamic Standing:  CGA no AD  Sitting EOB:  Independent    Dynamic Standing:  Modified Independent       Pt is A & O x 4. No speech deficits noted  Sensation:  appreciates LT all 4 extremities  Edema: WNL    Vitals:    HR 70  Spo2 96%  PRE  HR 75  Spo2 95%   ACTIVITY  /70 seated    ACTIVITY  HR 80  Spo2 97%  POST      Therapeutic Exercises:  functional mobility    Patient education  Pt educated on role of PT    Patient response to education:   Pt verbalized understanding Pt demonstrated skill Pt requires further education in this area   x x x     ASSESSMENT:    Conditions Requiring Skilled Therapeutic Intervention:    [x]Decreased strength     []Decreased ROM  [x]Decreased functional mobility  [x]Decreased balance   [x]Decreased endurance   []Decreased posture  []Decreased sensation  []Decreased coordination   []Decreased vision  [x]Decreased safety awareness   [x]Increased pain       Comments:  Pt agreeable to PT evaluation. Pt performed bed mobility without assist. Pt performed sit <> stand without assist. Pt requiring cues for safety/pacing with ambulation,as pt has 2x LOB requiring CGA when she became distracted. Patient would benefit from continued skilled PT to maximize functional mobility independence. Treatment:  Patient practiced and was instructed in the following treatment:    Functional transfers-Verbal cues for proper positioning and sequencing to perform transfers safely with maximum independence. Gait training-Verbal cues for proper positioning and sequencing using assistive device to maximize functional mobility independence. Pt's/ family goals   1. Get better    Prognosis is good for reaching above PT goals.     Patient and or family understand(s) diagnosis, prognosis, and plan of care. yes    PHYSICAL THERAPY PLAN OF CARE:    PT POC is established based on physician order and patient diagnosis     Referring provider/PT Order:    07/18/22 0815  PT eval and treat  Start:  07/18/22 0815,   End:  07/18/22 0815,   ONE TIME,   Standing Count:  1 Occurrences,   R         Zach Miah Baptiste MD     Diagnosis:  TIA (transient ischemic attack) [G45.9]  Specific instructions for next treatment:  Gait and balance training    Current Treatment Recommendations:     [x] Strengthening to improve independence with functional mobility   [x] ROM to improve independence with functional mobility   [x] Balance Training to improve static/dynamic balance and to reduce fall risk  [x] Endurance Training to improve activity tolerance during functional mobility   [x] Transfer Training to improve safety and independence with all functional transfers   [x] Gait Training to improve gait mechanics, endurance and assess need for appropriate assistive device  [] Stair Training in preparation for safe discharge home and/or into the community   [x] Positioning to prevent skin breakdown and contractures  [x] Safety and Education Training    Patient/Caregiver Education   [x][x] HEP  [] Other     PT long term treatment goals are located in above grid    Frequency of treatments: 2-5x/week x 1-2 weeks. Time in  1520  Time out  1550    Total Treatment Time  0 minutes     Evaluation Time includes thorough review of current medical information, gathering information on past medical history/social history and prior level of function, completion of standardized testing/informal observation of tasks, assessment of data and education on plan of care and goals.     CPT codes:  [x] Low Complexity PT evaluation 93415  [] Moderate Complexity PT evaluation 43689  [] High Complexity PT evaluation 63698  [] PT Re-evaluation 73809  [] Gait training 89944 0 minutes  [] Manual therapy 78629 0 minutes  [] Therapeutic activities 31036 0 minutes  [] Therapeutic exercises 77797 0 minutes  [] Neuromuscular reeducation 80583 0 minutes       Oz Mancilla PT, DPT   [id number]

## 2022-07-18 NOTE — H&P
[facility]  History and Physical      CHIEF COMPLAINT: Issues with aphasia and altered mental status    Reason for Admission: Suspected TIA versus stroke    History Obtained From: Patient    PCP :  Marlyn Mahoney MD    [address] / [place] [id number]      HISTORY OF PRESENT ILLNESS:      The patient is a 80 y.o. female was sent to the emergency room at [facility] because of aphasia and altered mental status. Apparently patient has slurred speech. Patient was also noted to be confused. She was then brought to the emergency room. Apparently by the time she reached emergency room her symptoms have resolved. Patient was then admitted for further evaluation and treatment. She was then transferred to our institution for neurology evaluation.     Past Medical History:        Diagnosis Date    FABIO (acute kidney injury) ([address] 6/17/2015    Cervical cancer ([address] 6/3/2015    1970    Chest pain 6/3/2015    Chronic renal impairment, stage 2 (mild) 9/30/2015    Erosive osteoarthritis of multiple sites 6/3/2015    2005    Former smoker, stopped smoking in distant past     Gastritis 6/3/2015    GERD (gastroesophageal reflux disease) 6/3/2015    Glucose intolerance (impaired glucose tolerance) 6/3/2015    2004    HTN (hypertension) 6/3/2015    ILD (interstitial lung disease) ([address] 6/9/2015    Microscopic polyangiitis ([address] 6/16/2015    MVP (mitral valve prolapse) 6/3/2015    1982    Near syncope 6/3/2015    Orthostatic hypotension 6/4/2015    Pseudogout of left wrist 6/3/2015    2005    Pulmonary embolus ([address] 9/30/2015    BILATERAL    Pulmonary fibrosis ([address] 6/4/2015    Pulmonary nodule 6/4/2015    Sjogren's disease ([address] 6/9/2015    Volume depletion 6/17/2015    Wegener's granulomatosis (granulomatosis with polyangiitis)      Past Surgical History:        Procedure Laterality Date    ANKLE FRACTURE SURGERY Right 6/30/2019    ANKLE OPEN REDUCTION INTERNAL FIXATION performed by Argentina Gallardo MD at [address]    ECHO COMPL W DOP COLOR FLOW  2015         KIDNEY BIOPSY      [facility]    TONSILLECTOMY      TOTAL KNEE ARTHROPLASTY Bilateral     matthew         Medications Prior to Admission:    Medications Prior to Admission: citalopram (CELEXA) 20 MG tablet, Take 20 mg by mouth in the morning.   pantoprazole (PROTONIX) 40 MG tablet, Take 1 tablet by mouth daily  calcium carbonate (OSCAL) 500 MG TABS tablet, Take 1,000 mg by mouth daily  Vitamin D (CHOLECALCIFEROL) 25 MCG (1000 UT) TABS tablet, Take 1,000 Units by mouth daily  predniSONE (DELTASONE) 5 MG tablet, TAKE 1 TABLET BY MOUTH EVERY DAY  Multiple Vitamins-Minerals (MULTI VITAMIN/MINERALS PO), Take 1 tablet by mouth daily    Allergies:  Codeine and Penicillins    Social History:   Social History     Socioeconomic History    Marital status:      Spouse name: Not on file    Number of children: 4    Years of education: Not on file    Highest education level: Not on file   Occupational History    Occupation:    Tobacco Use    Smoking status: Former     Packs/day: 0.50     Years: 9.00     Pack years: 4.50     Types: Cigarettes     Start date: 10/23/1948     Quit date: 10/23/1957     Years since quittin.7    Smokeless tobacco: Never   Vaping Use    Vaping Use: Never used   Substance and Sexual Activity    Alcohol use: No     Alcohol/week: 0.0 standard drinks    Drug use: No    Sexual activity: Not on file   Other Topics Concern    Not on file   Social History Narrative    Not on file     Social Determinants of Health     Financial Resource Strain: Not on file   Food Insecurity: Not on file   Transportation Needs: Not on file   Physical Activity: Not on file   Stress: Not on file   Social Connections: Not on file   Intimate Partner Violence: Not on file   Housing Stability: Not on file         Family History:       Problem Relation Age of Onset    Cancer Mother     Stroke Father     Breast Cancer Sister     Other Brother         brain tumor    Breast Cancer Sister        REVIEW OF SYSTEMS:    General ROS: negative  Hematological and Lymphatic ROS: negative  Endocrine ROS: negative  Respiratory ROS: no cough,  wheezing  or shortness of breath,   Cardiovascular ROS: no chest pain or dyspnea on exertion  Gastrointestinal ROS: no abdominal pain, change in bowel habits, or black or bloody stools  Genito-Urinary ROS: no dysuria, trouble voiding, or hematuria  Neurological ROS: Positive for aphasia and altered mental status which has resolved currently    Vitals:  /66   Pulse 51   Temp 96.9 °F (36.1 °C) (Temporal)   Resp 16   Ht 5' 6\" (1.676 m)   Wt 160 lb (72.6 kg)   SpO2 95%   BMI 25.82 kg/m²     PHYSICAL EXAM:  General:  Awake, alert, oriented X 3. Well developed, well nourished, well groomed. No apparent distress. HEENT:  Normocephalic, atraumatic. Pupils equal, round, reactive to light. No scleral icterus. No conjunctival injection. Neck:  Supple, no carotid bruits  Heart:  RRR,   Lungs:  CTA bilaterally, bilat symmetrical expansion, no wheeze, rales, or rhonchi  Abdomen: Bowel sounds present, soft, nontender, no masses, no organomegaly, no peritoneal signs  Extremities:  No clubbing, cyanosis, or edema  Skin:  Warm and dry, no open lesions or rash  Neuro:  Cranial nerves 2-12 intact, no focal deficits      DATA:     No results found for this or any previous visit (from the past 24 hour(s)). No orders to display           ASSESSMENT :      Principal Problem:    TIA (transient ischemic attack)  Resolved Problems:    * No resolved hospital problems.  *  History of granulomatosis with polyangiitis  History of cervical cancer  Hypertension by history  GERD by history  History of interstitial lung disease  History of depression    Plan :  Aspirin, statin  Neurology to see    Electronically signed by Mia Logan MD on 7/18/2022 at 9:44 AM    NOTE: This report was transcribed using voice recognition software.  Every effort was made to ensure accuracy; however, inadvertent transcription errors may be present

## 2022-07-18 NOTE — PLAN OF CARE
Problem: Discharge Planning  Goal: Discharge to home or other facility with appropriate resources  7/18/2022 0755 by Becka Mcghee RN  Outcome: Progressing     Problem: Pain  Goal: Verbalizes/displays adequate comfort level or baseline comfort level  7/18/2022 0755 by Becka Mcghee RN  Outcome: Progressing     Problem: Safety - Adult  Goal: Free from fall injury  7/18/2022 0755 by Becka Mcghee RN  Outcome: Progressing

## 2022-07-18 NOTE — CONSULTS
NEUROLOGY CONSULT NOTE      Requesting Physician: Clarence Andersen MD    Reason for Consult:  Evaluate for TIA    History of Present Illness:  Juanpablo Alcantara is a 80 y.o. female  with h/o HTN, orthostatic hypotension, pulmonary embolism, pulmonary fibrosis, Wegener's granulomatosis, Sjogren's syndrome, cervical cancer in 1970, chronic renal impairment stage II, GERD, who was admitted to [facility] on 7/17/2022 with presentation of aphasia and altered mental status. The night before presentation, patient had experienced some slowness of her speech at around 10 PM.  When she awoke the following morning she felt a little confused along with some difficulty with other speech retrospect. Getting her words out. Symptoms are not resolved by the time she arrived to the emergency room for evaluation. There was no report of any focal weakness, numbness, tingling, vision change, hearing loss, gait or balance disturbance. Her NIHSS at time of presentation was 0. CT scan of the head done did not show any acute intracranial abnormalities. She is following twice about 2 to 3 days prior to presentation up with her report of no associated injuries. CT of the cervical spine was done at time of admission with no acute or significant cervical pathology found. Subsequent MRI of the brain was performed showing a remote right cerebellar infarct but no acute intracranial abnormalities. There is only mild chronic small vessel ischemic changes noted in the brain.       Past Medical History:        Diagnosis Date    FABIO (acute kidney injury) ([facility] 6/17/2015    Cervical cancer ([facility] 6/3/2015    1970    Chest pain 6/3/2015    Chronic renal impairment, stage 2 (mild) 9/30/2015    Erosive osteoarthritis of multiple sites 6/3/2015    2005    Former smoker, stopped smoking in distant past     Gastritis 6/3/2015    GERD (gastroesophageal reflux disease) 6/3/2015    Glucose intolerance (impaired glucose tolerance) 6/3/2015 2004    HTN (hypertension) 6/3/2015    ILD (interstitial lung disease) ([address] 2015    Microscopic polyangiitis ([address] 2015    MVP (mitral valve prolapse) 6/3/2015    1982    Near syncope 6/3/2015    Orthostatic hypotension 2015    Pseudogout of left wrist 6/3/2015    2005    Pulmonary embolus ([address] 2015    BILATERAL    Pulmonary fibrosis ([address] 2015    Pulmonary nodule 2015    Sjogren's disease [address] 2015    Volume depletion 2015    Wegener's granulomatosis (granulomatosis with polyangiitis)            Procedure Laterality Date    ANKLE FRACTURE SURGERY Right 2019    ANKLE OPEN REDUCTION INTERNAL FIXATION performed by Allen Mijares MD at [address] [place] [address]    ECHO COMPL W DOP COLOR FLOW  2015         [facility]      TOTAL KNEE ARTHROPLASTY Bilateral     matthew       Social History:  Social History     Tobacco Use   Smoking Status Former    Packs/day: 0.50    Years: 9.00    Pack years: 4.50    Types: Cigarettes    Start date: 10/23/1948    Quit date: 10/23/1957    Years since quittin.7   Smokeless Tobacco Never     Social History     Substance and Sexual Activity   Alcohol Use No    Alcohol/week: 0.0 standard drinks     Social History     Substance and Sexual Activity   Drug Use No         Family History:       Problem Relation Age of Onset    Cancer Mother     Stroke Father     Breast Cancer Sister     Other Brother         brain tumor    Breast Cancer Sister        Review of Systems:  Constitutional:  Negative for chills and fever. HENT:  Negative for ear pain, sinus pressure and sore throat. Eyes:  Negative for pain, discharge and redness. Respiratory:  Negative for cough, shortness of breath and wheezing. Cardiovascular:  Negative for chest pain. Gastrointestinal:  Negative for abdominal pain, diarrhea, nausea and vomiting. Genitourinary:  Negative for dysuria and frequency.   Musculoskeletal:  Negative for arthralgias and back pain. Skin:  Negative for rash and wound. Neurological:  Positive for speech difficulty (resolved). Negative for weakness and headaches. Hematological:  Negative for adenopathy. All other systems reviewed and are negative. Allergies: Allergies   Allergen Reactions    Codeine Hives and Itching    Penicillins Hives, Itching and Rash     Tolerated cephalosporin in the past        Current Medications:   predniSONE (DELTASONE) tablet 5 mg, Daily  Vitamin D (CHOLECALCIFEROL) tablet 1,000 Units, Daily  calcium elemental (OSCAL) tablet 1,000 mg, Daily  pantoprazole (PROTONIX) tablet 40 mg, Daily  aspirin chewable tablet 81 mg, Daily  atorvastatin (LIPITOR) tablet 40 mg, Nightly         Physical Exam:  /65   Pulse 68   Temp 97.3 °F (36.3 °C) (Temporal)   Resp 16   Ht 5' 6\" (1.676 m)   Wt 160 lb (72.6 kg)   SpO2 94%   BMI 25.82 kg/m²  I Body mass index is 25.82 kg/m². I   Wt Readings from Last 1 Encounters:   07/17/22 160 lb (72.6 kg)          HEENT: Normocephalic, atraumatic, no lesions or abnormalities noted. Neck:  supple with full ROM; no masses, nodes or bruits. Lungs:  clear to auscultation  bilaterally     CV: RRR without gallops or murmurs     Extremities: no c/c/e          Neurologic Exam   Mental Status:  Patient was alert, responsive, oriented, appropriate, answering questions, and following commands. Speech was fluent with normal sensorium and cognition. Cranial Nerves: Pupils were equal round and reactive to light and accommodation;    Visual fields were full on confrontation;  Funduscopic exam was normal; no pappilledema   Extraocular movements were intact; no nystagmus; Intact facial sensation to temp, pinprick, and light touch; Symmetric facial movements with good lip and eye closure bilaterally; Hearing was intact; Thorne was midline;    Normal palatal elevation with midline uvula  Trapezius strength of 5/5.      Tongue was midline with no atrophy or fasciculations  Motor Exam:  Strength was 5/5 throughout; normal tone and bulk; no atrophy or fasiculations noted. Sensory Exam:  Normal sensation to light touch, pin-prick, and temperature; No sensory extinction. Cerebella Exam:  Intact finger-nose-finger, rapidly alternating movements, fine motor movements, and heel-down-shin maneuvers; No cerebellar rebound or drift; No tremors   Normal tandem gait. Negative Romberg   Gait:  Gait was not tested. Reflexes: normal and symmetric bilaterally; Babinski is negative     Labs:    CBC:   Recent Labs     07/16/22  1549 07/17/22  0707   WBC 8.1 7.4   HGB 12.0 11.5    133   MCV 97.8 97.2   MCH 32.7 32.8   MCHC 33.4 33.7   RDW 16.1* 16.2*     CMP:  Recent Labs     07/16/22  1549 07/17/22  0707 07/17/22  0808    140  --    K 4.0 4.1  --     106  --    CO2 25 25  --    BUN 18 16  --    CREATININE 1.1* 1.0  --    GFRAA 56 >60  --    LABGLOM 47 52  --    GLUCOSE 100* 96 84   CALCIUM 8.9 8.6  --      Liver:   Recent Labs     07/16/22  1549   AST 21   ALT 14   ALKPHOS 74   PROT 6.3*   LABALBU 3.7   BILITOT 0.9     INR:   Recent Labs     07/16/22  1549   PROTIME 11.2   INR 1.0       ToxicologyNo results for input(s): PHENYTOIN, CARBTOT, PHENOBARB, VALPROATE in the last 72 hours. Invalid input(s): LAMOTRIG,  KEPPRA  No results for input(s): AMPMETHURSCR, BARBTQTU, BDZQTU, CANNABQUANT, COCMETQTU, OPIAU, PCPQUANT in the last 72 hours. Radiology:  CT Head WO Contrast    Result Date: 7/16/2022  EXAMINATION: CT OF THE HEAD WITHOUT CONTRAST  7/16/2022 3:58 pm TECHNIQUE: CT of the head was performed without the administration of intravenous contrast. Automated exposure control, iterative reconstruction, and/or weight based adjustment of the mA/kV was utilized to reduce the radiation dose to as low as reasonably achievable.  COMPARISON: June 29, 2019 HISTORY: ORDERING SYSTEM PROVIDED HISTORY: tia, aphasia, also has fallen twice, r/o bleed TECHNOLOGIST PROVIDED HISTORY: Reason for exam:->tia, aphasia, also has fallen twice, r/o bleed Has a \"code stroke\" or \"stroke alert\" been called? ->No Decision Support Exception - unselect if not a suspected or confirmed emergency medical condition->Emergency Medical Condition (MA) FINDINGS: No acute intracranial hemorrhage or edema. No abnormal extra-axial fluid collections. No hydrocephalus. No calvarial fracture. Mild mucosal thickening involving left maxillary sinus. Mastoid air cells are clear. No acute intracranial hemorrhage or edema. CT Cervical Spine WO Contrast    Result Date: 7/16/2022  EXAMINATION: CT OF THE CERVICAL SPINE WITHOUT CONTRAST 7/16/2022 3:58 pm TECHNIQUE: CT of the cervical spine was performed without the administration of intravenous contrast. Multiplanar reformatted images are provided for review. Automated exposure control, iterative reconstruction, and/or weight based adjustment of the mA/kV was utilized to reduce the radiation dose to as low as reasonably achievable. COMPARISON: June 29, 2019 HISTORY: ORDERING SYSTEM PROVIDED HISTORY: fall TECHNOLOGIST PROVIDED HISTORY: Reason for exam:->fall Decision Support Exception - unselect if not a suspected or confirmed emergency medical condition->Emergency Medical Condition (MA) FINDINGS: BONES/ALIGNMENT: There is no acute fracture or traumatic malalignment. DEGENERATIVE CHANGES: Severe disc space narrowing at C3/C4, C4/C5, C5/C6, and C6/C7. Degenerative posterior osteophytosis at C4/C5 results in moderate effacement of ventral thecal sac. This finding is similar compared to prior. There is stable anterolisthesis of C7 on T1 of approximately 5 mm. This finding is likely degenerative in etiology given significant facet arthropathy at this level. SOFT TISSUES: There is no prevertebral soft tissue swelling. No acute abnormality of the cervical spine.      XR CHEST PORTABLE    Result Date: 7/16/2022  EXAMINATION: ONE XRAY VIEW OF THE CHEST 7/16/2022 3:43 pm COMPARISON: May 23, 2020 HISTORY: ORDERING SYSTEM PROVIDED HISTORY: tia TECHNOLOGIST PROVIDED HISTORY: Reason for exam:->tia FINDINGS: Chronic irregular opacities bilaterally. No airspace opacity or pleural effusion. The heart is normal in size. No pneumothorax. 1. Chronic irregular opacities bilaterally. 2. No airspace opacity or pleural effusion. MRI BRAIN WO CONTRAST    Result Date: 7/17/2022  EXAMINATION: MRI OF THE BRAIN WITHOUT CONTRAST  7/17/2022 10:08 am TECHNIQUE: Multiplanar multisequence MRI of the brain was performed without the administration of intravenous contrast. COMPARISON: CT brain 07/16/2022 HISTORY: ORDERING SYSTEM PROVIDED HISTORY: tia TECHNOLOGIST PROVIDED HISTORY: Reason for exam:->tia What is the sedation requirement?->None Decision Support Exception - unselect if not a suspected or confirmed emergency medical condition->Emergency Medical Condition (MA) FINDINGS: INTRACRANIAL STRUCTURES/VENTRICLES: There are no areas of restricted diffusion identified to suggest an acute infarct. There is no acute intracranial hemorrhage. No mass effect or midline shift is present. There are multiple foci of abnormal increased T2/FLAIR signal intensity within the periventricular, subcortical and deep white matter of both hemispheres. There is similar signal abnormality present within the melva. There is no sellar or suprasellar mass present. There is no ventriculomegaly or abnormal extra-axial fluid collection present. The proximal portions of the [place] demonstrate normal flow voids. There is a remote right cerebellar infarct. ORBITS: Limited evaluation of the orbits is unremarkable. SINUSES: The paranasal sinuses and mastoid air cells are clear. BONES/SOFT TISSUES: Bone marrow signal intensity is normal.     1. No acute infarct or acute intracranial process identified. 2. Remote right cerebellar infarct. 3. Mild chronic small vessel ischemic changes.          The patient's records from referring provider and available information in the EHR was reviewed. Impression:  Transient expressive Aphasia  TIA  Remote CVA: as per MRI    Principal Problem:    TIA (transient ischemic attack)  Resolved Problems:    * No resolved hospital problems. *      Recommendations:                                            Aspirin 81 mg daily  Atorvastatin  20 ng daily   Continue other medications as before  Routine exercise   Completion of stroke work-up  Pleased re-consult if further need. It was my pleasure to evaluate Juanpablo Alcantara today. Please call with questions.       Electronically signed by Landen Galan MD on 7/18/2022 at 6:33 PM

## 2022-07-19 LAB
CHOLESTEROL, TOTAL: 173 MG/DL (ref 0–199)
HBA1C MFR BLD: 5.2 % (ref 4–5.6)
HDLC SERPL-MCNC: 73 MG/DL
LDL CHOLESTEROL CALCULATED: 86 MG/DL (ref 0–99)
LV EF: 58 %
LVEF MODALITY: NORMAL
TRIGL SERPL-MCNC: 71 MG/DL (ref 0–149)
VLDLC SERPL CALC-MCNC: 14 MG/DL

## 2022-07-19 PROCEDURE — 99232 SBSQ HOSP IP/OBS MODERATE 35: CPT | Performed by: PHYSICIAN ASSISTANT

## 2022-07-19 PROCEDURE — G0378 HOSPITAL OBSERVATION PER HR: HCPCS

## 2022-07-19 PROCEDURE — 93306 TTE W/DOPPLER COMPLETE: CPT

## 2022-07-19 PROCEDURE — 80061 LIPID PANEL: CPT

## 2022-07-19 PROCEDURE — 83036 HEMOGLOBIN GLYCOSYLATED A1C: CPT

## 2022-07-19 PROCEDURE — 36415 COLL VENOUS BLD VENIPUNCTURE: CPT

## 2022-07-19 PROCEDURE — 6370000000 HC RX 637 (ALT 250 FOR IP): Performed by: INTERNAL MEDICINE

## 2022-07-19 RX ORDER — ATORVASTATIN CALCIUM 40 MG/1
40 TABLET, FILM COATED ORAL NIGHTLY
Qty: 30 TABLET | Refills: 3 | Status: SHIPPED | OUTPATIENT
Start: 2022-07-19 | End: 2022-09-27

## 2022-07-19 RX ORDER — ASPIRIN 81 MG/1
81 TABLET, CHEWABLE ORAL DAILY
Qty: 30 TABLET | Refills: 3 | Status: SHIPPED | OUTPATIENT
Start: 2022-07-20

## 2022-07-19 RX ADMIN — PREDNISONE 5 MG: 5 TABLET ORAL at 08:27

## 2022-07-19 RX ADMIN — PANTOPRAZOLE SODIUM 40 MG: 40 TABLET, DELAYED RELEASE ORAL at 08:27

## 2022-07-19 RX ADMIN — CALCIUM 1000 MG: 500 TABLET ORAL at 08:27

## 2022-07-19 RX ADMIN — Medication 1000 UNITS: at 08:27

## 2022-07-19 RX ADMIN — ATORVASTATIN CALCIUM 40 MG: 40 TABLET, FILM COATED ORAL at 21:09

## 2022-07-19 RX ADMIN — ASPIRIN 81 MG CHEWABLE TABLET 81 MG: 81 TABLET CHEWABLE at 08:27

## 2022-07-19 ASSESSMENT — PAIN SCALES - GENERAL
PAINLEVEL_OUTOF10: 0

## 2022-07-19 NOTE — PROGRESS NOTES
[facility]  Neurology follow up     Date:  7/19/2022  Patient Name:  Amanda Turner  YOB: 1932  MRN: [id number]     PCP:  Naeem Au MD   Referring:  Michelle Tellez MD    Assessment  TIA - that presented as transient expressive aphasia, resolved    MRI brain negative for stroke   Echo completed, report pending   Will obtain vessel imaging for completion of workup      Remote R cerebellar infarct noted on MRI brain     Plan  Continue ASA and statin therapy with goal LDL < 70  Follow up lipid panel   CTA head/neck   Follow up echo results   If above testing unrevealing, okay to d/c from neurology POV with stroke clinic f/u     History of Present Illness:  Amanda Turner is a 80 y.o. right handed female presenting for evaluation of TIA. She presented with transient speech difficulties. This came on suddenly and her daughter wanted her to come to the hospital for evaluation. NIHSS on arrival 0. CT head with no acute findings. MRI brain showed no acute findings. She denies having any focal weakness, numbness or other symptoms with this event. She is currently back to baseline. Echo was completed this am. Report pending. Has not had vessel imaging completed yet. No chest pain or palpitations  No SOB  No vertigo, lightheadedness or loss of consciousness  No falls, tripping or stumbling  No incontinence of bowels or bladder  No itching or bruising appreciated  No numbness, tingling or focal arm/leg weakness    ROS otherwise negative      Allergies:       Allergies   Allergen Reactions    Codeine Hives and Itching    Penicillins Hives, Itching and Rash     Tolerated cephalosporin in the past        Physical Examination  Vitals   Vitals:    07/18/22 1500 07/18/22 1915 07/19/22 0300 07/19/22 0732   BP: 110/65 (!) 152/78 122/71 117/77   Pulse: 68 70 54 50   Resp: 16 17 16 16   Temp: 97.3 °F (36.3 °C) 97.7 °F (36.5 °C) (!) 96.7 °F (35.9 °C) 97.7 °F (36.5 °C)   TempSrc: Temporal Temporal Temporal Oral   SpO2: 94% 97% 96% 93%   Weight:       Height:            General: Patient appears in no acute distress. Awake and oriented x 4  HEENT: Normocephalic, atraumatic  Chest: Clear to auscultation bilaterally  Heart: No murmurs appreciated  Extremities/Peripheral vascular: No edema/swelling noted. No cold limbs noted. Neurologic Examination    Mental Status  Alert, and oriented to person, place and time. Speech is fluent with intact comprehension. No evidence of memory impairment. Attention and concentration appeared normal.     Cranial Nerves  II. Visual fields full to confrontation bilaterally. III, IV, VI: Pupils equally round and reactive to light, 3 to 2 mm bilaterally. EOMs: full, no nystagmus. V. Facial sensation intact to light touch bilaterally  VII: Facial movements symmetric and strong  VIII: Hearing intact to voice  IX,X: Palate elevates symmetrically.  No dysarthria  XI: Sternocleidomastoid and trapezius 5/5 bilaterally   XII: Tongue is midline    Motor     Right Left   Right Left   Deltoid 5 5  Hip Flexion 5 5   Biceps      5  5  Knee Extension 5 5   Triceps 5 5  Knee Flexion 5 5   Handgrip 5 5  Ankle Dorsiflexion 5 5       Ankle Plantarflexion 5 5     Tone: Normal in all four limbs    Bulk: Normal in all four limbs with no evidence of atrophy    Pronator drift: absent bilaterally    Sensation  Light Touch: Intact distally in all four limbs    Reflexes    No babinski     Coordination  Rapid alternating movements normal in bilateral upper extremities  Finger to nose testing normal bilaterally    Gait  Deferred for safety/fall consideration      Labs  Recent Labs     07/16/22  1549 07/17/22  0707 07/17/22  0808 07/19/22  1038    140  --   --    K 4.0 4.1  --   --     106  --   --    CO2 25 25  --   --    BUN 18 16  --   --    CREATININE 1.1* 1.0  --   --    GLUCOSE 100* 96 84  --    CALCIUM 8.9 8.6  --   --    PROT 6.3*  --   --   --    LABALBU 3.7  --   -- --    BILITOT 0.9  --   --   --    ALKPHOS 74  --   --   --    AST 21  --   --   --    ALT 14  --   --   --    WBC 8.1 7.4  --   --    RBC 3.67 3.51  --   --    HGB 12.0 11.5  --   --    HCT 35.9 34.1  --   --    MCV 97.8 97.2  --   --    MCH 32.7 32.8  --   --    MCHC 33.4 33.7  --   --    RDW 16.1* 16.2*  --   --     133  --   --    MPV 10.3 10.6  --   --    LABA1C  --   --   --  5.2       Imaging  CTA NECK W CONTRAST    (Results Pending)   CTA HEAD W CONTRAST    (Results Pending)         I have personally reviewed the following images: MRI brain     Echo- report pending       Electronically signed by EDUARDO Maddox on 7/19/2022 at 11:54 AM

## 2022-07-19 NOTE — PLAN OF CARE
Problem: Discharge Planning  Goal: Discharge to home or other facility with appropriate resources  Outcome: Progressing Towards Goal     Problem: Safety - Adult  Goal: Free from fall injury  Outcome: Progressing Towards Goal

## 2022-07-19 NOTE — PROGRESS NOTES
Subjective:    Chief complaint:  No further speech issues  No other complaints otherwise    Objective:    /77   Pulse 50   Temp 97.7 °F (36.5 °C) (Oral)   Resp 16   Ht 5' 6\" (1.676 m)   Wt 160 lb (72.6 kg)   SpO2 93%   BMI 25.82 kg/m²   General : Awake ,alert,no distress. Heart:  RRR, no murmurs, gallops, or rubs. Lungs:  CTA bilaterally, no wheeze, rales or rhonchi  Abd: bowel sounds present, nontender, nondistended, no masses  Extrem:  No clubbing, cyanosis, or edema    CBC:   Lab Results   Component Value Date/Time    WBC 7.4 07/17/2022 07:07 AM    RBC 3.51 07/17/2022 07:07 AM    HGB 11.5 07/17/2022 07:07 AM    HCT 34.1 07/17/2022 07:07 AM    MCV 97.2 07/17/2022 07:07 AM    MCH 32.8 07/17/2022 07:07 AM    MCHC 33.7 07/17/2022 07:07 AM    RDW 16.2 07/17/2022 07:07 AM     07/17/2022 07:07 AM    MPV 10.6 07/17/2022 07:07 AM     BMP:    Lab Results   Component Value Date/Time     07/17/2022 07:07 AM    K 4.1 07/17/2022 07:07 AM     07/17/2022 07:07 AM    CO2 25 07/17/2022 07:07 AM    BUN 16 07/17/2022 07:07 AM    LABALBU 3.7 07/16/2022 03:49 PM    LABALBU 4.3 12/21/2010 12:00 PM    CREATININE 1.0 07/17/2022 07:07 AM    CALCIUM 8.6 07/17/2022 07:07 AM    GFRAA >60 07/17/2022 07:07 AM    LABGLOM 52 07/17/2022 07:07 AM    GLUCOSE 84 07/17/2022 08:08 AM    GLUCOSE 78 12/21/2010 12:00 PM     PT/INR:    Lab Results   Component Value Date/Time    PROTIME 11.2 07/16/2022 03:49 PM    PROTIME 31.6 06/15/2016 12:00 AM    INR 1.0 07/16/2022 03:49 PM     Troponin:    Lab Results   Component Value Date/Time    TROPONINI <0.01 01/19/2020 10:45 PM       No results for input(s): LABURIN in the last 72 hours. No results for input(s): BC in the last 72 hours. No results for input(s): Donold Mooring in the last 72 hours.       Current Facility-Administered Medications:     perflutren lipid microspheres (DEFINITY) injection 1.65 mg, 1.5 mL, IntraVENous, ONCE PRN, EDUARDO Hull    predniSONE (Keely Mayer) tablet 5 mg, 5 mg, Oral, Daily, Xuan Palmer MD, 5 mg at 07/19/22 0827    Vitamin D (CHOLECALCIFEROL) tablet 1,000 Units, 1,000 Units, Oral, Daily, Xuan Palmer MD, 1,000 Units at 07/19/22 0827    calcium elemental (OSCAL) tablet 1,000 mg, 1,000 mg, Oral, Daily, Xuan Palmer MD, 1,000 mg at 07/19/22 0827    pantoprazole (PROTONIX) tablet 40 mg, 40 mg, Oral, Daily, Xuan Palmer MD, 40 mg at 07/19/22 0827    aspirin chewable tablet 81 mg, 81 mg, Oral, Daily, Xuan Palmer MD, 81 mg at 07/19/22 0827    atorvastatin (LIPITOR) tablet 40 mg, 40 mg, Oral, Nightly, Xuan Palmer MD, 40 mg at 07/18/22 2048    Diet NPO    CTA NECK W CONTRAST    (Results Pending)   CTA HEAD W CONTRAST    (Results Pending)       Assessment:    Principal Problem:    TIA (transient ischemic attack)  Resolved Problems:    * No resolved hospital problems. *  History of interstitial lung disease  History of granulomatosis with polyangiitis  Hypertension  Depression      Plan:    MRI is negative  Discharge once okay with neurology and work-up completed        Xuan Palmer MD  2:51 PM  7/19/2022    NOTE: This report was transcribed using voice recognition software.  Every effort was made to ensure accuracy; however, inadvertent transcription errors may be present

## 2022-07-19 NOTE — CARE COORDINATION
Remains as observation. Transfer from [facility]  for aphasia and altered mental status. Neurology consult. For CTA head and neck. Her discharge plan is to return home. cm/marry to follow. Electronically signed by Frederick Duke RN on 7/19/2022 at 1:41 PM

## 2022-07-20 ENCOUNTER — APPOINTMENT (OUTPATIENT)
Dept: CT IMAGING | Age: 87
End: 2022-07-20
Attending: INTERNAL MEDICINE
Payer: MEDICARE

## 2022-07-20 VITALS
BODY MASS INDEX: 25.71 KG/M2 | OXYGEN SATURATION: 98 % | SYSTOLIC BLOOD PRESSURE: 143 MMHG | DIASTOLIC BLOOD PRESSURE: 75 MMHG | WEIGHT: 160 LBS | TEMPERATURE: 97 F | HEART RATE: 63 BPM | HEIGHT: 66 IN | RESPIRATION RATE: 18 BRPM

## 2022-07-20 PROCEDURE — 70498 CT ANGIOGRAPHY NECK: CPT

## 2022-07-20 PROCEDURE — G0378 HOSPITAL OBSERVATION PER HR: HCPCS

## 2022-07-20 PROCEDURE — 99232 SBSQ HOSP IP/OBS MODERATE 35: CPT | Performed by: PHYSICIAN ASSISTANT

## 2022-07-20 PROCEDURE — 6370000000 HC RX 637 (ALT 250 FOR IP): Performed by: INTERNAL MEDICINE

## 2022-07-20 PROCEDURE — 93242 EXT ECG>48HR<7D RECORDING: CPT

## 2022-07-20 PROCEDURE — 70496 CT ANGIOGRAPHY HEAD: CPT

## 2022-07-20 PROCEDURE — 6360000004 HC RX CONTRAST MEDICATION: Performed by: RADIOLOGY

## 2022-07-20 RX ADMIN — CALCIUM 1000 MG: 500 TABLET ORAL at 10:19

## 2022-07-20 RX ADMIN — ASPIRIN 81 MG CHEWABLE TABLET 81 MG: 81 TABLET CHEWABLE at 10:19

## 2022-07-20 RX ADMIN — PREDNISONE 5 MG: 5 TABLET ORAL at 10:19

## 2022-07-20 RX ADMIN — IOPAMIDOL 60 ML: 755 INJECTION, SOLUTION INTRAVENOUS at 07:37

## 2022-07-20 RX ADMIN — Medication 1000 UNITS: at 10:19

## 2022-07-20 RX ADMIN — PANTOPRAZOLE SODIUM 40 MG: 40 TABLET, DELAYED RELEASE ORAL at 10:19

## 2022-07-20 ASSESSMENT — PAIN SCALES - GENERAL: PAINLEVEL_OUTOF10: 0

## 2022-07-20 NOTE — PROGRESS NOTES
Subjective:    Chief complaint:  Lucía Church last night trying to go to the restroom  No loss of conscious  So far doing well    Objective:    BP (!) 143/75   Pulse 63   Temp 97 °F (36.1 °C) (Temporal)   Resp 18   Ht 5' 6\" (1.676 m)   Wt 160 lb (72.6 kg)   SpO2 98%   BMI 25.82 kg/m²   General : Awake ,alert,no distress. Heart:  RRR, no murmurs, gallops, or rubs. Lungs:  CTA bilaterally, no wheeze, rales or rhonchi  Abd: bowel sounds present, nontender, nondistended, no masses  Extrem:  No clubbing, cyanosis, or edema    CBC:   Lab Results   Component Value Date/Time    WBC 7.4 07/17/2022 07:07 AM    RBC 3.51 07/17/2022 07:07 AM    HGB 11.5 07/17/2022 07:07 AM    HCT 34.1 07/17/2022 07:07 AM    MCV 97.2 07/17/2022 07:07 AM    MCH 32.8 07/17/2022 07:07 AM    MCHC 33.7 07/17/2022 07:07 AM    RDW 16.2 07/17/2022 07:07 AM     07/17/2022 07:07 AM    MPV 10.6 07/17/2022 07:07 AM     BMP:    Lab Results   Component Value Date/Time     07/17/2022 07:07 AM    K 4.1 07/17/2022 07:07 AM     07/17/2022 07:07 AM    CO2 25 07/17/2022 07:07 AM    BUN 16 07/17/2022 07:07 AM    LABALBU 3.7 07/16/2022 03:49 PM    LABALBU 4.3 12/21/2010 12:00 PM    CREATININE 1.0 07/17/2022 07:07 AM    CALCIUM 8.6 07/17/2022 07:07 AM    GFRAA >60 07/17/2022 07:07 AM    LABGLOM 52 07/17/2022 07:07 AM    GLUCOSE 84 07/17/2022 08:08 AM    GLUCOSE 78 12/21/2010 12:00 PM     PT/INR:    Lab Results   Component Value Date/Time    PROTIME 11.2 07/16/2022 03:49 PM    PROTIME 31.6 06/15/2016 12:00 AM    INR 1.0 07/16/2022 03:49 PM     Troponin:    Lab Results   Component Value Date/Time    TROPONINI <0.01 01/19/2020 10:45 PM       No results for input(s): LABURIN in the last 72 hours. No results for input(s): BC in the last 72 hours. No results for input(s): Betaries Moyain in the last 72 hours.       Current Facility-Administered Medications:     perflutren lipid microspheres (DEFINITY) injection 1.65 mg, 1.5 mL, IntraVENous, ONCE PRN, [place] EDUARDO Palacio    predniSONE (DELTASONE) tablet 5 mg, 5 mg, Oral, Daily, Jack An MD, 5 mg at 07/20/22 1019    Vitamin D (CHOLECALCIFEROL) tablet 1,000 Units, 1,000 Units, Oral, Daily, Jack An MD, 1,000 Units at 07/20/22 1019    calcium elemental (OSCAL) tablet 1,000 mg, 1,000 mg, Oral, Daily, Jack An MD, 1,000 mg at 07/20/22 1019    pantoprazole (PROTONIX) tablet 40 mg, 40 mg, Oral, Daily, Jack An MD, 40 mg at 07/20/22 1019    aspirin chewable tablet 81 mg, 81 mg, Oral, Daily, Jack An MD, 81 mg at 07/20/22 1019    atorvastatin (LIPITOR) tablet 40 mg, 40 mg, Oral, Nightly, Jack An MD, 40 mg at 07/19/22 2109    ADULT DIET; Regular; Low Sodium (2 gm)    CTA NECK W CONTRAST   Preliminary Result   Unremarkable CTA of the head and neck. 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 CTA HEAD W CONTRAST   Preliminary Result   Unremarkable CTA of the head and neck. 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 Assessment:    Principal Problem:    TIA (transient ischemic attack)  Resolved Problems:    * No resolved hospital problems. *  History of interstitial lung disease  History of granulomatosis with polyangiitis  Hypertension  Depression      Plan:    Echo results noted  CTA neck and head pending  PT eval from yesterday noted ampac score 18  Discharge if ambulating okay today and CTA of the head and neck carotid        Jack An MD  10:32 AM  7/20/2022    NOTE: This report was transcribed using voice recognition software.  Every effort was made to ensure accuracy; however, inadvertent transcription errors may be present

## 2022-07-20 NOTE — PROGRESS NOTES
Pt had a fall in her room near the bathroom. Pt was unsure how she fell. She later stated that she may have stumbled over the bed side table. Pt 's fall occurred approx. 9217. Pt had vitals taken and her doctor notified (Lara Rowe). Pt's  Renato Radford was notified.

## 2022-07-20 NOTE — PROGRESS NOTES
CLINICAL PHARMACY NOTE: MEDS TO BEDS    Total # of Prescriptions Filled: 2   The following medications were delivered to the patient:  Atorvastatin calcium 40 mg  Aspirin low dose 81 mg chewable  Delivered to pt @ 2:55p    Additional Documentation:

## 2022-07-20 NOTE — PROGRESS NOTES
Applied Zio monitor for 14 days. LZ#[id number]. Instructed patient to avoid showering in the first 24 hours. Press the button on the monitor when you feel a symptom and write it in your diary. Do not submerge in water. No lotion or power near the patch. Please return the monitor in the box provided. Patient verbalized understanding. This RN to register the device.

## 2022-07-20 NOTE — PROGRESS NOTES
** below are added to pt discharge instructions     ZIO Patch - Cardiac Event Monitor Instructions      You are being prescribed a Zio by iRhythm heart monitor to wear over the next 14 days or as specified by your provider. Your Zio came with a prepaid [employer]PS postage to return the monitor at the end of the wear. Make sure to keep the box and/or prepaid envelope that came with your Zio monitor and mail the device via USPS promptly at the end of wear. FOR ASSISTANCE WITH YOU Debora Braxton CALL CUSTOMER CARE  at [phone]    Patient Instructions:  - Avoid showering for the first 24 hours  - For Zio AT (MCT) Monitor - Keep the black gateway nearby   - Press the button on the center of your [place] when you feel symptoms   - Log the symptom in your log book OR download the free [email or web address].com susi to log your symptoms on your phone  - Wear the [place] for 14 days or as specified you your provider  - Removal instructions are included with your monitor    [facility] [place] [address]  at [phone]  - Skin reaction  - Device removed accidentally   - Flashing orange light       Insurance Information:  Your insurance company may send an Explanation of Benefits (EOB). An EOB from your insurance carrier is NOT a bill. You will be responsible for usual out-of-pocket cost associated with deductibles and co-insurances determined by your instance provider. If there is a balance due, you will receive a statement from iRhythm monitor a cash pay option is available and financial assistance programs are available for those who qualify. For insurance coverage, financial assistance, or out-of-pocket estimates, call Zio at [phone]    Prompt Return of the Zio Monitor:  The device and kit components must be returned immediately upon completion of wear using the pre-paid . Delays in return may result in delayed final test results.     FOR ASSISTANCE WITH YOUR Vishalsam Irais CALL CUSTOMER CARE  at [phone]

## 2022-07-20 NOTE — DISCHARGE INSTRUCTIONS
ZIO Patch - Cardiac Event Monitor Instructions        You are being prescribed a Zio by iRhythm heart monitor to wear over the next 14 days or as specified by your provider. Your Zio came with a prepaid USPS postage to return the monitor at the end of the wear. Make sure to keep the box and/or prepaid envelope that came with your Zio monitor and mail the device via USPS promptly at the end of wear. FOR ASSISTANCE WITH YOU Bethanie Mkcay CALL CUSTOMER CARE  at [phone]     Patient Instructions:  - Avoid showering for the first 24 hours  - For Zio AT (MCT) Monitor - Keep the black gateway nearby  - Press the button on the center of your [place] when you feel symptoms  - Log the symptom in your log book OR download the free [employer] susi to log your symptoms on your phone  - Wear the [place] for 14 days or as specified you your provider  - Removal instructions are included with your monitor     [place] [place] [address]  at [phone]  - Skin reaction  - Device removed accidentally  - Flashing orange light        Insurance Information:  Your insurance company may send an Explanation of Benefits (EOB). An EOB from your insurance carrier is NOT a bill. You will be responsible for usual out-of-pocket cost associated with deductibles and co-insurances determined by your instance provider. If there is a balance due, you will receive a statement from iRhythm monitor a cash pay option is available and financial assistance programs are available for those who qualify. For insurance coverage, financial assistance, or out-of-pocket estimates, call Zio at [phone]     Prompt Return of the Zio Monitor:  The device and kit components must be returned immediately upon completion of wear using the pre-paid . Delays in return may result in delayed final test results.      FOR ASSISTANCE WITH YOUR Bethanie Mckay CALL CUSTOMER CARE  at [phone]

## 2022-07-20 NOTE — PROGRESS NOTES
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 right handed female presenting for evaluation of TIA. She presented with transient speech difficulties. This came on suddenly and her daughter wanted her to come to the hospital for evaluation. NIHSS on arrival 0. CT head with no acute findings. MRI brain showed no acute findings. She denies having any focal weakness, numbness or other symptoms with this event. She is currently back to baseline. Echo showed a moderately dilated LA. No shunt. CTAs unremarkable. No chest pain or palpitations  No SOB  No vertigo, lightheadedness or loss of consciousness  No falls, tripping or stumbling  No incontinence of bowels or bladder  No itching or bruising appreciated  No numbness, tingling or focal arm/leg weakness    ROS otherwise negative      Allergies: Allergies   Allergen Reactions    Codeine Hives and Itching    Penicillins Hives, Itching and Rash     Tolerated cephalosporin in the past        Physical Examination  Vitals   Vitals:    07/19/22 1517 07/19/22 2000 07/20/22 0215 07/20/22 0810   BP: 130/86 131/70 (!) 153/70 (!) 143/75   Pulse: 63 77 59 63   Resp: 16 16 18 18   Temp: 98.1 °F (36.7 °C) 97.1 °F (36.2 °C) 97.5 °F (36.4 °C) 97 °F (36.1 °C)   TempSrc: Oral Temporal Temporal Temporal   SpO2: 97% 99% 97% 98%   Weight:       Height:            General: Patient appears in no acute distress.  Awake and oriented x 4  HEENT: Normocephalic, atraumatic  Chest: Clear to auscultation bilaterally  Heart: No murmurs appreciated  Extremities/Peripheral vascular: No edema/swelling noted. No cold limbs noted. Neurologic Examination    Mental Status  Alert, and oriented to person, place and time. Speech is fluent with intact comprehension. No evidence of memory impairment. Attention and concentration appeared normal.     Cranial Nerves  II. Visual fields full to confrontation bilaterally. III, IV, VI: Pupils equally round and reactive to light, 3 to 2 mm bilaterally. EOMs: full, no nystagmus. V. Facial sensation intact to light touch bilaterally  VII: Facial movements symmetric and strong  VIII: Hearing intact to voice  IX,X: Palate elevates symmetrically. No dysarthria  XI: Sternocleidomastoid and trapezius 5/5 bilaterally   XII: Tongue is midline    Motor     Right Left   Right Left   Deltoid 5 5  Hip Flexion 5 5   Biceps      5  5  Knee Extension 5 5   Triceps 5 5  Knee Flexion 5 5   Handgrip 5 5  Ankle Dorsiflexion 5 5       Ankle Plantarflexion 5 5     Tone: Normal in all four limbs    Bulk: Normal in all four limbs with no evidence of atrophy    Pronator drift: absent bilaterally    Sensation  Light Touch: Intact distally in all four limbs    Reflexes    No babinski     Coordination  Rapid alternating movements normal in bilateral upper extremities  Finger to nose testing normal bilaterally    Gait  Deferred for safety/fall consideration      Labs  Recent Labs     07/19/22  1038   HDL 73   LDLCALC 86   LABA1C 5.2       Imaging  CTA NECK W CONTRAST   Preliminary Result   Unremarkable CTA of the head and neck. 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 CTA HEAD W CONTRAST   Preliminary Result   Unremarkable CTA of the head and neck. 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                I have personally reviewed the following images: MRI brain     Echo-    Left Atrium   The left atrium is moderately dilated. Agitated saline injected for shunt evaluation. No evidence of patent foramen ovale on bubble study. Normal left ventricle size and systolic function. Ejection fraction is visually estimated at 55-60%. No regional wall motion abnormalities seen. Normal left ventricle wall thickness. Indeterminate diastolic function. No evidence of patent foramen ovale on bubble study. Normal right ventricular size and function. TAPSE 17 mm. No hemodynamically significant aortic stenosis is present. Mild aortic regurgitation is noted. Physiologic and/or trace tricuspid regurgitation. RVSP is 23 mmHg. Normal PA systolic pressure. No evidence for hemodynamically significant pericardial effusion.     Electronically signed by EDUARDO Hull on 7/20/2022 at 11:24 AM

## 2022-07-22 ENCOUNTER — TELEPHONE (OUTPATIENT)
Dept: ADMINISTRATIVE | Age: 87
End: 2022-07-22

## 2022-08-08 DIAGNOSIS — G45.9 TIA (TRANSIENT ISCHEMIC ATTACK): ICD-10-CM

## 2022-08-19 ENCOUNTER — APPOINTMENT (OUTPATIENT)
Dept: CT IMAGING | Age: 87
End: 2022-08-19
Payer: MEDICARE

## 2022-08-19 ENCOUNTER — HOSPITAL ENCOUNTER (EMERGENCY)
Age: 87
Discharge: HOME | End: 2022-08-19
Attending: EMERGENCY MEDICINE
Payer: MEDICARE

## 2022-08-19 VITALS
DIASTOLIC BLOOD PRESSURE: 74 MMHG | RESPIRATION RATE: 22 BRPM | SYSTOLIC BLOOD PRESSURE: 128 MMHG | HEIGHT: 65 IN | HEART RATE: 75 BPM | TEMPERATURE: 98.5 F | OXYGEN SATURATION: 97 % | BODY MASS INDEX: 27.49 KG/M2 | WEIGHT: 165 LBS

## 2022-08-19 DIAGNOSIS — G45.9 TIA (TRANSIENT ISCHEMIC ATTACK): ICD-10-CM

## 2022-08-19 DIAGNOSIS — R47.01 APHASIA: Primary | ICD-10-CM

## 2022-08-19 LAB
ALBUMIN SERPL-MCNC: 4.1 G/DL (ref 3.5–5.2)
ALP BLD-CCNC: 88 U/L (ref 35–104)
ALT SERPL-CCNC: 36 U/L (ref 0–32)
ANION GAP SERPL CALCULATED.3IONS-SCNC: 13 MMOL/L (ref 7–16)
AST SERPL-CCNC: 41 U/L (ref 0–31)
BASOPHILS ABSOLUTE: 0.06 E9/L (ref 0–0.2)
BASOPHILS RELATIVE PERCENT: 0.7 % (ref 0–2)
BILIRUB SERPL-MCNC: 0.8 MG/DL (ref 0–1.2)
BUN BLDV-MCNC: 17 MG/DL (ref 6–23)
CALCIUM SERPL-MCNC: 9.1 MG/DL (ref 8.6–10.2)
CHLORIDE BLD-SCNC: 106 MMOL/L (ref 98–107)
CHP ED QC CHECK: YES
CO2: 24 MMOL/L (ref 22–29)
CREAT SERPL-MCNC: 1.1 MG/DL (ref 0.5–1)
EOSINOPHILS ABSOLUTE: 0.19 E9/L (ref 0.05–0.5)
EOSINOPHILS RELATIVE PERCENT: 2.3 % (ref 0–6)
GFR AFRICAN AMERICAN: 56
GFR NON-AFRICAN AMERICAN: 47 ML/MIN/1.73
GLUCOSE BLD-MCNC: 135 MG/DL
GLUCOSE BLD-MCNC: 137 MG/DL (ref 74–99)
HCT VFR BLD CALC: 36.8 % (ref 34–48)
HEMOGLOBIN: 12.3 G/DL (ref 11.5–15.5)
IMMATURE GRANULOCYTES #: 0.05 E9/L
IMMATURE GRANULOCYTES %: 0.6 % (ref 0–5)
LYMPHOCYTES ABSOLUTE: 1.86 E9/L (ref 1.5–4)
LYMPHOCYTES RELATIVE PERCENT: 22.7 % (ref 20–42)
MCH RBC QN AUTO: 32.8 PG (ref 26–35)
MCHC RBC AUTO-ENTMCNC: 33.4 % (ref 32–34.5)
MCV RBC AUTO: 98.1 FL (ref 80–99.9)
METER GLUCOSE: 135 MG/DL (ref 74–99)
MONOCYTES ABSOLUTE: 0.6 E9/L (ref 0.1–0.95)
MONOCYTES RELATIVE PERCENT: 7.3 % (ref 2–12)
NEUTROPHILS ABSOLUTE: 5.43 E9/L (ref 1.8–7.3)
NEUTROPHILS RELATIVE PERCENT: 66.4 % (ref 43–80)
PDW BLD-RTO: 15.8 FL (ref 11.5–15)
PLATELET # BLD: 143 E9/L (ref 130–450)
PMV BLD AUTO: 10.5 FL (ref 7–12)
POTASSIUM SERPL-SCNC: 4.2 MMOL/L (ref 3.5–5)
RBC # BLD: 3.75 E12/L (ref 3.5–5.5)
SODIUM BLD-SCNC: 143 MMOL/L (ref 132–146)
TOTAL PROTEIN: 6.6 G/DL (ref 6.4–8.3)
TROPONIN, HIGH SENSITIVITY: 18 NG/L (ref 0–9)
WBC # BLD: 8.2 E9/L (ref 4.5–11.5)

## 2022-08-19 PROCEDURE — 99284 EMERGENCY DEPT VISIT MOD MDM: CPT

## 2022-08-19 PROCEDURE — 85025 COMPLETE CBC W/AUTO DIFF WBC: CPT

## 2022-08-19 PROCEDURE — 80053 COMPREHEN METABOLIC PANEL: CPT

## 2022-08-19 PROCEDURE — 82962 GLUCOSE BLOOD TEST: CPT

## 2022-08-19 PROCEDURE — 93005 ELECTROCARDIOGRAM TRACING: CPT | Performed by: PHYSICIAN ASSISTANT

## 2022-08-19 PROCEDURE — 70450 CT HEAD/BRAIN W/O DYE: CPT

## 2022-08-19 PROCEDURE — 84484 ASSAY OF TROPONIN QUANT: CPT

## 2022-08-19 RX ORDER — PREDNISONE 1 MG/1
5 TABLET ORAL DAILY
COMMUNITY

## 2022-08-19 RX ORDER — SULFAMETHOXAZOLE AND TRIMETHOPRIM 800; 160 MG/1; MG/1
1 TABLET ORAL SEE ADMIN INSTRUCTIONS
COMMUNITY

## 2022-08-19 RX ORDER — GREEN TEA/HOODIA GORDONII 315-12.5MG
1 CAPSULE ORAL DAILY
COMMUNITY

## 2022-08-19 RX ORDER — ACETAMINOPHEN 500 MG
1000 TABLET ORAL NIGHTLY
COMMUNITY

## 2022-08-19 RX ORDER — PANTOPRAZOLE SODIUM 40 MG/1
40 TABLET, DELAYED RELEASE ORAL DAILY
COMMUNITY
End: 2022-08-24

## 2022-08-19 ASSESSMENT — ENCOUNTER SYMPTOMS
BLOOD IN STOOL: 0
NAUSEA: 0
SHORTNESS OF BREATH: 0
VOMITING: 0
COUGH: 0
COLOR CHANGE: 0
ABDOMINAL PAIN: 0
BACK PAIN: 0
RHINORRHEA: 0

## 2022-08-19 ASSESSMENT — PAIN - FUNCTIONAL ASSESSMENT
PAIN_FUNCTIONAL_ASSESSMENT: NONE - DENIES PAIN
PAIN_FUNCTIONAL_ASSESSMENT: NONE - DENIES PAIN

## 2022-08-19 NOTE — ED NOTES
Department of Emergency Medicine  FIRST PROVIDER TRIAGE NOTE             Independent MLP           8/19/22  4:33 PM EDT    Date of Encounter: 8/19/22   MRN: [id number]      HPI: Marbella Richards is a 80 y.o. female who presents to the ED for Aphasia (Garbled speech at Dr office ) and Headache     States she had slurred speech at her PCPs office.  told her to go to the ED, but she went to [facility] instead. Symptoms resolved after she left [facility] and had a frosty. She is asymptomatic at this time. ROS: Negative for cp or sob. PE: Gen Appearance/Constitutional: alert     Initial Plan of Care: All treatment areas with department are currently occupied. Plan to order/Initiate the following while awaiting opening in ED: labs, EKG, and imaging studies.   Initiate Treatment-Testing, Proceed toTreatment Area When Bed Available for ED Attending/MLP to Continue Care    Electronically signed by Jamel Fishamn PA-C   DD: 8/19/22         Jamel Fishman PA-C  08/19/22 6754

## 2022-08-19 NOTE — ED PROVIDER NOTES
ED PROVIDER NOTE    Chief Complaint   Patient presents with    Aphasia     Garbled speech at Dr office     Headache       HPI:  8/19/22,   Time: 4:55 PM EDT       Dalia Altman is a 80 y.o. female presenting to the ED for aphasia and headache. Gradual onset 3 days ago, intermittent since onset, moderate in severity, no aggravating/alleviating factors. Has been having word finding difficulty and garbling her words for the past 2-3 days. This occurred a few times during the interview but for the most part has normal speech. Today went to her PCP office where she described garbled speech and left frontal headache. He recommended she go to the ED for imaging to r/o ICH. Went home before coming to ED and took 2 aspirin. Not on anticoagulants. No recent falls or illness. Normal po intake and urine output. 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 Hx of HTN, MVP, PE, ILD, GERD    Review of Systems:     Review of Systems   Constitutional:  Negative for appetite change, chills and fever. HENT:  Negative for congestion and rhinorrhea. Eyes:  Negative for visual disturbance. Respiratory:  Negative for cough and shortness of breath. Cardiovascular:  Negative for chest pain. Gastrointestinal:  Negative for abdominal pain, blood in stool, nausea and vomiting. Genitourinary:  Negative for decreased urine volume and difficulty urinating. Musculoskeletal:  Negative for back pain and neck pain. Skin:  Negative for color change. Neurological:  Positive for speech difficulty.  Negative for dizziness, syncope, weakness, light-headedness, numbness and headaches.       --------------------------------------------- PAST HISTORY ---------------------------------------------  Past Medical History:   Past Medical History:   Diagnosis Date    FABIO (acute kidney injury) ([facility] 6/17/2015    Cervical cancer ([facility] 6/3/2015    1970    Chest pain 6/3/2015    Chronic renal impairment, stage 2 (mild) 2015    Erosive osteoarthritis of multiple sites 6/3/2015    2005    Former smoker, stopped smoking in distant past     Gastritis 6/3/2015    GERD (gastroesophageal reflux disease) 6/3/2015    Glucose intolerance (impaired glucose tolerance) 6/3/2015    2004    HTN (hypertension) 6/3/2015    ILD (interstitial lung disease) ([address] 2015    Microscopic polyangiitis ([address] 2015    MVP (mitral valve prolapse) 6/3/2015    1982    Near syncope 6/3/2015    Orthostatic hypotension 2015    Pseudogout of left wrist 6/3/2015    2005    Pulmonary embolus ([address] 2015    BILATERAL    Pulmonary fibrosis ([address] 2015    Pulmonary nodule 2015    Sjogren's disease ([address] 2015    Volume depletion 2015    Wegener's granulomatosis (granulomatosis with polyangiitis)        Past Surgical History:   Past Surgical History:   Procedure Laterality Date    ANKLE FRACTURE SURGERY Right 2019    ANKLE OPEN REDUCTION INTERNAL FIXATION performed by Bruno Severin, MD at [address] [place] [address]    ECHO COMPL W DOP COLOR FLOW  2015         [facility]    [facility]      TOTAL KNEE ARTHROPLASTY Bilateral     [place]       Social History:   Social History     Socioeconomic History    Marital status:      Spouse name: None    Number of children: 4    Years of education: None    Highest education level: None   Occupational History    Occupation:    Tobacco Use    Smoking status: Former     Packs/day: 0.50     Years: 9.00     Pack years: 4.50     Types: Cigarettes     Start date: 10/23/1948     Quit date: 10/23/1957     Years since quittin.8    Smokeless tobacco: Never   Vaping Use    Vaping Use: Never used   Substance and Sexual Activity    Alcohol use: No     Alcohol/week: 0.0 standard drinks    Drug use: No     Social Determinants of Health     Financial Resource Strain: Low Risk     Difficulty of Paying Living Expenses: Not hard at all   Food Insecurity: No Food Insecurity    Worried About Running Out of Food in the Last Year: Never true    Ran Out of Food in the Last Year: Never true       Family History:   Family History   Problem Relation Age of Onset    Cancer Mother     Stroke Father     Breast Cancer Sister     Other Brother         brain tumor    Breast Cancer Sister        The patients home medications have been reviewed. Allergies: Allergies   Allergen Reactions    Codeine Hives and Itching    Penicillins Hives, Itching and Rash     Tolerated cephalosporin in the past           ---------------------------------------------------PHYSICAL EXAM--------------------------------------    /76   Pulse 78   Temp 98.5 °F (36.9 °C)   Resp 16   Ht 5' 5\" (1.651 m)   Wt 165 lb (74.8 kg)   SpO2 96%   BMI 27.46 kg/m²     Physical Exam  Vitals and nursing note reviewed. Constitutional:       General: She is not in acute distress. Appearance: She is not toxic-appearing. HENT:      Mouth/Throat:      Mouth: Mucous membranes are moist.   Eyes:      General: No scleral icterus. Extraocular Movements: Extraocular movements intact. Pupils: Pupils are equal, round, and reactive to light. Cardiovascular:      Rate and Rhythm: Normal rate and regular rhythm. Pulses: Normal pulses. Heart sounds: Normal heart sounds. No murmur heard. Pulmonary:      Effort: Pulmonary effort is normal. No respiratory distress. Breath sounds: Normal breath sounds. No wheezing or rales. Abdominal:      General: There is no distension. Palpations: Abdomen is soft. Tenderness: There is no abdominal tenderness. Musculoskeletal:         General: No swelling or tenderness. Normal range of motion. Cervical back: Normal range of motion and neck supple. Comments: Radial, DP, and PT pulses 2+ bilaterally. Skin:     General: Skin is warm and dry.    Neurological:      Mental Status: She is alert and oriented to person, place, and time. Comments: PERRL, EOMI, FS, TM, facial sensation intact to light touch bilaterally, shoulder shrug intact bilaterally, Strength 5/5 and sensation grossly intact to light touch and equal bilaterally throughout all extremities, no dysmetria, no ataxia, normal gait. Intermittent aphasia and dysarthria lasting a couple seconds at a time and self resolves             -------------------------------------------------- RESULTS -------------------------------------------------  I have personally reviewed all laboratory and imaging results for this patient. Results are listed below. LABS:  Labs Reviewed   CBC WITH AUTO DIFFERENTIAL - Abnormal; Notable for the following components:       Result Value    RDW 15.8 (*)     All other components within normal limits   COMPREHENSIVE METABOLIC PANEL - Abnormal; Notable for the following components:    Glucose 137 (*)     Creatinine 1.1 (*)     ALT 36 (*)     AST 41 (*)     All other components within normal limits   TROPONIN - Abnormal; Notable for the following components:    Troponin, High Sensitivity 18 (*)     All other components within normal limits   POCT GLUCOSE - Abnormal; Notable for the following components:    Meter Glucose 135 (*)     All other components within normal limits   POCT GLUCOSE - Normal       RADIOLOGY:  Interpreted personally and by Radiologist.  CT HEAD WO CONTRAST   Final Result   No acute intracranial abnormality. EKG:  This EKG is signed and interpreted by the EP. Normal sinus rhythm, vent rate 73bpm, normal axis and intervals, no acute injury pattern, poor R wave progression, otherwise no clinically significant change compared w/ prior EKG       ------------------------- NURSING NOTES AND VITALS REVIEWED ---------------------------   The nursing notes within the ED encounter and vital signs as below have been reviewed by myself.   /76   Pulse 78   Temp 98.5 °F (36.9 °C)   Resp 16   Ht 5' 5\"

## 2022-08-21 LAB
EKG ATRIAL RATE: 73 BPM
EKG P AXIS: -9 DEGREES
EKG P-R INTERVAL: 182 MS
EKG Q-T INTERVAL: 372 MS
EKG QRS DURATION: 84 MS
EKG QTC CALCULATION (BAZETT): 409 MS
EKG R AXIS: -23 DEGREES
EKG T AXIS: -12 DEGREES
EKG VENTRICULAR RATE: 73 BPM

## 2022-08-22 ENCOUNTER — TELEPHONE (OUTPATIENT)
Dept: ADMINISTRATIVE | Age: 87
End: 2022-08-22

## 2022-08-22 NOTE — TELEPHONE ENCOUNTER
Pt's daughter Ifeoma Bryan called, pt was in the hospital back in July, and now again this weekend. Referral to [facility] but prefers she only see a physician. DX:  TIA    Ifeoma Bryan insists on an appt sooner than the first available w/Dr [facility] on 9/20. Please call her back asap to schedule.   Thank you

## 2022-08-23 NOTE — DISCHARGE SUMMARY
Physician Discharge Summary     Patient ID:  Bianca Amador  [id number]  89 y.o.  3/3/1932    Admit date: 7/17/2022    Discharge date and time: 7/20/2022  3:10 PM     Admission Diagnoses: Principal Problem:    TIA (transient ischemic attack)  Resolved Problems:    * No resolved hospital problems. *      Discharge Diagnoses: Principal Problem:    TIA (transient ischemic attack)  Resolved Problems:    * No resolved hospital problems. *      Condition at discharge : Stable    Consults: IP CONSULT TO NEUROLOGY    Procedures: None    Hospital Course: Patient is a 22-year-old lady sent to the emergency room at [facility] because of aphasia and altered mental status. Patient apparently had slurred speech. She was noted to be confused. She was brought to the emergency room. By the time she presented to the emergency room her symptoms resolved. She was admitted for further evaluation and treatment. Patient was then transferred to [facility] for neurology evaluation. Neurology consulted. MRI was negative for acute stroke. Echo results were benign. She also had CTA of the neck as well as head. Patient was felt to have a TIA. She was then discharged with outpatient follow-up. Zio patch was also applied before discharge. CTA NECK W CONTRAST   Final Result   Unremarkable CTA of the head and neck. 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 CTA HEAD W CONTRAST   Final Result   Unremarkable CTA of the head and neck. 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 Results for orders placed or performed during the hospital encounter of 08/19/22 (from the past 336 hour(s))   POCT Glucose    Collection Time: 08/19/22  4:59 PM   Result Value Ref Range    Glucose 135 mg/dL    QC OK?  yes    POCT Glucose    Collection Time: 08/19/22  4:59 PM   Result Value Ref Range    Meter Glucose 135 (H) 74 - 99 mg/dL   CBC with Auto Differential    Collection Time: 08/19/22  5:01 PM   Result Value Ref Range    WBC 8.2 4.5 - 11.5 E9/L    RBC 3.75 3.50 - 5.50 E12/L    Hemoglobin 12.3 11.5 - 15.5 g/dL    Hematocrit 36.8 34.0 - 48.0 %    MCV 98.1 80.0 - 99.9 fL    MCH 32.8 26.0 - 35.0 pg    MCHC 33.4 32.0 - 34.5 %    RDW 15.8 (H) 11.5 - 15.0 fL    Platelets 998 143 - 286 E9/L    MPV 10.5 7.0 - 12.0 fL    Neutrophils % 66.4 43.0 - 80.0 %    Immature Granulocytes % 0.6 0.0 - 5.0 %    Lymphocytes % 22.7 20.0 - 42.0 %    Monocytes % 7.3 2.0 - 12.0 %    Eosinophils % 2.3 0.0 - 6.0 %    Basophils % 0.7 0.0 - 2.0 %    Neutrophils Absolute 5.43 1.80 - 7.30 E9/L    Immature Granulocytes # 0.05 E9/L    Lymphocytes Absolute 1.86 1.50 - 4.00 E9/L    Monocytes Absolute 0.60 0.10 - 0.95 E9/L    Eosinophils Absolute 0.19 0.05 - 0.50 E9/L    Basophils Absolute 0.06 0.00 - 0.20 E9/L   Comprehensive Metabolic Panel    Collection Time: 08/19/22  5:01 PM   Result Value Ref Range    Sodium 143 132 - 146 mmol/L    Potassium 4.2 3.5 - 5.0 mmol/L    Chloride 106 98 - 107 mmol/L    CO2 24 22 - 29 mmol/L    Anion Gap 13 7 - 16 mmol/L    Glucose 137 (H) 74 - 99 mg/dL    BUN 17 6 - 23 mg/dL    Creatinine 1.1 (H) 0.5 - 1.0 mg/dL    GFR Non-African American 47 >=60 mL/min/1.73    GFR African American 56     Calcium 9.1 8.6 - 10.2 mg/dL    Total Protein 6.6 6.4 - 8.3 g/dL    Albumin 4.1 3.5 - 5.2 g/dL    Total Bilirubin 0.8 0.0 - 1.2 mg/dL    Alkaline Phosphatase 88 35 - 104 U/L    ALT 36 (H) 0 - 32 U/L    AST 41 (H) 0 - 31 U/L   Troponin    Collection Time: 08/19/22  5:01 PM   Result Value Ref Range    Troponin, High Sensitivity 18 (H) 0 - 9 ng/L   EKG 12 Lead    Collection Time: 08/19/22  5:12 PM   Result Value Ref Range    Ventricular Rate 73 BPM    Atrial Rate 73 BPM    P-R Interval 182 ms    QRS Duration 84 ms    Q-T Interval 372 ms    QTc Calculation (Bazett) 409 ms    P Axis -9 degrees    R Axis -23 degrees    T Axis -12 degrees         Discharge Exam:  See progress note from today    Disposition: Home    Patient Instructions:   Discharge Medication List as of 7/20/2022 12:36 PM        START taking these medications    Details   aspirin 81 MG chewable tablet Take 1 tablet by mouth in the morning., Disp-30 tablet, R-3Normal      atorvastatin (LIPITOR) 40 MG tablet Take 1 tablet by mouth nightly, Disp-30 tablet, R-3Normal           CONTINUE these medications which have NOT CHANGED    Details   citalopram (CELEXA) 20 MG tablet Take 20 mg by mouth in the morning. Historical Med      pantoprazole (PROTONIX) 40 MG tablet Take 1 tablet by mouth daily, Disp-90 tablet, R-1Normal      calcium carbonate (OSCAL) 500 MG TABS tablet Take 1,000 mg by mouth dailyHistorical Med      Vitamin D (CHOLECALCIFEROL) 25 MCG (1000 UT) TABS tablet Take 1,000 Units by mouth dailyLabeling may look different. 25 mcg=1000 Units. Please double check dosages. Historical Med      predniSONE (DELTASONE) 5 MG tablet TAKE 1 TABLET BY MOUTH EVERY DAY, Disp-90 tablet,R-3Normal      Multiple Vitamins-Minerals (MULTI VITAMIN/MINERALS) TABS Take 1 tablet by mouth dailyHistorical Med             Activity: As tolerated    Diet: As tolerated    Follow-up with Yoshi Bhatt [address]  [address] [facility]  [phone]    Follow up in 2 week(s)  patient prefers [facility]    Terri Mishra MD  [address] [phone]    Schedule an appointment as soon as possible for a visit          Note that over 30 minutes was spent in preparing discharge papers, discussing discharge with patient, medication review, etc.    Signed:  Terri Mishra MD  8/23/2022  12:48 PM

## 2022-09-07 ENCOUNTER — OFFICE VISIT (OUTPATIENT)
Dept: NEUROLOGY | Age: 87
End: 2022-09-07
Payer: MEDICARE

## 2022-09-07 VITALS
BODY MASS INDEX: 28.82 KG/M2 | TEMPERATURE: 97.5 F | WEIGHT: 173 LBS | DIASTOLIC BLOOD PRESSURE: 72 MMHG | HEART RATE: 77 BPM | RESPIRATION RATE: 18 BRPM | SYSTOLIC BLOOD PRESSURE: 116 MMHG | HEIGHT: 65 IN | OXYGEN SATURATION: 95 %

## 2022-09-07 DIAGNOSIS — G30.1 LATE ONSET ALZHEIMER'S DEMENTIA WITHOUT BEHAVIORAL DISTURBANCE (HCC): ICD-10-CM

## 2022-09-07 DIAGNOSIS — R41.841 COGNITIVE COMMUNICATION DEFICIT: Primary | ICD-10-CM

## 2022-09-07 DIAGNOSIS — F02.80 LATE ONSET ALZHEIMER'S DEMENTIA WITHOUT BEHAVIORAL DISTURBANCE (HCC): ICD-10-CM

## 2022-09-07 PROCEDURE — 99205 OFFICE O/P NEW HI 60 MIN: CPT | Performed by: PSYCHIATRY & NEUROLOGY

## 2022-09-07 NOTE — PROGRESS NOTES
This 80-year-old right-handed woman was referred for additional evaluation and management of recent transient ischemic attacks. She was a fair historian; her daughter provided an excellent history. Her medications were pantoprazole, prednisone, sulfamethoxazole/trimethoprim, aspirin, atorvastatin, citalopram, cholecalciferol and multivitamins. Her medical history was complicated, including Wegener's granulomatosis, successfully treated with rituximab, which she had not received for 2 years. That disorder presented as flulike illness, producing moderate renal and pulmonary damage. Additional disorders included gastroesophageal reflux disease, pseudogout, cervical cancer, hypertension and glucose intolerance. There were no seizures, known strokes, other cancers, other connective tissue disease, skin abnormalities or depression. Surgeries included right ankle fracture repair, kidney biopsy, tonsillectomy and bilateral total knee arthroplasties. She reported hallucinations with codeine and morphine sulfate, but no other reactions to medications. She suffered from minimal closed head trauma and fractures from past falls. She was a native of the [facility]. She is  with 4 healthy children, 2 healthy grandchildren and 2 healthy great-grandchildren. There were 2 sisters, 1 suffering from breast cancer. A brother presumably  of glioblastoma multiforme A. Her mother  of metastatic uterine cancer in her 46s. Her father succumbed to elderly infirmities in his [de-identified]. She was of [place] and [place] ancestry. She stopped smoking many years ago. She drank only socially. She never abused street drugs. She was a former beautician. She received her COVID-19 vaccinations and boosters. Review of systems was otherwise unremarkable, except as noted below. Her neurological problems began 6 weeks ago and then 2 weeks ago.   These events were similar, consisting of the sudden onset of confusion with difficulties obtaining words. Her words were also mixed; her daughter cited the example of bread versus bed. She understood others; there was no slurring of speech. There was possible gait difficulties with the second event, but no other deficits. Her daughter reported no seizure-like activity or loss of consciousness with the spells. The family and patient denied headaches, other pains, spinning sensations, hearing loss, incontinence, weakness or other clumsiness. There were no other medical issues. Physical examination displayed stable vital signs. Blood pressure was 116/72. She was an elderly woman in no acute distress, who appears much younger than her stated age. She was alert, cooperative and oriented. She was very pleasant. Her skin was unremarkable, with no lymphadenopathy. Head examination was unremarkable. Her neck was supple without thyromegaly; there were +1 carotid upstrokes without bruits; there was slightly limited range of motion of her neck in all directions. Her spine displayed only minimal gibbus deformity, without tenderness. Her lungs were clear to auscultation. Cardiac testing proved unrevealing. Her abdomen was soft, nontender and with active bowel sounds. Peripheral pulses were +1 without bruits. Her limbs displayed bilateral pes planus deformities and arthritic ankles and knees, without cyanosis or edema. Neurological examination produced an intact mental status, except for minimal long and short-term memory deficits. She also displayed no responses to some of my questions and slight confusion when falling complex commands. On occasion, she was suspicious of me and her daughter. I found no dysarthria or aphasia. Cranial nerve testing was unrevealing. I found normal tone and bulk, with 5/5 strength throughout. There were no abnormal movements. Reflexes were barely elicited throughout, without pathological ones.   Light touch and cobalamin levels were ordered. Pending those results, donepezil will be added. She will also increase her fluid and food intake. Her daughter will report back after the above testings were completed. Her daughter will call anytime if problems arise. I spent 80 minutes with the patient with over 50 % spent in counseling and disease management. All patient issues were addressed and all questions were answered.

## 2022-09-08 ENCOUNTER — HOSPITAL ENCOUNTER (OUTPATIENT)
Age: 87
Discharge: HOME | End: 2022-09-08
Payer: MEDICARE

## 2022-09-08 DIAGNOSIS — F02.80 LATE ONSET ALZHEIMER'S DEMENTIA WITHOUT BEHAVIORAL DISTURBANCE (HCC): ICD-10-CM

## 2022-09-08 DIAGNOSIS — G30.1 LATE ONSET ALZHEIMER'S DEMENTIA WITHOUT BEHAVIORAL DISTURBANCE (HCC): ICD-10-CM

## 2022-09-08 LAB
FOLATE: >20 NG/ML (ref 4.8–24.2)
VITAMIN B-12: 955 PG/ML (ref 211–946)

## 2022-09-08 PROCEDURE — 82652 VIT D 1 25-DIHYDROXY: CPT

## 2022-09-08 PROCEDURE — 82607 VITAMIN B-12: CPT

## 2022-09-08 PROCEDURE — 82746 ASSAY OF FOLIC ACID SERUM: CPT

## 2022-09-08 PROCEDURE — 36415 COLL VENOUS BLD VENIPUNCTURE: CPT

## 2022-09-11 LAB — VITAMIN D 1,25-DIHYDROXY: 47.4 PG/ML (ref 19.9–79.3)

## 2022-09-30 DIAGNOSIS — R79.9 ABNORMAL BLOOD CHEMISTRY: ICD-10-CM

## 2022-09-30 LAB
ALBUMIN SERPL-MCNC: 3.7 G/DL (ref 3.5–5.2)
ALP BLD-CCNC: 108 U/L (ref 35–104)
ALT SERPL-CCNC: 39 U/L (ref 0–32)
ANION GAP SERPL CALCULATED.3IONS-SCNC: 10 MMOL/L (ref 7–16)
AST SERPL-CCNC: 43 U/L (ref 0–31)
BILIRUB SERPL-MCNC: 0.9 MG/DL (ref 0–1.2)
BUN BLDV-MCNC: 16 MG/DL (ref 6–23)
CALCIUM SERPL-MCNC: 9.4 MG/DL (ref 8.6–10.2)
CHLORIDE BLD-SCNC: 102 MMOL/L (ref 98–107)
CO2: 26 MMOL/L (ref 22–29)
CREAT SERPL-MCNC: 1.1 MG/DL (ref 0.5–1)
GFR AFRICAN AMERICAN: 56
GFR NON-AFRICAN AMERICAN: 47 ML/MIN/1.73
GLUCOSE BLD-MCNC: 129 MG/DL (ref 74–99)
POTASSIUM SERPL-SCNC: 4.2 MMOL/L (ref 3.5–5)
SODIUM BLD-SCNC: 138 MMOL/L (ref 132–146)
TOTAL PROTEIN: 6.5 G/DL (ref 6.4–8.3)

## 2022-10-04 DIAGNOSIS — R79.9 ABNORMAL BLOOD CHEMISTRY: ICD-10-CM

## 2022-10-04 LAB
ALBUMIN SERPL-MCNC: 3.8 G/DL (ref 3.5–5.2)
ALP BLD-CCNC: 115 U/L (ref 35–104)
ALT SERPL-CCNC: 49 U/L (ref 0–32)
ANION GAP SERPL CALCULATED.3IONS-SCNC: 10 MMOL/L (ref 7–16)
AST SERPL-CCNC: 40 U/L (ref 0–31)
BILIRUB SERPL-MCNC: 0.8 MG/DL (ref 0–1.2)
BUN BLDV-MCNC: 21 MG/DL (ref 6–23)
CALCIUM SERPL-MCNC: 9.5 MG/DL (ref 8.6–10.2)
CHLORIDE BLD-SCNC: 103 MMOL/L (ref 98–107)
CO2: 26 MMOL/L (ref 22–29)
CREAT SERPL-MCNC: 1.2 MG/DL (ref 0.5–1)
GFR AFRICAN AMERICAN: 51
GFR NON-AFRICAN AMERICAN: 42 ML/MIN/1.73
GLUCOSE BLD-MCNC: 122 MG/DL (ref 74–99)
POTASSIUM SERPL-SCNC: 4.1 MMOL/L (ref 3.5–5)
SODIUM BLD-SCNC: 139 MMOL/L (ref 132–146)
TOTAL PROTEIN: 6.7 G/DL (ref 6.4–8.3)

## 2022-10-07 ENCOUNTER — HOSPITAL ENCOUNTER (OUTPATIENT)
Dept: SPEECH THERAPY | Age: 87
Setting detail: THERAPIES SERIES
Discharge: HOME | End: 2022-10-07
Payer: MEDICARE

## 2022-10-07 PROCEDURE — 96125 COGNITIVE TEST BY HC PRO: CPT

## 2022-10-07 NOTE — PROGRESS NOTES
[address] [facility]  Outpatient Speech Therapy  Phone: [phone] Fax: [phone]     SPEECH/LANGUAGE PATHOLOGY  ROSS INFORMATION PROCESSING ASSESSMENT-2 (RIPA-2)   EVALUATION RESULTS and PLAN OF CARE    PATIENT NAME:  Wanda Walters  (female)     MRN:  [id number]    :  3/3/1932  (80 y.o.)  STATUS:  Outpatient clinic   TODAY'S DATE:  10/7/2022  REFERRING PROVIDER:    Dr. Doss Eye       PROVIDER NPI:  [id number]  SPECIFIC PROVIDER ORDER: SLP cognitive language evaluation  Date of order:  22  EVALUATING THERAPIST: KATI Sanabria    CERTIFICATION/RECERTIFICATION PERIOD: 10/7/2022 to 22  INSURANCE PROVIDER:  Payor: Alondra Castillo / Plan: [employer] / Product Type: *No Product type* /    INSURANCE ID:  [id number] - (Medicare Managed)   SECONDARY INSURANCE (if applicable):        CPT Codes  EVALUATION: 49495  standardized cognitive performance testing (per hour)    TREATMENT:  Requesting treatment authorization for  1 visits over 12 weeks focusing on the following CPT codes:      90676  therapeutic interventions that focus on cognitive function , initial  15 min  39302  therapeutic interventions that focus on cognitive function, each additional 15 min  54049  speech/language tx     REFERRING/TREATMENT DIAGNOSIS: Cognitive communication deficit [R41.841]          SPEECH THERAPY  PLAN OF CARE   The speech therapy  POC is established based on physician order, speech pathology diagnosis and results of clinical assessment     SPEECH PATHOLOGY DIAGNOSIS:    Moderate-severe cognitive-linguistic deficits    Outpatient Speech Pathology intervention is recommended 1 time  per week for the above certification period.     Conditions Requiring Skilled Therapeutic Intervention for speech, language and/or cognition    Cognitive linguistic impairment  Decreased short term memory  Decreased problem solving skills   Decreased thought organization    Specific Speech Therapy Interventions to Include: Therapeutic tasks for Cognition    Specific instructions for next treatment: To initiate POC    SHORT/LONG TERM GOALS  ST Goal 1: Patient will complete delayed recall tasks for 3 functional stimuli post 5 minute delay, independently, with 80% accuracy using memory strategies as trained to increase functional short-term memory skills and promote independence. ST Goal 2:  Patient will respond verbally to functional problem solving/reasoning scenarios w/ 90% accuracy, independently to promote safety and independence in home environment. ST Goal 3: Patient will utilize external aids as trained and implemented in treatment (I.e. calendar, to-do list, setting reminders) independently w/ 90% acc to enhance functional cognitive-linguistic skills. ST Goal 4: Patient will answer paragraph retention questions immediately with 80% accuracy to improve functional immediate recall skills. ST Goal 5: Patient will state and demonstrate use of compensatory cognitive-linguistic strategies (I.e. recall strategies) as trained w/ 90% accuracy, independently to enhance functional cognitive-linguistic skills and promote independence. LT Goal 1:  Patient will receive a score of 25 or greater in immediate memory, temporal orientation (recent/remote),  and organization subtest on repeated post-treatment RIPA-2, an improvement of at least 2-7 points from  initial scores of  18/30, 20/30, 23/30, and 21/30 at pretreatment assessment to demonstrate improved cognitive skills. LT Goal 2: Patient will receive a score of 28 or greater in the areas of  recent memory and problem solving/reasoning subtest on repeated post-treatment RIPA-2, an improvement of 2-3 points from initial score of 26/30 and  25/30 to demonstrate improved cognitive skills.       Patient goals: Patient/family involved in developing goals and treatment plan:   Treatment goals discussed with Patient and Family    The Patient and Family understand(s) the diagnosis, prognosis and plan of care   The patient/family Agreed with above,     This plan may be re-evaluated and revised as warranted. Rehabilitation Potential/Prognosis: good                           Stimulus questions were obtained from the RIPA-2. There are 30 possible points for each subtest.   Severity ratings for each subtest were determined as follows:     RAW SCORE  SEVERITY    28-30  Within functional limits [facility])   25-27  Mild deficit    22-24  Moderate deficit    19-21  Marked deficit    16-18 Severe deficit   Below 16 Profound deficit         Subtest  Raw Score /Severity    Immediate Memory  18/30- Severe deficit   Recent Memory  26/30- Mild deficit   Temporal Orientation   (Recent Memory)  20/30- Marked deficit   Temporal Orientation   (Remote Memory)  23/30- Moderate deficit   Spatial Orientation  30/30- WFL   Orientation to Environment  28/30- WFL   Recall of General Information  20/30- Marked deficit   Problem Solving & Abstract Reasoning  25/30- Mild deficit   Organization  21/30- Marked deficit   Auditory Processing   & Retention  28/30- WFL     VARIANT OBSERVATIONS PRESENT DURING THE EVALATION:   Errors  Partially correct  Self corrected    Patient was alert and cooperative throughout evaluation. Patient demonstrates moderate-severe cognitive-linguistic deficits assoc w/ immediate memory, temporal orientation (recent/remote memory), recall of general information, and organization. Patient exhibited mild deficits w/ recent memory and problem solving/reasoning tasks. Patient states she is able to complete all ADL tasks (bathing, dressing, grooming) independently and completes some IADL tasks (cooking) independently. Patient receives assistance from her daughters or spouse w/ medication management and appointment scheduling. Patient and family report the most difficulty w/ organizing/planning and short-term memory.  Patient would benefit from continued SLP services to increase overall cognitive-linguistic abilities w/ use of compensatory strategies and environmental modifications, as needed, in order to increase independence and reach best ability to function within current environment. EDUCATION:   The Speech Language Pathologist (SLP) completed education regarding results of evaluation and that intervention is warranted at this time. Learner: Patient and Family  Education: Reviewed results and recommendations of this evaluation  Evaluation of Education:  Michelle Caballero understanding    This plan may be re-evaluated and revised as warranted. Treatment goals discussed with Patient and Family   The Patient and Family understand(s) the diagnosis, prognosis and plan of care     Evaluation Time includes thorough review of current medical information, gathering information on past medical history/social history and prior level of function, completion of standardized testing/informal observation of tasks, assessment of data and education on plan of care and goals. The admitting diagnosis and active problem list, as listed below have been reviewed prior to initiation of this evaluation. ACTIVE PROBLEM LIST:   Patient Active Problem List   Diagnosis    MVP (mitral valve prolapse)    Cervical cancer (HCC)    GERD (gastroesophageal reflux disease)    Sjogren's disease (HCC)    ILD (interstitial lung disease) ([address]    Granulomatosis with polyangiitis ([address]    History of pulmonary embolus (PE)    Essential hypertension    Stage 3a chronic kidney disease ([address]    Major depressive disorder with single episode, in partial remission ([address]    TIA (transient ischemic attack)       Katalina Sierra M.S., CCC-SLP  Speech-Language Pathologist  [facility] [address]     Phone: [phone]     If you have any questions or concerns, please don't hesitate to call.   Thank you for your referral.    Physician/Provider Signature:________________________________Date:__________________  By signing above, the therapists plan is approved by the physician/provider. All patients under [employer] must be signed by physician/provider.

## 2022-10-22 ENCOUNTER — APPOINTMENT (OUTPATIENT)
Dept: CT IMAGING | Age: 87
End: 2022-10-22
Payer: MEDICARE

## 2022-10-22 ENCOUNTER — HOSPITAL ENCOUNTER (EMERGENCY)
Age: 87
Discharge: HOME | End: 2022-10-23
Attending: EMERGENCY MEDICINE
Payer: MEDICARE

## 2022-10-22 VITALS
RESPIRATION RATE: 16 BRPM | DIASTOLIC BLOOD PRESSURE: 82 MMHG | TEMPERATURE: 97.8 F | OXYGEN SATURATION: 98 % | HEART RATE: 88 BPM | SYSTOLIC BLOOD PRESSURE: 133 MMHG

## 2022-10-22 DIAGNOSIS — K59.00 CONSTIPATION, UNSPECIFIED CONSTIPATION TYPE: ICD-10-CM

## 2022-10-22 DIAGNOSIS — S81.811A NONINFECTED SKIN TEAR OF RIGHT LOWER EXTREMITY, INITIAL ENCOUNTER: ICD-10-CM

## 2022-10-22 DIAGNOSIS — N30.90 CYSTITIS WITHOUT HEMATURIA: ICD-10-CM

## 2022-10-22 DIAGNOSIS — K56.41 FECAL IMPACTION IN RECTUM (HCC): Primary | ICD-10-CM

## 2022-10-22 LAB
ALBUMIN SERPL-MCNC: 3.7 G/DL (ref 3.5–5.2)
ALP BLD-CCNC: 91 U/L (ref 35–104)
ALT SERPL-CCNC: 37 U/L (ref 0–32)
ANION GAP SERPL CALCULATED.3IONS-SCNC: 8 MMOL/L (ref 7–16)
AST SERPL-CCNC: 43 U/L (ref 0–31)
BACTERIA: ABNORMAL /HPF
BASOPHILS ABSOLUTE: 0.07 E9/L (ref 0–0.2)
BASOPHILS RELATIVE PERCENT: 0.9 % (ref 0–2)
BILIRUB SERPL-MCNC: 1 MG/DL (ref 0–1.2)
BILIRUBIN URINE: NEGATIVE
BLOOD, URINE: NEGATIVE
BUN BLDV-MCNC: 20 MG/DL (ref 6–23)
CALCIUM SERPL-MCNC: 9.1 MG/DL (ref 8.6–10.2)
CHLORIDE BLD-SCNC: 104 MMOL/L (ref 98–107)
CLARITY: CLEAR
CO2: 28 MMOL/L (ref 22–29)
COLOR: YELLOW
CREAT SERPL-MCNC: 1.1 MG/DL (ref 0.5–1)
EOSINOPHILS ABSOLUTE: 0.23 E9/L (ref 0.05–0.5)
EOSINOPHILS RELATIVE PERCENT: 2.9 % (ref 0–6)
EPITHELIAL CELLS, UA: ABNORMAL /HPF
GFR SERPL CREATININE-BSD FRML MDRD: 48 ML/MIN/1.73
GLUCOSE BLD-MCNC: 91 MG/DL (ref 74–99)
GLUCOSE URINE: NEGATIVE MG/DL
HCT VFR BLD CALC: 36.2 % (ref 34–48)
HEMOGLOBIN: 12.1 G/DL (ref 11.5–15.5)
IMMATURE GRANULOCYTES #: 0.03 E9/L
IMMATURE GRANULOCYTES %: 0.4 % (ref 0–5)
KETONES, URINE: ABNORMAL MG/DL
LACTIC ACID: 0.9 MMOL/L (ref 0.5–2.2)
LEUKOCYTE ESTERASE, URINE: ABNORMAL
LIPASE: 31 U/L (ref 13–60)
LYMPHOCYTES ABSOLUTE: 2.68 E9/L (ref 1.5–4)
LYMPHOCYTES RELATIVE PERCENT: 33.6 % (ref 20–42)
MCH RBC QN AUTO: 32.1 PG (ref 26–35)
MCHC RBC AUTO-ENTMCNC: 33.4 % (ref 32–34.5)
MCV RBC AUTO: 96 FL (ref 80–99.9)
MONOCYTES ABSOLUTE: 0.81 E9/L (ref 0.1–0.95)
MONOCYTES RELATIVE PERCENT: 10.2 % (ref 2–12)
NEUTROPHILS ABSOLUTE: 4.15 E9/L (ref 1.8–7.3)
NEUTROPHILS RELATIVE PERCENT: 52 % (ref 43–80)
NITRITE, URINE: NEGATIVE
PDW BLD-RTO: 15.3 FL (ref 11.5–15)
PH UA: 6 (ref 5–9)
PLATELET # BLD: 154 E9/L (ref 130–450)
PMV BLD AUTO: 10 FL (ref 7–12)
POTASSIUM SERPL-SCNC: 4 MMOL/L (ref 3.5–5)
PROTEIN UA: NEGATIVE MG/DL
RBC # BLD: 3.77 E12/L (ref 3.5–5.5)
RBC UA: ABNORMAL /HPF (ref 0–2)
SODIUM BLD-SCNC: 140 MMOL/L (ref 132–146)
SPECIFIC GRAVITY UA: 1.02 (ref 1–1.03)
TOTAL PROTEIN: 6.5 G/DL (ref 6.4–8.3)
UROBILINOGEN, URINE: 0.2 E.U./DL
WBC # BLD: 8 E9/L (ref 4.5–11.5)
WBC UA: ABNORMAL /HPF (ref 0–5)

## 2022-10-22 PROCEDURE — 83605 ASSAY OF LACTIC ACID: CPT

## 2022-10-22 PROCEDURE — 85025 COMPLETE CBC W/AUTO DIFF WBC: CPT

## 2022-10-22 PROCEDURE — 96374 THER/PROPH/DIAG INJ IV PUSH: CPT

## 2022-10-22 PROCEDURE — 99284 EMERGENCY DEPT VISIT MOD MDM: CPT

## 2022-10-22 PROCEDURE — 6360000002 HC RX W HCPCS: Performed by: EMERGENCY MEDICINE

## 2022-10-22 PROCEDURE — 80053 COMPREHEN METABOLIC PANEL: CPT

## 2022-10-22 PROCEDURE — 96375 TX/PRO/DX INJ NEW DRUG ADDON: CPT

## 2022-10-22 PROCEDURE — 81001 URINALYSIS AUTO W/SCOPE: CPT

## 2022-10-22 PROCEDURE — 74176 CT ABD & PELVIS W/O CONTRAST: CPT

## 2022-10-22 PROCEDURE — 83690 ASSAY OF LIPASE: CPT

## 2022-10-22 PROCEDURE — 36415 COLL VENOUS BLD VENIPUNCTURE: CPT

## 2022-10-22 PROCEDURE — 2580000003 HC RX 258: Performed by: EMERGENCY MEDICINE

## 2022-10-22 RX ORDER — 0.9 % SODIUM CHLORIDE 0.9 %
1000 INTRAVENOUS SOLUTION INTRAVENOUS ONCE
Status: COMPLETED | OUTPATIENT
Start: 2022-10-22 | End: 2022-10-22

## 2022-10-22 RX ORDER — FENTANYL CITRATE 50 UG/ML
50 INJECTION, SOLUTION INTRAMUSCULAR; INTRAVENOUS ONCE
Status: COMPLETED | OUTPATIENT
Start: 2022-10-22 | End: 2022-10-22

## 2022-10-22 RX ORDER — TETANUS AND DIPHTHERIA TOXOIDS ADSORBED 2; 2 [LF]/.5ML; [LF]/.5ML
0.5 INJECTION INTRAMUSCULAR ONCE
Status: COMPLETED | OUTPATIENT
Start: 2022-10-22 | End: 2022-10-23

## 2022-10-22 RX ORDER — ONDANSETRON 2 MG/ML
4 INJECTION INTRAMUSCULAR; INTRAVENOUS ONCE
Status: COMPLETED | OUTPATIENT
Start: 2022-10-22 | End: 2022-10-22

## 2022-10-22 RX ADMIN — SODIUM CHLORIDE 1000 ML: 9 INJECTION, SOLUTION INTRAVENOUS at 20:06

## 2022-10-22 RX ADMIN — CEFTRIAXONE 1000 MG: 1 INJECTION, POWDER, FOR SOLUTION INTRAMUSCULAR; INTRAVENOUS at 22:16

## 2022-10-22 RX ADMIN — FENTANYL CITRATE 50 MCG: 50 INJECTION, SOLUTION INTRAMUSCULAR; INTRAVENOUS at 20:09

## 2022-10-22 RX ADMIN — ONDANSETRON 4 MG: 2 INJECTION INTRAMUSCULAR; INTRAVENOUS at 20:07

## 2022-10-22 ASSESSMENT — PAIN DESCRIPTION - LOCATION
LOCATION: RECTUM
LOCATION: RECTUM

## 2022-10-22 ASSESSMENT — PAIN - FUNCTIONAL ASSESSMENT
PAIN_FUNCTIONAL_ASSESSMENT: NONE - DENIES PAIN
PAIN_FUNCTIONAL_ASSESSMENT: 0-10

## 2022-10-22 ASSESSMENT — PAIN SCALES - GENERAL
PAINLEVEL_OUTOF10: 10
PAINLEVEL_OUTOF10: 10

## 2022-10-22 ASSESSMENT — PAIN DESCRIPTION - FREQUENCY: FREQUENCY: CONTINUOUS

## 2022-10-22 ASSESSMENT — PAIN DESCRIPTION - DESCRIPTORS
DESCRIPTORS: ACHING
DESCRIPTORS: ACHING

## 2022-10-22 ASSESSMENT — PAIN DESCRIPTION - ONSET: ONSET: SUDDEN

## 2022-10-22 ASSESSMENT — PAIN DESCRIPTION - PAIN TYPE: TYPE: ACUTE PAIN

## 2022-10-22 NOTE — ED PROVIDER NOTES
HPI:  10/22/22, Time: 7:42 PM EDT        Claudio Whelan is a 80 y.o. female presenting to the ED for gradual onset of severe constipation with rectal pain and left lower quadrant abdominal pain, beginning 2-3 days ago. The complaint has been persistent, severe in severity, and worsened by nothing. Patient states that she has tried to manually disimpact herself because of large amount of stool in the rectal area but she was unsuccessful. Apart from this the patient states that she hit her left leg on the  and sustained a skin tear and she would like to have this reevaluated as well. Patient denies any hematemesis, no melena, no medic easier no diarrhea, no nausea/vomiting associated. She rates her rectal and lower abdominal pain as 10/10 severity. No relieving factors are reported. No other complaints. Review of Systems:   A complete review of systems was performed and pertinent positives and negatives are stated within HPI, all other systems reviewed and are negative.    --------------------------------------------- PAST HISTORY ---------------------------------------------  Past Medical History:  has a past medical history of FABIO (acute kidney injury) ([facility] Cervical cancer ([address] Chest pain, Chronic renal impairment, stage 2 (mild), Erosive osteoarthritis of multiple sites, Former smoker, stopped smoking in distant past, Gastritis, GERD (gastroesophageal reflux disease), Glucose intolerance (impaired glucose tolerance), HTN (hypertension), ILD (interstitial lung disease) ([address] Microscopic polyangiitis ([address] MVP (mitral valve prolapse), Near syncope, Orthostatic hypotension, Pseudogout of left wrist, Pulmonary embolus ([address] Pulmonary fibrosis ([address] Pulmonary nodule, Sjogren's disease ([address] Volume depletion, and Wegener's granulomatosis (granulomatosis with polyangiitis). Past Surgical History:  has a past surgical history that includes ECHO Compl W Dop Color Flow (6/4/2015);  Kidney biopsy; Total knee arthroplasty (Bilateral); Tonsillectomy; and Ankle fracture surgery (Right, 6/30/2019). Social History:  reports that she quit smoking about 65 years ago. Her smoking use included cigarettes. She started smoking about 74 years ago. She has a 4.50 pack-year smoking history. She has never used smokeless tobacco. She reports current alcohol use. She reports that she does not use drugs. Family History: family history includes Breast Cancer in her sister and sister; Cancer in her mother; Other in her brother; Stroke in her father. The patients home medications have been reviewed.     Allergies: Codeine and Penicillins    -------------------------------------------------- RESULTS -------------------------------------------------  All laboratory and radiology results have been personally reviewed by myself   LABS:  Results for orders placed or performed during the hospital encounter of 10/22/22   CBC with Auto Differential   Result Value Ref Range    WBC 8.0 4.5 - 11.5 E9/L    RBC 3.77 3.50 - 5.50 E12/L    Hemoglobin 12.1 11.5 - 15.5 g/dL    Hematocrit 36.2 34.0 - 48.0 %    MCV 96.0 80.0 - 99.9 fL    MCH 32.1 26.0 - 35.0 pg    MCHC 33.4 32.0 - 34.5 %    RDW 15.3 (H) 11.5 - 15.0 fL    Platelets 082 939 - 461 E9/L    MPV 10.0 7.0 - 12.0 fL    Neutrophils % 52.0 43.0 - 80.0 %    Immature Granulocytes % 0.4 0.0 - 5.0 %    Lymphocytes % 33.6 20.0 - 42.0 %    Monocytes % 10.2 2.0 - 12.0 %    Eosinophils % 2.9 0.0 - 6.0 %    Basophils % 0.9 0.0 - 2.0 %    Neutrophils Absolute 4.15 1.80 - 7.30 E9/L    Immature Granulocytes # 0.03 E9/L    Lymphocytes Absolute 2.68 1.50 - 4.00 E9/L    Monocytes Absolute 0.81 0.10 - 0.95 E9/L    Eosinophils Absolute 0.23 0.05 - 0.50 E9/L    Basophils Absolute 0.07 0.00 - 0.20 E9/L   Comprehensive Metabolic Panel   Result Value Ref Range    Sodium 140 132 - 146 mmol/L    Potassium 4.0 3.5 - 5.0 mmol/L    Chloride 104 98 - 107 mmol/L    CO2 28 22 - 29 mmol/L    Anion Gap 8 7 - 16 mmol/L    Glucose 91 74 - 99 mg/dL    BUN 20 6 - 23 mg/dL    Creatinine 1.1 (H) 0.5 - 1.0 mg/dL    Est, Glom Filt Rate 48 >=60 mL/min/1.73    Calcium 9.1 8.6 - 10.2 mg/dL    Total Protein 6.5 6.4 - 8.3 g/dL    Albumin 3.7 3.5 - 5.2 g/dL    Total Bilirubin 1.0 0.0 - 1.2 mg/dL    Alkaline Phosphatase 91 35 - 104 U/L    ALT 37 (H) 0 - 32 U/L    AST 43 (H) 0 - 31 U/L   Lactic Acid   Result Value Ref Range    Lactic Acid 0.9 0.5 - 2.2 mmol/L   Lipase   Result Value Ref Range    Lipase 31 13 - 60 U/L   Urinalysis   Result Value Ref Range    Color, UA Yellow Straw/Yellow    Clarity, UA Clear Clear    Glucose, Ur Negative Negative mg/dL    Bilirubin Urine Negative Negative    Ketones, Urine TRACE (A) Negative mg/dL    Specific Gravity, UA 1.020 1.005 - 1.030    Blood, Urine Negative Negative    pH, UA 6.0 5.0 - 9.0    Protein, UA Negative Negative mg/dL    Urobilinogen, Urine 0.2 <2.0 E.U./dL    Nitrite, Urine Negative Negative    Leukocyte Esterase, Urine SMALL (A) Negative   Microscopic Urinalysis   Result Value Ref Range    WBC, UA 5-10 (A) 0 - 5 /HPF    RBC, UA 0-1 0 - 2 /HPF    Epithelial Cells, UA RARE /HPF    Bacteria, UA RARE (A) None Seen /HPF       RADIOLOGY:  Interpreted by Radiologist.  CT ABDOMEN PELVIS WO CONTRAST Additional Contrast? Oral   Final Result   Diffuse colonic fecal retention with distension of the lower rectosigmoid. Cardiomegaly. Small hiatal hernia. Cholelithiasis. FINDINGS:   Lower Chest: Pulmonary fibrosis involving both lung bases. Cardiomegaly. Small hiatal hernia. Organs: The liver, spleen, adrenal glands, pancreas are unremarkable. Cholelithiasis. Normal kidneys. GI/Bowel:   Diffuse colonic fecal retention with distension of the lower   rectosigmoid. Sigmoid diverticulosis. Normal small bowel and appendix. Pelvis: Normal urinary bladder. Peritoneum/Retroperitoneum: No free air or free fluid.      Bones/Soft Tissues: Degenerative changes visualized thoracolumbar spine.                  ------------------------- NURSING NOTES AND VITALS REVIEWED ---------------------------  The nursing notes within the ED encounter and vital signs as below have been reviewed. /82   Pulse 88   Temp 97.8 °F (36.6 °C) (Oral)   Resp 16   SpO2 98%   Oxygen Saturation Interpretation: Normal      ---------------------------------------------------PHYSICAL EXAM--------------------------------------      Constitutional/General: Alert and oriented x3, well appearing, non toxic in moderate distress  Head: Normocephalic and atraumatic  Eyes: PERRL, EOMI, no scleral icterus  Mouth: Oropharynx clear, handling secretions, no trismus  Neck: Supple, full ROM, no JVD. Trachea midline  Pulmonary: Lungs clear to auscultation bilaterally, no wheezes, rales, or rhonchi. Not in respiratory distress  Cardiovascular:  Regular rate and rhythm, no murmurs, gallops, or rubs. 2+ distal pulses  Abdomen: Soft, non distended, mild left lower quadrant tenderness but no rebound tenderness no guarding or rigidity. No focal tenderness right lower quadrant/McBurney's point. Normal active bowel sounds  Rectal Exam:  (RN chaperone present at all times) fecal impaction with no perirectal masses nor hemorrhoids, no anal fissures Hemoccult negative brown stool  Extremities: Moves all extremities x 4.  Warm and well perfused  Skin: warm and dry without rash no petechia no purpura no target lesions  Neurologic: GCS 15, cranial nerves II through XII intact no focal deficits, no meningeal signs  Psych: Normal Affect      ------------------------------ ED COURSE/MEDICAL DECISION MAKING----------------------  Medications   magnesium citrate solution 300 mL (300 mLs Oral Not Given 10/22/22 2247)   0.9 % sodium chloride bolus (1,000 mLs IntraVENous New Bag 10/22/22 2006)   fentaNYL (SUBLIMAZE) injection 50 mcg (50 mcg IntraVENous Given 10/22/22 2009)   ondansetron (ZOFRAN) injection 4 mg (4 mg IntraVENous Given 10/22/22 2007)   cefTRIAXone (ROCEPHIN) 1,000 mg in sterile water 10 mL IV syringe (1,000 mg IntraVENous Given 10/22/22 3406)   diptheria-tetanus toxoids (DECAVAC) 2-2 LF/0.5ML injection 0.5 mL (0.5 mLs IntraMUSCular Given 10/23/22 0013)         ED COURSE:     Medical Decision Making:   Patient does have evidence of fecal impaction she was manually disimpacted she did also receive a soapsuds enema this is achieved significant stool output and relief of her symptoms. Patient did also receive local prescription for MiraLAX. CT abdominal study showed no evidence of any bowel obstruction or free air/visceral perforation. PROCEDURE  10/22/22         FECAL DISIMPACTION  Risks, benefits and alternatives (for applicable procedures below) described. Performed By: Ray Murillo MD.  Indication: Fecal impaction                                                                                   . Location:   Rectum . Informed consent: Verbal consent obtained. The patient was counseled regarding the procedure in person, it's indications, risks, potential complications and alternatives and any questions were answered. Verbal consent was obtained. Prep: The skin was cleansed with chlorhexidine gluconate   Local Anesthesia:  lidocaine Topical Jel 1%. Ultrasound Guidance:   not used. Complications:  None. Patient tolerated the procedure well. Counseling: The emergency provider has spoken with the patient and discussed todays results, in addition to providing specific details for the plan of care and counseling regarding the diagnosis and prognosis. Questions are answered at this time and they are agreeable with the plan.      --------------------------------- IMPRESSION AND DISPOSITION ---------------------------------    IMPRESSION  1. Fecal impaction in rectum ([address]    2. Cystitis without hematuria    3. Constipation, unspecified constipation type    4.  Noninfected skin tear of right lower extremity, initial encounter        DISPOSITION  Disposition: Discharge to home  Patient condition is stable      NOTE: This report was transcribed using voice recognition software.  Every effort was made to ensure accuracy; however, inadvertent computerized transcription errors may be present        Mariann Aragon MD  10/27/22 8077

## 2022-10-23 PROCEDURE — 6360000002 HC RX W HCPCS: Performed by: EMERGENCY MEDICINE

## 2022-10-23 PROCEDURE — 90714 TD VACC NO PRESV 7 YRS+ IM: CPT | Performed by: EMERGENCY MEDICINE

## 2022-10-23 PROCEDURE — 90471 IMMUNIZATION ADMIN: CPT | Performed by: EMERGENCY MEDICINE

## 2022-10-23 RX ORDER — POLYETHYLENE GLYCOL 3350 17 G/17G
17 POWDER, FOR SOLUTION ORAL DAILY PRN
Qty: 30 G | Refills: 2 | Status: SHIPPED | OUTPATIENT
Start: 2022-10-23 | End: 2022-11-22

## 2022-10-23 RX ORDER — CEFDINIR 300 MG/1
300 CAPSULE ORAL DAILY
Qty: 5 CAPSULE | Refills: 0 | Status: SHIPPED | OUTPATIENT
Start: 2022-10-23 | End: 2022-10-28

## 2022-10-23 RX ORDER — BACITRACIN, NEOMYCIN, POLYMYXIN B 400; 3.5; 5 [USP'U]/G; MG/G; [USP'U]/G
OINTMENT TOPICAL
Qty: 30 G | Refills: 0 | Status: SHIPPED | OUTPATIENT
Start: 2022-10-24 | End: 2022-11-02

## 2022-10-23 RX ADMIN — TETANUS AND DIPHTHERIA TOXOIDS ADSORBED 0.5 ML: 2; 2 INJECTION INTRAMUSCULAR at 00:13

## 2022-10-23 ASSESSMENT — PAIN - FUNCTIONAL ASSESSMENT: PAIN_FUNCTIONAL_ASSESSMENT: NONE - DENIES PAIN

## 2022-10-23 NOTE — ED NOTES
Disimpaction done per Dr Gonzalez Sat. 2000ml soap suds enema administered, tolerated well. Passed small amt of brown formed stool and  large amt of liquid brown. Impaction still evident. Removed large amt of formed brown stool digitally. Patient tolerated well then requested a second enema. 2000ml soap suds enema administered and returned large amt liquid stool and small amt formed stool. States feels better and feels as if rectal impaction relieved. Unable to palpate any more stool at the introitus.       Jeff Thompson RN  10/23/22 5321

## 2022-10-23 NOTE — DISCHARGE INSTRUCTIONS
NOTE:  Make sure to see your family doctor (Joshua Angeles) on Monday 10/24/2022 for re-evaluation. Call the [facility]) Referral Line for a provider to see you if unable to be seen by your own doctor. Get IMMEDIATE medical attention if any NEW/WORSENING symptoms occur!

## 2022-10-23 NOTE — ED NOTES
Rt lower extremity skin tear cleansed with skintegrity wound cleanser and statwound applied per order. Telfa dressing applied.       Lam Pryor RN  10/23/22 9124

## 2023-01-17 ENCOUNTER — TELEPHONE (OUTPATIENT)
Dept: ADMINISTRATIVE | Age: 88
End: 2023-01-17

## 2023-01-17 PROBLEM — E78.5 DYSLIPIDEMIA: Status: ACTIVE | Noted: 2023-01-17

## 2023-01-17 NOTE — TELEPHONE ENCOUNTER
NP scheduled from the [facility]. Patient Appointment Form:      PCP: Dr. Gerson Li  Referring: Coco Shaw    Has the Patient:    Seen a Cardiologist? Yes IP Cipriano Mullen and Jean 2015 Recs in [employer]    Had a heart catheterization? no    Had heart surgery? no    Had a stress test or nuclear stress test? Yes 2015 and 2020 Epic     Had an echocardiogram? Yes 2022 2016 and 2015 Epic     Had a vascular ultrasound? no    Had a 24/48 heart monitor or extended cardiac event monitor? no    Had recent blood work in the last 6 months?  Yes 1/17/23 [facility] PCP     Had a pacemaker/ICD/ILR implant? no    Seen an Electrophysiologist? no        Will send records via: Prior IP recs in [address] [place] [address] - PCP recs in [facility]       Date & time of appointment:  1/24/23 @ 11:30 with \"Dr. Eb Adams"

## 2023-01-24 ENCOUNTER — OFFICE VISIT (OUTPATIENT)
Dept: CARDIOLOGY CLINIC | Age: 88
End: 2023-01-24
Payer: MEDICARE

## 2023-01-24 VITALS
HEIGHT: 65 IN | RESPIRATION RATE: 18 BRPM | HEART RATE: 64 BPM | BODY MASS INDEX: 27.66 KG/M2 | WEIGHT: 166 LBS | SYSTOLIC BLOOD PRESSURE: 116 MMHG | DIASTOLIC BLOOD PRESSURE: 78 MMHG

## 2023-01-24 DIAGNOSIS — R00.2 PALPITATIONS: ICD-10-CM

## 2023-01-24 DIAGNOSIS — I49.9 IRREGULAR HEART BEAT: Primary | ICD-10-CM

## 2023-01-24 DIAGNOSIS — M31.30 GRANULOMATOSIS WITH POLYANGIITIS, UNSPECIFIED WHETHER RENAL INVOLVEMENT (HCC): Chronic | ICD-10-CM

## 2023-01-24 PROCEDURE — 93000 ELECTROCARDIOGRAM COMPLETE: CPT | Performed by: INTERNAL MEDICINE

## 2023-01-24 PROCEDURE — 1123F ACP DISCUSS/DSCN MKR DOCD: CPT | Performed by: INTERNAL MEDICINE

## 2023-01-24 PROCEDURE — 99204 OFFICE O/P NEW MOD 45 MIN: CPT | Performed by: INTERNAL MEDICINE

## 2023-01-24 RX ORDER — DOCUSATE SODIUM 100 MG/1
100 CAPSULE, LIQUID FILLED ORAL NIGHTLY
COMMUNITY

## 2023-01-24 NOTE — PROGRESS NOTES
Patient was seen today and a 3 day ZIO XT  monitor was placed. Monitor was ordered by . The monitor was applied, instructions were given to the patient. Patient stated understanding and gave verbalize readback. Monitor company: [employer]  Serial number: [id number]      .

## 2023-01-24 NOTE — PROGRESS NOTES
Chief Complaint   Patient presents with    Abnormal Test Results       Patient Active Problem List    Diagnosis Date Noted    Sjogren's disease ([facility] 06/09/2015     Priority: High     Class: Acute    ILD (interstitial lung disease) ([facility] 06/09/2015     Priority: High     Class: Acute    Dyslipidemia 01/17/2023    TIA (transient ischemic attack) 07/17/2022    Major depressive disorder with single episode, in partial remission ([facility] 12/23/2020    Stage 3a chronic kidney disease ([facility] 11/23/2020    History of pulmonary embolus (PE) 06/29/2019    Essential hypertension 06/29/2019    Granulomatosis with polyangiitis ([facility] 09/29/2015     Overview Note:     \      Cervical cancer ([facility] 06/03/2015     Class: Chronic     Overview Note:     1970      GERD (gastroesophageal reflux disease) 06/03/2015     Class: Chronic       Current Outpatient Medications   Medication Sig Dispense Refill    docusate sodium (COLACE) 100 MG capsule Take 100 mg by mouth nightly      predniSONE (DELTASONE) 5 MG tablet Take 1 tablet by mouth daily 30 tablet 1    pantoprazole (PROTONIX) 40 MG tablet Take 1 tablet by mouth once daily 90 tablet 1    citalopram (CELEXA) 20 MG tablet Take 1 tablet by mouth daily 30 tablet 3    atorvastatin (LIPITOR) 20 MG tablet Take 1 tablet by mouth daily 30 tablet 3    donepezil (ARICEPT) 5 MG tablet Take 1 tablet by mouth nightly Take 5 mg by mouth nightly 90 tablet 1    Potassium 99 MG TABS Take by mouth daily      Probiotic Acidophilus (FLORANEX) TABS Take 1 tablet by mouth daily      acetaminophen (TYLENOL) 500 MG tablet Take 1,000 mg by mouth at bedtime      sulfamethoxazole-trimethoprim (BACTRIM DS;SEPTRA DS) 800-160 MG per tablet Take 1 tablet by mouth See Admin Instructions Given Monday,Wednesday,Friday      aspirin 81 MG chewable tablet Take 1 tablet by mouth in the morning.  30 tablet 3    Vitamin D (CHOLECALCIFEROL) 25 MCG (1000 UT) TABS tablet Take 1,000 Units by mouth daily      Multiple Vitamins-Minerals (MULTI VITAMIN/MINERALS) TABS Take 1 tablet by mouth daily       No current facility-administered medications for this visit. Allergies   Allergen Reactions    Codeine Hives and Itching    Penicillins Hives, Itching and Rash     Tolerated cephalosporin in the past       Vitals:    01/24/23 1116   BP: 116/78   Pulse: 64   Resp: 18   Weight: 166 lb (75.3 kg)   Height: 5' 5\" (1.651 m)                 SUBJECTIVE: Loretta Delatorre presents to the office today for consult - dr Solo Villalobos. Vibrant elderly female, lives with , does light chores. She complains of palpitations only if she lays on her stomach and denies   dyspnea, exertional chest pressure/discomfort, fatigue, lower extremity edema, near-syncope, orthopnea, paroxysmal nocturnal dyspnea, syncope, and tachypnea. Had EKG in PCP office - faxed copy - questionable sinus rhythm with PACs  Had benign TTE 2022 and normal nuclear stress 2020  On Aricept for memory loss          Physical Exam   /78   Pulse 64   Resp 18   Ht 5' 5\" (1.651 m)   Wt 166 lb (75.3 kg)   BMI 27.62 kg/m²   Constitutional: Oriented to person, place, and time. Well-developed and well-nourished. No distress. Head: Normocephalic and atraumatic. Neck: No JVD present. Carotid bruit is not present. Cardiovascular: Normal rate, regular rhythm, normal heart sounds and intact distal pulses. No gallop and no friction rub. No murmur heard. Pulmonary/Chest: Effort normal and breath sounds normal.   Abdominal: Soft. Bowel sounds are normal. No distension and no mass. Musculoskeletal: No edema   Neurological: Alert and oriented to person, place, and time. Skin: Skin is warm and dry. No rash noted. Psychiatric: Normal mood and affect.  Behavior is normal.     EKG:  normal sinus rhythm,  septal MI .    ASSESSMENT AND PLAN:  Patient Active Problem List   Diagnosis    Cervical cancer (HCC)    GERD (gastroesophageal reflux disease)    Sjogren's disease (HCC)    ILD (interstitial lung disease) ([facility]    Granulomatosis with polyangiitis (HCC)    History of pulmonary embolus (PE)    Essential hypertension    Stage 3a chronic kidney disease (HCC)    Major depressive disorder with single episode, in partial remission (HCC)    TIA (transient ischemic attack)    Dyslipidemia     Patient with hx of multiple serious medical problems, non of which seem active and lifestyle limiting  No true cardiac symptoms  Normal non invasive testing in last 2 years  EKG from PCP not clearly reflective of significant rhythm disturbance  Will place 3 day Meron Cain M.D  [id number] [facility]

## 2023-02-01 DIAGNOSIS — I49.9 IRREGULAR HEART BEAT: ICD-10-CM

## 2023-02-01 DIAGNOSIS — M31.30 GRANULOMATOSIS WITH POLYANGIITIS, UNSPECIFIED WHETHER RENAL INVOLVEMENT (HCC): Chronic | ICD-10-CM

## 2023-02-01 DIAGNOSIS — R00.2 PALPITATIONS: ICD-10-CM

## 2023-02-02 ENCOUNTER — TELEPHONE (OUTPATIENT)
Dept: CARDIOLOGY CLINIC | Age: 88
End: 2023-02-02

## 2023-02-02 NOTE — TELEPHONE ENCOUNTER
----- Message from Isrrael Chowdhury MD sent at 2/1/2023  4:01 PM EST -----  Monitor shows NO AFib and nothing that needs meds at this point, as she is asymptomatic  Ov prn      ----- Message -----  From: Deisi Campos  Sent: 2/1/2023   3:48 PM EST  To: Isrrael Chowdhury MD

## 2023-03-06 ENCOUNTER — HOSPITAL ENCOUNTER (EMERGENCY)
Age: 88
Discharge: HOME | End: 2023-03-06
Payer: MEDICARE

## 2023-03-06 VITALS
OXYGEN SATURATION: 97 % | WEIGHT: 150 LBS | TEMPERATURE: 97.6 F | RESPIRATION RATE: 20 BRPM | HEIGHT: 65 IN | DIASTOLIC BLOOD PRESSURE: 78 MMHG | BODY MASS INDEX: 24.99 KG/M2 | SYSTOLIC BLOOD PRESSURE: 123 MMHG | HEART RATE: 75 BPM

## 2023-03-06 DIAGNOSIS — S81.812A LACERATION OF LEFT LOWER EXTREMITY, INITIAL ENCOUNTER: Primary | ICD-10-CM

## 2023-03-06 PROCEDURE — 6370000000 HC RX 637 (ALT 250 FOR IP): Performed by: NURSE PRACTITIONER

## 2023-03-06 PROCEDURE — 2500000003 HC RX 250 WO HCPCS: Performed by: NURSE PRACTITIONER

## 2023-03-06 PROCEDURE — 99283 EMERGENCY DEPT VISIT LOW MDM: CPT

## 2023-03-06 PROCEDURE — 12002 RPR S/N/AX/GEN/TRNK2.6-7.5CM: CPT

## 2023-03-06 PROCEDURE — 12001 RPR S/N/AX/GEN/TRNK 2.5CM/<: CPT

## 2023-03-06 RX ORDER — ACETAMINOPHEN 500 MG
1000 TABLET ORAL ONCE
Status: COMPLETED | OUTPATIENT
Start: 2023-03-06 | End: 2023-03-06

## 2023-03-06 RX ORDER — DOXYCYCLINE HYCLATE 100 MG
100 TABLET ORAL 2 TIMES DAILY
Qty: 20 TABLET | Refills: 0 | Status: SHIPPED | OUTPATIENT
Start: 2023-03-06 | End: 2023-03-16

## 2023-03-06 RX ORDER — LIDOCAINE HYDROCHLORIDE 10 MG/ML
20 INJECTION, SOLUTION INFILTRATION; PERINEURAL ONCE
Status: COMPLETED | OUTPATIENT
Start: 2023-03-06 | End: 2023-03-06

## 2023-03-06 RX ORDER — BACITRACIN ZINC 500 [USP'U]/G
OINTMENT TOPICAL ONCE
Status: COMPLETED | OUTPATIENT
Start: 2023-03-06 | End: 2023-03-06

## 2023-03-06 RX ADMIN — LIDOCAINE HYDROCHLORIDE 20 ML: 10 INJECTION, SOLUTION INFILTRATION; PERINEURAL at 13:36

## 2023-03-06 RX ADMIN — BACITRACIN ZINC: 500 OINTMENT TOPICAL at 13:37

## 2023-03-06 RX ADMIN — ACETAMINOPHEN 1000 MG: 500 TABLET ORAL at 13:35

## 2023-03-06 ASSESSMENT — PAIN DESCRIPTION - DESCRIPTORS
DESCRIPTORS: ACHING;DISCOMFORT;THROBBING
DESCRIPTORS: ACHING
DESCRIPTORS: ACHING;DISCOMFORT;POUNDING

## 2023-03-06 ASSESSMENT — PAIN DESCRIPTION - ORIENTATION
ORIENTATION: LEFT
ORIENTATION: LEFT;LOWER

## 2023-03-06 ASSESSMENT — PAIN DESCRIPTION - LOCATION
LOCATION: LEG
LOCATION: LEG

## 2023-03-06 ASSESSMENT — PAIN SCALES - GENERAL
PAINLEVEL_OUTOF10: 3
PAINLEVEL_OUTOF10: 5

## 2023-03-06 ASSESSMENT — PAIN DESCRIPTION - PAIN TYPE: TYPE: ACUTE PAIN

## 2023-03-06 ASSESSMENT — PAIN - FUNCTIONAL ASSESSMENT: PAIN_FUNCTIONAL_ASSESSMENT: 0-10

## 2023-03-06 ASSESSMENT — PAIN DESCRIPTION - FREQUENCY: FREQUENCY: CONTINUOUS

## 2023-03-06 ASSESSMENT — PAIN DESCRIPTION - ONSET: ONSET: SUDDEN

## 2023-03-06 NOTE — ED PROVIDER NOTES
Independent [facility] Visit. [address] [facility]  Department of Emergency Medicine   ED  Encounter Note  Admit Date/RoomTime: 3/6/2023 12:58 PM  ED Room:     NAME: Titus Ac  : 3/3/1932  MRN: [id number]     Chief Complaint:  Laceration (Cut to left lower leg.  )    History of Present Illness       Titus Ac is a 80 y.o. old female presenting to the emergency department by private vehicle with her sister for a laceration to the left lower leg, caused by pulling a wooden dresser drawer out and hitting the leg, which occurred at home approximately a few minute(s) prior to arrival. There is not a possibility of retained foreign body in the affected area. Bleeding is controlled. She takes no blood thinning agents. There is a mild pain at injury site. Tetanus Status:  up to date . She denies any bony tenderness and denies any difficulty ambulating. **Informed Consent**    The patient has given verbal consent to have photos taken of left  and electronically inserted into their ED Provider Note as part of their permanent medical record for purposes of illustration, documentation, treatment management and/or medical review. All Images taken on 3/6/23 of patient name: Titus Ac were taken by a [address] [place] [address],[address] approved registered mobile device via [employer] mobile application and transmitted then stored on a secured [employer] located within [facility]. Approximately 4 cm \"L\" shape laceration. ROS   Pertinent positives and negatives are stated within HPI, all other systems reviewed and are negative.     Past Medical History:  has a past medical history of FABIO (acute kidney injury) ([address] Cervical cancer ([address] Chest pain, Chronic renal impairment, stage 2 (mild), Erosive osteoarthritis of multiple sites, Former smoker, stopped smoking in distant past, Gastritis, GERD (gastroesophageal reflux disease), Glucose intolerance (impaired glucose tolerance), HTN (hypertension), ILD (interstitial lung disease) (HCC), Microscopic polyangiitis (HCC), MVP (mitral valve prolapse), Near syncope, Orthostatic hypotension, Pseudogout of left wrist, Pulmonary embolus (HCC), Pulmonary fibrosis (HCC), Pulmonary nodule, Sjogren's disease (HCC), Volume depletion, and Wegener's granulomatosis (granulomatosis with polyangiitis).    Surgical History:  has a past surgical history that includes ECHO Compl W Dop Color Flow (6/4/2015); Kidney biopsy; Total knee arthroplasty (Bilateral); Tonsillectomy; and Ankle fracture surgery (Right, 6/30/2019).    Social History:  reports that she quit smoking about 65 years ago. Her smoking use included cigarettes. She started smoking about 74 years ago. She has a 4.50 pack-year smoking history. She has never used smokeless tobacco. She reports current alcohol use. She reports that she does not use drugs.    Family History: family history includes Breast Cancer in her sister and sister; Cancer in her mother; Other in her brother; Stroke in her father.     Allergies: Codeine and Penicillins    Physical Exam  Physical Exam   Oxygen Saturation Interpretation: Normal.        ED Triage Vitals   BP Temp Temp Source Heart Rate Resp SpO2 Height Weight   03/06/23 1257 03/06/23 1257 03/06/23 1257 03/06/23 1257 03/06/23 1257 03/06/23 1257 03/06/23 1306 03/06/23 1306   123/78 97.6 °F (36.4 °C) Oral 75 20 97 % 5' 5\" (1.651 m) 150 lb (68 kg)         Constitutional:  Alert, development consistent with age.  Neck:  Normal ROM.  Supple.  Extremity(s):  Left: leg.              Tenderness:  none.              Swelling: None.            Calf:  No significant calf/ankle edema..            Deformity: No.               ROM: full range of motion.              Skin:  see photo above.       Neurovascular:              Motor deficit: none.              Sensory deficit: none, full sensation intact to light touch in distal toes.               Pulse  deficit: none. 2 + pedal and posterior tibial pulses intact. Capillary refill: normal. Brisk less than 3 seconds in distal toes. Gait:  normal.  Lymphatics: No lymphangitis or adenopathy noted. Neurological:  Oriented. Motor functions intact. Lab / Imaging Results   (All laboratory and radiology results have been personally reviewed by myself)  Labs:  No results found for this visit on 03/06/23. Imaging: All Radiology results interpreted by Radiologist unless otherwise noted. No orders to display     ED Course / Medical Decision Making     Medications   lidocaine 1 % injection 20 mL (has no administration in time range)   bacitracin zinc ointment (has no administration in time range)   acetaminophen (TYLENOL) tablet 1,000 mg (has no administration in time range)          Consult(s):   None    Procedure(s):   PROCEDURE NOTE  3/6/23       Time: 36    LACERATION REPAIR  Risks, benefits and alternatives (for applicable procedures below) described. Performed By: HANDY Scott CNP. Informed consent: Verbal consent obtained. The patient was counseled regarding the procedure in person, it's indications, risks, potential complications and alternatives and any questions were answered. Verbal consent was obtained. Laceration #: 1. Location: left lower leg  Length: approximately 4 cm. The wound area was irrigated with sterile saline and draped in a sterile fashion. Local Anesthesia:  obtained with Lidocaine 1% without epinephrine. The wound was explored with the following results: Thickness: full thickness. no foreign body or tendon injury seen. Debridement: None. Undermining: None. Wound Margins Revised: None. Flaps Aligned: yes. The wound was closed in single layer closure with #9  4-0 Ethilon using interrupted suture(s). Dressing:  bacitracin, a sterile non adhesive dressing, and a ace wrap bandage. There were no additional wounds requiring formal closure.  Patient tolerated procedure well. History from : Patient    Limitations to history : None    Discussion with Other Professionals : None    Social Determinants Significantly Affecting Health : None     MDM:   Dalia Altman is a 80 y.o. old female presenting to the emergency department by private vehicle with her sister for a laceration to the left lower leg, caused by pulling a wooden dresser drawer out and hitting the leg, which occurred at home approximately a few minute(s) prior to arrival. There is not a possibility of retained foreign body in the affected area. Bleeding is controlled. She takes no blood thinning agents. There is a mild pain at injury site. Tetanus Status:  up to date 2020. She denies any bony tenderness and denies any difficulty ambulating. Differential diagnosis to include leg laceration. Patient has no bony tenderness on examination and therefore I did not suspect any bony injury. Compartments are soft and compressible with intact distal pulses. Laceration was cleaned and irrigated and sutured. Her tetanus booster was updated today  Patient was advised on signs and symptoms of infection warranting immediate return to the ED for reevaluation. Patient is on chronic prednisone and will give her antibiotics prophylactically. Patient also advised to follow-up with her PCP for reevaluation in the next 2 to 3 days and for suture removal in 10 to 12 days. Patient advised to elevate the legs is much as possible above the level of the heart. Patient verbalized adequate understanding of discharge instruction and follow-up care she will be given a prescription for doxycycline. Patient was ambulatory with a steady gait on discharge. Plan of Care/Counseling:  HANDY Sevilla CNP reviewed today's visit with the patient and sister(s) in addition to providing specific details for the plan of care and counseling regarding the diagnosis and prognosis.   Questions are answered at this time and are agreeable with the plan. Assessment      1. Laceration of left lower extremity, initial encounter      Plan   Discharged home. Patient condition is good    New Medications     New Prescriptions    No medications on file     Electronically signed by HANDY Thapa CNP   DD: 3/6/23  **This report was transcribed using voice recognition software. Every effort was made to ensure accuracy; however, inadvertent computerized transcription errors may be present.   END OF ED PROVIDER NOTE       HANDY Thapa CNP  03/06/23 1150

## 2023-03-06 NOTE — DISCHARGE INSTRUCTIONS
Elevate the leg as much as possible and keep clean and dry for the next 48 hours and no peroxide to the leg.

## 2023-03-21 PROBLEM — S81.811A LACERATION OF RIGHT LOWER LEG: Status: ACTIVE | Noted: 2023-03-21

## 2023-03-23 ENCOUNTER — HOSPITAL ENCOUNTER (EMERGENCY)
Age: 88
Discharge: HOME | End: 2023-03-23
Attending: EMERGENCY MEDICINE
Payer: MEDICARE

## 2023-03-23 VITALS
HEART RATE: 66 BPM | WEIGHT: 155 LBS | SYSTOLIC BLOOD PRESSURE: 146 MMHG | TEMPERATURE: 98.6 F | BODY MASS INDEX: 25.79 KG/M2 | DIASTOLIC BLOOD PRESSURE: 74 MMHG | RESPIRATION RATE: 18 BRPM | OXYGEN SATURATION: 96 %

## 2023-03-23 DIAGNOSIS — E86.0 DEHYDRATION: ICD-10-CM

## 2023-03-23 DIAGNOSIS — R19.7 DIARRHEA, UNSPECIFIED TYPE: Primary | ICD-10-CM

## 2023-03-23 DIAGNOSIS — K64.9 HEMORRHOIDS, UNSPECIFIED HEMORRHOID TYPE: ICD-10-CM

## 2023-03-23 LAB
ALBUMIN SERPL-MCNC: 3.3 G/DL (ref 3.5–5.2)
ALP SERPL-CCNC: 59 U/L (ref 35–104)
ALT SERPL-CCNC: 11 U/L (ref 0–32)
ANION GAP SERPL CALCULATED.3IONS-SCNC: 10 MMOL/L (ref 7–16)
AST SERPL-CCNC: 19 U/L (ref 0–31)
BACTERIA URNS QL MICRO: ABNORMAL /HPF
BASOPHILS # BLD: 0.06 E9/L (ref 0–0.2)
BASOPHILS NFR BLD: 0.7 % (ref 0–2)
BILIRUB SERPL-MCNC: 0.8 MG/DL (ref 0–1.2)
BILIRUB UR QL STRIP: NEGATIVE
BUN SERPL-MCNC: 14 MG/DL (ref 6–23)
CALCIUM SERPL-MCNC: 8.3 MG/DL (ref 8.6–10.2)
CHLORIDE SERPL-SCNC: 105 MMOL/L (ref 98–107)
CLARITY UR: CLEAR
CO2 SERPL-SCNC: 25 MMOL/L (ref 22–29)
COLOR UR: YELLOW
CREAT SERPL-MCNC: 0.9 MG/DL (ref 0.5–1)
EOSINOPHIL # BLD: 0.14 E9/L (ref 0.05–0.5)
EOSINOPHIL NFR BLD: 1.6 % (ref 0–6)
EPI CELLS #/AREA URNS HPF: ABNORMAL /HPF
ERYTHROCYTE [DISTWIDTH] IN BLOOD BY AUTOMATED COUNT: 15.1 FL (ref 11.5–15)
GLUCOSE SERPL-MCNC: 84 MG/DL (ref 74–99)
GLUCOSE UR STRIP-MCNC: NEGATIVE MG/DL
HCT VFR BLD AUTO: 37.6 % (ref 34–48)
HGB BLD-MCNC: 12.1 G/DL (ref 11.5–15.5)
HGB UR QL STRIP: ABNORMAL
IMM GRANULOCYTES # BLD: 0.04 E9/L
IMM GRANULOCYTES NFR BLD: 0.5 % (ref 0–5)
KETONES UR STRIP-MCNC: 15 MG/DL
LEUKOCYTE ESTERASE UR QL STRIP: NEGATIVE
LYMPHOCYTES # BLD: 1.78 E9/L (ref 1.5–4)
LYMPHOCYTES NFR BLD: 20.2 % (ref 20–42)
MCH RBC QN AUTO: 31.3 PG (ref 26–35)
MCHC RBC AUTO-ENTMCNC: 32.2 % (ref 32–34.5)
MCV RBC AUTO: 97.4 FL (ref 80–99.9)
MONOCYTES # BLD: 0.87 E9/L (ref 0.1–0.95)
MONOCYTES NFR BLD: 9.9 % (ref 2–12)
NEUTROPHILS # BLD: 5.92 E9/L (ref 1.8–7.3)
NEUTS SEG NFR BLD: 67.1 % (ref 43–80)
NITRITE UR QL STRIP: NEGATIVE
PH UR STRIP: 5.5 [PH] (ref 5–9)
PLATELET # BLD AUTO: 171 E9/L (ref 130–450)
PMV BLD AUTO: 10.3 FL (ref 7–12)
POTASSIUM SERPL-SCNC: 4 MMOL/L (ref 3.5–5)
PROT SERPL-MCNC: 6 G/DL (ref 6.4–8.3)
PROT UR STRIP-MCNC: NEGATIVE MG/DL
RBC # BLD AUTO: 3.86 E12/L (ref 3.5–5.5)
RBC #/AREA URNS HPF: ABNORMAL /HPF (ref 0–2)
SODIUM SERPL-SCNC: 140 MMOL/L (ref 132–146)
SP GR UR STRIP: 1.01 (ref 1–1.03)
UROBILINOGEN UR STRIP-ACNC: 0.2 E.U./DL
WBC # BLD: 8.8 E9/L (ref 4.5–11.5)
WBC #/AREA URNS HPF: ABNORMAL /HPF (ref 0–5)

## 2023-03-23 PROCEDURE — 93005 ELECTROCARDIOGRAM TRACING: CPT | Performed by: EMERGENCY MEDICINE

## 2023-03-23 PROCEDURE — 96360 HYDRATION IV INFUSION INIT: CPT

## 2023-03-23 PROCEDURE — 96361 HYDRATE IV INFUSION ADD-ON: CPT

## 2023-03-23 PROCEDURE — 6370000000 HC RX 637 (ALT 250 FOR IP): Performed by: EMERGENCY MEDICINE

## 2023-03-23 PROCEDURE — 99284 EMERGENCY DEPT VISIT MOD MDM: CPT

## 2023-03-23 PROCEDURE — 2580000003 HC RX 258: Performed by: EMERGENCY MEDICINE

## 2023-03-23 PROCEDURE — 81001 URINALYSIS AUTO W/SCOPE: CPT

## 2023-03-23 PROCEDURE — 80053 COMPREHEN METABOLIC PANEL: CPT

## 2023-03-23 PROCEDURE — 85025 COMPLETE CBC W/AUTO DIFF WBC: CPT

## 2023-03-23 RX ORDER — LOPERAMIDE HYDROCHLORIDE 2 MG/1
2 CAPSULE ORAL 4 TIMES DAILY PRN
Qty: 15 CAPSULE | Refills: 0 | Status: SHIPPED | OUTPATIENT
Start: 2023-03-23 | End: 2023-04-02

## 2023-03-23 RX ORDER — ACETAMINOPHEN 500 MG
1000 TABLET ORAL ONCE
Status: COMPLETED | OUTPATIENT
Start: 2023-03-23 | End: 2023-03-23

## 2023-03-23 RX ORDER — LOPERAMIDE HYDROCHLORIDE 2 MG/1
2 CAPSULE ORAL ONCE
Status: COMPLETED | OUTPATIENT
Start: 2023-03-23 | End: 2023-03-23

## 2023-03-23 RX ORDER — HYDROCORTISONE 25 MG/G
CREAM TOPICAL
Qty: 1 EACH | Refills: 0 | Status: SHIPPED | OUTPATIENT
Start: 2023-03-23

## 2023-03-23 RX ORDER — 0.9 % SODIUM CHLORIDE 0.9 %
1000 INTRAVENOUS SOLUTION INTRAVENOUS ONCE
Status: COMPLETED | OUTPATIENT
Start: 2023-03-23 | End: 2023-03-23

## 2023-03-23 RX ADMIN — SODIUM CHLORIDE 1000 ML: 9 INJECTION, SOLUTION INTRAVENOUS at 15:16

## 2023-03-23 RX ADMIN — LOPERAMIDE HYDROCHLORIDE 2 MG: 2 CAPSULE ORAL at 15:13

## 2023-03-23 RX ADMIN — ACETAMINOPHEN 1000 MG: 500 TABLET ORAL at 15:13

## 2023-03-23 ASSESSMENT — LIFESTYLE VARIABLES
HOW OFTEN DO YOU HAVE A DRINK CONTAINING ALCOHOL: NEVER
HOW MANY STANDARD DRINKS CONTAINING ALCOHOL DO YOU HAVE ON A TYPICAL DAY: PATIENT DOES NOT DRINK

## 2023-03-23 ASSESSMENT — PAIN - FUNCTIONAL ASSESSMENT
PAIN_FUNCTIONAL_ASSESSMENT: 0-10
PAIN_FUNCTIONAL_ASSESSMENT: 0-10

## 2023-03-23 ASSESSMENT — PAIN SCALES - GENERAL
PAINLEVEL_OUTOF10: 8
PAINLEVEL_OUTOF10: 10
PAINLEVEL_OUTOF10: 7

## 2023-03-23 ASSESSMENT — PAIN DESCRIPTION - LOCATION
LOCATION: RECTUM
LOCATION: RECTUM

## 2023-03-23 NOTE — ED PROVIDER NOTES
HPI:  3/23/23, Time: 3:03 PM EDT         Marbella Richards is a 80 y.o. female presenting to the ED for diarrhea beginning yesterday. Patient reports several episodes of loose nonbloody stools. She also states her hemorrhoids have been acting up. No bleeding. No abdominal pain, fevers, nausea, or vomiting. States that she recently recovered from COVID-19 a few weeks ago. She is had no chest pain, shortness of breath, or cough. No recent antibiotic use or history of C. difficile. I reviewed the patient's chart. She was seen by her PCP, Dr. Javier Matamoros, in the office on 3/21/2023. She was seen for staple removal after laceration. Review of Systems:  Complete ROS otherwise negative unless stated in HPI.    --------------------------------------------- PAST HISTORY ---------------------------------------------  Past Medical History:  has a past medical history of FABIO (acute kidney injury) ([address] Cervical cancer ([address] Chest pain, Chronic renal impairment, stage 2 (mild), Erosive osteoarthritis of multiple sites, Former smoker, stopped smoking in distant past, Gastritis, GERD (gastroesophageal reflux disease), Glucose intolerance (impaired glucose tolerance), HTN (hypertension), ILD (interstitial lung disease) ([address] Microscopic polyangiitis ([address] MVP (mitral valve prolapse), Near syncope, Orthostatic hypotension, Pseudogout of left wrist, Pulmonary embolus ([address] Pulmonary fibrosis ([address] Pulmonary nodule, Sjogren's disease ([address] Volume depletion, and Wegener's granulomatosis (granulomatosis with polyangiitis). Past Surgical History:  has a past surgical history that includes ECHO Compl W Dop Color Flow (6/4/2015); Kidney biopsy; Total knee arthroplasty (Bilateral); Tonsillectomy; and Ankle fracture surgery (Right, 6/30/2019). Social History:  reports that she quit smoking about 65 years ago. Her smoking use included cigarettes. She started smoking about 74 years ago.  She has a 4.50 pack-year 1.50 - 4.00 E9/L    Monocytes Absolute 0.87 0.10 - 0.95 E9/L    Eosinophils Absolute 0.14 0.05 - 0.50 E9/L    Basophils Absolute 0.06 0.00 - 0.20 E9/L   CMP   Result Value Ref Range    Sodium 140 132 - 146 mmol/L    Potassium 4.0 3.5 - 5.0 mmol/L    Chloride 105 98 - 107 mmol/L    CO2 25 22 - 29 mmol/L    Anion Gap 10 7 - 16 mmol/L    Glucose 84 74 - 99 mg/dL    BUN 14 6 - 23 mg/dL    Creatinine 0.9 0.5 - 1.0 mg/dL    Est, Glom Filt Rate >60 >=60 mL/min/1.73    Calcium 8.3 (L) 8.6 - 10.2 mg/dL    Total Protein 6.0 (L) 6.4 - 8.3 g/dL    Albumin 3.3 (L) 3.5 - 5.2 g/dL    Total Bilirubin 0.8 0.0 - 1.2 mg/dL    Alkaline Phosphatase 59 35 - 104 U/L    ALT 11 0 - 32 U/L    AST 19 0 - 31 U/L   EKG 12 Lead   Result Value Ref Range    Ventricular Rate 67 BPM    Atrial Rate 67 BPM    QRS Duration 84 ms    Q-T Interval 418 ms    QTc Calculation (Bazett) 441 ms    R Axis 34 degrees    T Axis 39 degrees       RADIOLOGY:  Interpreted by Radiologist.  No orders to display       ------------------------- NURSING NOTES AND VITALS REVIEWED ---------------------------   The nursing notes within the ED encounter and vital signs as below have been reviewed. BP (!) 146/74   Pulse 66   Temp 98.6 °F (37 °C) (Oral)   Resp 18   Wt 155 lb (70.3 kg)   SpO2 96%   BMI 25.79 kg/m²   Oxygen Saturation Interpretation: Normal      ---------------------------------------------------PHYSICAL EXAM--------------------------------------      Constitutional/General: Alert and oriented x3, appears uncomfortable, non toxic in NAD  Head: Normocephalic and atraumatic  Eyes: EOMI  Mouth: Oropharynx clear, handling secretions, no trismus  Neck: Supple, full ROM,   Pulmonary: Lungs clear to auscultation bilaterally, no wheezes, rales, or rhonchi.  Not in respiratory distress  Cardiovascular:  Regular rate and rhythm  Abdomen: Soft, non tender, non distended,   Rectal: RN chaperone, Lawerancsam Rashi, at bedside-several hemorrhoids, nonthrombosed, no

## 2023-03-24 LAB
EKG ATRIAL RATE: 67 BPM
EKG Q-T INTERVAL: 418 MS
EKG QRS DURATION: 84 MS
EKG QTC CALCULATION (BAZETT): 441 MS
EKG R AXIS: 34 DEGREES
EKG T AXIS: 39 DEGREES
EKG VENTRICULAR RATE: 67 BPM

## 2023-03-24 PROCEDURE — 93010 ELECTROCARDIOGRAM REPORT: CPT | Performed by: INTERNAL MEDICINE

## 2023-04-21 DIAGNOSIS — N18.31 STAGE 3A CHRONIC KIDNEY DISEASE (HCC): ICD-10-CM

## 2023-04-21 DIAGNOSIS — E78.5 DYSLIPIDEMIA: ICD-10-CM

## 2023-04-21 LAB
ALBUMIN SERPL-MCNC: 3.8 G/DL (ref 3.5–5.2)
ALP SERPL-CCNC: 61 U/L (ref 35–104)
ALT SERPL-CCNC: 14 U/L (ref 0–32)
ANION GAP SERPL CALCULATED.3IONS-SCNC: 12 MMOL/L (ref 7–16)
AST SERPL-CCNC: 24 U/L (ref 0–31)
BASOPHILS # BLD: 0.05 E9/L (ref 0–0.2)
BASOPHILS NFR BLD: 0.7 % (ref 0–2)
BILIRUB SERPL-MCNC: 0.9 MG/DL (ref 0–1.2)
BUN SERPL-MCNC: 19 MG/DL (ref 6–23)
CALCIUM SERPL-MCNC: 9 MG/DL (ref 8.6–10.2)
CHLORIDE SERPL-SCNC: 110 MMOL/L (ref 98–107)
CHOLESTEROL, TOTAL: 129 MG/DL (ref 0–199)
CO2 SERPL-SCNC: 24 MMOL/L (ref 22–29)
CREAT SERPL-MCNC: 1 MG/DL (ref 0.5–1)
EOSINOPHIL # BLD: 0.25 E9/L (ref 0.05–0.5)
EOSINOPHIL NFR BLD: 3.4 % (ref 0–6)
ERYTHROCYTE [DISTWIDTH] IN BLOOD BY AUTOMATED COUNT: 17.2 FL (ref 11.5–15)
GLUCOSE SERPL-MCNC: 88 MG/DL (ref 74–99)
HCT VFR BLD AUTO: 37.8 % (ref 34–48)
HDLC SERPL-MCNC: 68 MG/DL
HGB BLD-MCNC: 12.2 G/DL (ref 11.5–15.5)
IMM GRANULOCYTES # BLD: 0.02 E9/L
IMM GRANULOCYTES NFR BLD: 0.3 % (ref 0–5)
LDLC SERPL CALC-MCNC: 48 MG/DL (ref 0–99)
LYMPHOCYTES # BLD: 2.68 E9/L (ref 1.5–4)
LYMPHOCYTES NFR BLD: 36.5 % (ref 20–42)
MCH RBC QN AUTO: 32.1 PG (ref 26–35)
MCHC RBC AUTO-ENTMCNC: 32.3 % (ref 32–34.5)
MCV RBC AUTO: 99.5 FL (ref 80–99.9)
MONOCYTES # BLD: 0.78 E9/L (ref 0.1–0.95)
MONOCYTES NFR BLD: 10.6 % (ref 2–12)
NEUTROPHILS # BLD: 3.57 E9/L (ref 1.8–7.3)
NEUTS SEG NFR BLD: 48.5 % (ref 43–80)
PLATELET # BLD AUTO: 163 E9/L (ref 130–450)
PMV BLD AUTO: 10.6 FL (ref 7–12)
POTASSIUM SERPL-SCNC: 4.1 MMOL/L (ref 3.5–5)
PROT SERPL-MCNC: 6.3 G/DL (ref 6.4–8.3)
RBC # BLD AUTO: 3.8 E12/L (ref 3.5–5.5)
SODIUM SERPL-SCNC: 146 MMOL/L (ref 132–146)
TRIGL SERPL-MCNC: 66 MG/DL (ref 0–149)
VITAMIN D 25-HYDROXY: 41 NG/ML (ref 30–100)
VLDLC SERPL CALC-MCNC: 13 MG/DL
WBC # BLD: 7.4 E9/L (ref 4.5–11.5)

## 2023-08-18 ENCOUNTER — APPOINTMENT (OUTPATIENT)
Dept: GENERAL RADIOLOGY | Age: 88
End: 2023-08-18
Payer: MEDICARE

## 2023-08-18 ENCOUNTER — APPOINTMENT (OUTPATIENT)
Dept: CT IMAGING | Age: 88
End: 2023-08-18
Payer: MEDICARE

## 2023-08-18 ENCOUNTER — HOSPITAL ENCOUNTER (EMERGENCY)
Age: 88
Discharge: HOME | End: 2023-08-19
Attending: STUDENT IN AN ORGANIZED HEALTH CARE EDUCATION/TRAINING PROGRAM
Payer: MEDICARE

## 2023-08-18 DIAGNOSIS — N30.01 ACUTE CYSTITIS WITH HEMATURIA: ICD-10-CM

## 2023-08-18 DIAGNOSIS — J18.9 COMMUNITY ACQUIRED PNEUMONIA OF RIGHT MIDDLE LOBE OF LUNG: Primary | ICD-10-CM

## 2023-08-18 LAB
ALBUMIN SERPL-MCNC: 3.3 G/DL (ref 3.5–5.2)
ALP SERPL-CCNC: 69 U/L (ref 35–104)
ALT SERPL-CCNC: 14 U/L (ref 0–32)
ANION GAP SERPL CALCULATED.3IONS-SCNC: 8 MMOL/L (ref 7–16)
AST SERPL-CCNC: 21 U/L (ref 0–31)
BACTERIA URNS QL MICRO: ABNORMAL
BASOPHILS # BLD: 0.05 K/UL (ref 0–0.2)
BASOPHILS NFR BLD: 0 % (ref 0–2)
BILIRUB SERPL-MCNC: 1.1 MG/DL (ref 0–1.2)
BILIRUB UR QL STRIP: ABNORMAL
BUN SERPL-MCNC: 13 MG/DL (ref 6–23)
CALCIUM SERPL-MCNC: 8.7 MG/DL (ref 8.6–10.2)
CHLORIDE SERPL-SCNC: 100 MMOL/L (ref 98–107)
CLARITY UR: CLEAR
CO2 SERPL-SCNC: 28 MMOL/L (ref 22–29)
COLOR UR: YELLOW
CORTIS SERPL-MCNC: 7 UG/DL (ref 2.7–18.4)
CORTISOL COLLECTION INFO: NORMAL
CREAT SERPL-MCNC: 1 MG/DL (ref 0.5–1)
EOSINOPHIL # BLD: 0.1 K/UL (ref 0.05–0.5)
EOSINOPHILS RELATIVE PERCENT: 1 % (ref 0–6)
EPI CELLS #/AREA URNS HPF: ABNORMAL /HPF
ERYTHROCYTE [DISTWIDTH] IN BLOOD BY AUTOMATED COUNT: 14.5 % (ref 11.5–15)
GFR SERPL CREATININE-BSD FRML MDRD: 57 ML/MIN/1.73M2
GLUCOSE SERPL-MCNC: 118 MG/DL (ref 74–99)
GLUCOSE UR STRIP-MCNC: NEGATIVE MG/DL
HCT VFR BLD AUTO: 37.4 % (ref 34–48)
HGB BLD-MCNC: 12.5 G/DL (ref 11.5–15.5)
HGB UR QL STRIP.AUTO: ABNORMAL
IMM GRANULOCYTES # BLD AUTO: 0.08 K/UL (ref 0–0.58)
IMM GRANULOCYTES NFR BLD: 1 % (ref 0–5)
KETONES UR STRIP-MCNC: ABNORMAL MG/DL
LACTATE BLDV-SCNC: 1.2 MMOL/L (ref 0.5–2.2)
LEUKOCYTE ESTERASE UR QL STRIP: ABNORMAL
LIPASE SERPL-CCNC: 20 U/L (ref 13–60)
LYMPHOCYTES NFR BLD: 1.67 K/UL (ref 1.5–4)
LYMPHOCYTES RELATIVE PERCENT: 13 % (ref 20–42)
MCH RBC QN AUTO: 31.6 PG (ref 26–35)
MCHC RBC AUTO-ENTMCNC: 33.4 G/DL (ref 32–34.5)
MCV RBC AUTO: 94.4 FL (ref 80–99.9)
MONOCYTES NFR BLD: 1.39 K/UL (ref 0.1–0.95)
MONOCYTES NFR BLD: 11 % (ref 2–12)
MUCOUS THREADS URNS QL MICRO: PRESENT
NEUTROPHILS NFR BLD: 75 % (ref 43–80)
NEUTS SEG NFR BLD: 9.78 K/UL (ref 1.8–7.3)
NITRITE UR QL STRIP: NEGATIVE
PH UR STRIP: 5.5 [PH] (ref 5–9)
PLATELET # BLD AUTO: 213 K/UL (ref 130–450)
PMV BLD AUTO: 9.7 FL (ref 7–12)
POTASSIUM SERPL-SCNC: 4 MMOL/L (ref 3.5–5)
PROT SERPL-MCNC: 6.6 G/DL (ref 6.4–8.3)
PROT UR STRIP-MCNC: ABNORMAL MG/DL
RBC # BLD AUTO: 3.96 M/UL (ref 3.5–5.5)
RBC #/AREA URNS HPF: ABNORMAL /HPF
SARS-COV-2 RDRP RESP QL NAA+PROBE: NOT DETECTED
SODIUM SERPL-SCNC: 136 MMOL/L (ref 132–146)
SP GR UR STRIP: 1.02 (ref 1–1.03)
SPECIMEN DESCRIPTION: NORMAL
TROPONIN I SERPL HS-MCNC: 14 NG/L (ref 0–9)
UROBILINOGEN UR STRIP-ACNC: 0.2 EU/DL (ref 0–1)
WBC #/AREA URNS HPF: ABNORMAL /HPF
WBC OTHER # BLD: 13.1 K/UL (ref 4.5–11.5)

## 2023-08-18 PROCEDURE — 80053 COMPREHEN METABOLIC PANEL: CPT

## 2023-08-18 PROCEDURE — 70450 CT HEAD/BRAIN W/O DYE: CPT

## 2023-08-18 PROCEDURE — 87635 SARS-COV-2 COVID-19 AMP PRB: CPT

## 2023-08-18 PROCEDURE — 72125 CT NECK SPINE W/O DYE: CPT

## 2023-08-18 PROCEDURE — 73130 X-RAY EXAM OF HAND: CPT

## 2023-08-18 PROCEDURE — 71045 X-RAY EXAM CHEST 1 VIEW: CPT

## 2023-08-18 PROCEDURE — 85025 COMPLETE CBC W/AUTO DIFF WBC: CPT

## 2023-08-18 PROCEDURE — 84484 ASSAY OF TROPONIN QUANT: CPT

## 2023-08-18 PROCEDURE — 87086 URINE CULTURE/COLONY COUNT: CPT

## 2023-08-18 PROCEDURE — 93005 ELECTROCARDIOGRAM TRACING: CPT | Performed by: STUDENT IN AN ORGANIZED HEALTH CARE EDUCATION/TRAINING PROGRAM

## 2023-08-18 PROCEDURE — 83690 ASSAY OF LIPASE: CPT

## 2023-08-18 PROCEDURE — 99285 EMERGENCY DEPT VISIT HI MDM: CPT

## 2023-08-18 PROCEDURE — 82533 TOTAL CORTISOL: CPT

## 2023-08-18 PROCEDURE — 83605 ASSAY OF LACTIC ACID: CPT

## 2023-08-18 PROCEDURE — 81001 URINALYSIS AUTO W/SCOPE: CPT

## 2023-08-18 ASSESSMENT — PAIN - FUNCTIONAL ASSESSMENT: PAIN_FUNCTIONAL_ASSESSMENT: NONE - DENIES PAIN

## 2023-08-19 VITALS
OXYGEN SATURATION: 96 % | TEMPERATURE: 99.5 F | SYSTOLIC BLOOD PRESSURE: 95 MMHG | DIASTOLIC BLOOD PRESSURE: 53 MMHG | HEART RATE: 79 BPM | RESPIRATION RATE: 16 BRPM

## 2023-08-19 LAB — TROPONIN I SERPL HS-MCNC: 14 NG/L (ref 0–9)

## 2023-08-19 PROCEDURE — 6370000000 HC RX 637 (ALT 250 FOR IP): Performed by: STUDENT IN AN ORGANIZED HEALTH CARE EDUCATION/TRAINING PROGRAM

## 2023-08-19 RX ORDER — CEFDINIR 300 MG/1
300 CAPSULE ORAL 2 TIMES DAILY
Qty: 14 CAPSULE | Refills: 0 | Status: SHIPPED | OUTPATIENT
Start: 2023-08-19 | End: 2023-08-23

## 2023-08-19 RX ORDER — DOXYCYCLINE HYCLATE 100 MG/1
100 CAPSULE ORAL ONCE
Status: COMPLETED | OUTPATIENT
Start: 2023-08-19 | End: 2023-08-19

## 2023-08-19 RX ORDER — ACETAMINOPHEN 500 MG
1 TABLET ORAL DAILY
Qty: 30 CAPSULE | Refills: 0 | Status: SHIPPED | OUTPATIENT
Start: 2023-08-19 | End: 2023-08-23

## 2023-08-19 RX ORDER — CEFDINIR 300 MG/1
300 CAPSULE ORAL ONCE
Status: COMPLETED | OUTPATIENT
Start: 2023-08-19 | End: 2023-08-19

## 2023-08-19 RX ORDER — DOXYCYCLINE HYCLATE 100 MG
100 TABLET ORAL 2 TIMES DAILY
Qty: 20 TABLET | Refills: 0 | Status: SHIPPED | OUTPATIENT
Start: 2023-08-19 | End: 2023-08-23

## 2023-08-19 RX ADMIN — DOXYCYCLINE 100 MG: 100 CAPSULE ORAL at 01:35

## 2023-08-19 RX ADMIN — CEFDINIR 300 MG: 300 CAPSULE ORAL at 01:35

## 2023-08-19 NOTE — DISCHARGE INSTRUCTIONS
Follow-up with primary care doctor  Take all medication as prescribed  If you notice any new worrisome symptoms please return to emergency department for evaluation

## 2023-08-19 NOTE — ED NOTES
This nurse rounding in er waiting room, Pt aware still waiting for er room/bed to become available. Vital signs rechecked at this time, pt repeat troponin drawn and sent . Pt given pillow for under butt for comfort, voiced having nothing to eat since been here, Rn aware and went to bring pt something to eat and drink. Pt with daughter whom returned her back to er waiting room. Pt voiced really tired wanting to go home, pt aware to waiting for at least results from troponin in which pt is waiting in wr.      Baltazar Diaz LPN  23/12/43 2986

## 2023-08-20 LAB
MICROORGANISM SPEC CULT: ABNORMAL
SPECIMEN DESCRIPTION: ABNORMAL

## 2023-08-21 LAB
EKG ATRIAL RATE: 82 BPM
EKG P AXIS: 47 DEGREES
EKG P-R INTERVAL: 172 MS
EKG Q-T INTERVAL: 386 MS
EKG QRS DURATION: 84 MS
EKG QTC CALCULATION (BAZETT): 450 MS
EKG R AXIS: 43 DEGREES
EKG T AXIS: 1 DEGREES
EKG VENTRICULAR RATE: 82 BPM

## 2023-08-21 PROCEDURE — 93010 ELECTROCARDIOGRAM REPORT: CPT | Performed by: INTERNAL MEDICINE

## 2023-08-21 NOTE — ED PROVIDER NOTES
Debbie      Pt Name: Gay Walters  MRN: [id number]  [address] [facility] [place] 3/3/1932  Date of evaluation: 8/18/2023  Provider: Marcus Richter DO  PCP: Jyoti Chan MD      [address]       Chief Complaint   Patient presents with    Fatigue     Diarrhea, nausea, chills, hypoTN x 3 weeks. (BP 81/49 in triage)    Adam Nathaniel approx 1000. Unwitnessed, unsure if hit head. - thinners. Bruises to right elbow and left wrist       HISTORY OF PRESENT ILLNESS: 1 or more Elements   History From: Patient   Limitations to history : None    Gay Walters is a 80 y.o. female Past medical history of hypertension, mitral valve prolapse, cervical cancer, pulmonary fibrosis as well as Wegener's granulomatosis. Patient presents with chief complaint of fatigue and falls. Patient states that over the last couple weeks she has had intermittent episodes of nausea with chills as well as some nonbloody diarrhea. Patient states that symptoms have been moderate in severity intermittent since onset. Patient notes that due to the fatigue she did have a fall earlier today. Patient denies striking her head she is complaining of some mild pain to her left wrist.  Patient accompanied by her daughter who states that patient has just seemed off for the last couple of days and has not been eating and drinking as much as she should. She states that she checked her blood pressure today and it was lower than normal thus she wanted patient evaluated emergency department. Patient denies any chest pain, abdominal pain, headache, numbness, or tingling. Nursing Notes were all reviewed and agreed with or any disagreements were addressed in the HPI. REVIEW OF SYSTEMS :    Positives and Pertinent negatives as per HPI.      SURGICAL HISTORY     Past Surgical History:   Procedure Laterality Date    ANKLE FRACTURE SURGERY Right 6/30/2019    ANKLE OPEN

## 2023-08-23 ENCOUNTER — APPOINTMENT (OUTPATIENT)
Dept: GENERAL RADIOLOGY | Age: 88
End: 2023-08-23
Payer: MEDICARE

## 2023-08-23 ENCOUNTER — APPOINTMENT (OUTPATIENT)
Dept: CT IMAGING | Age: 88
End: 2023-08-23
Payer: MEDICARE

## 2023-08-23 ENCOUNTER — HOSPITAL ENCOUNTER (OUTPATIENT)
Age: 88
Setting detail: OBSERVATION
Discharge: OTHER INSTITUTION | End: 2023-08-25
Attending: STUDENT IN AN ORGANIZED HEALTH CARE EDUCATION/TRAINING PROGRAM | Admitting: INTERNAL MEDICINE
Payer: MEDICARE

## 2023-08-23 DIAGNOSIS — R53.1 GENERALIZED WEAKNESS: Primary | ICD-10-CM

## 2023-08-23 DIAGNOSIS — N39.0 URINARY TRACT INFECTION IN FEMALE: ICD-10-CM

## 2023-08-23 LAB
ALBUMIN SERPL-MCNC: 3.2 G/DL (ref 3.5–5.2)
ALP SERPL-CCNC: 59 U/L (ref 35–104)
ALT SERPL-CCNC: 19 U/L (ref 0–32)
ANION GAP SERPL CALCULATED.3IONS-SCNC: 11 MMOL/L (ref 7–16)
AST SERPL-CCNC: 22 U/L (ref 0–31)
BACTERIA URNS QL MICRO: ABNORMAL
BASOPHILS # BLD: 0.05 K/UL (ref 0–0.2)
BASOPHILS NFR BLD: 0 % (ref 0–2)
BILIRUB SERPL-MCNC: 1 MG/DL (ref 0–1.2)
BILIRUB UR QL STRIP: NEGATIVE
BNP SERPL-MCNC: 1360 PG/ML (ref 0–450)
BUN SERPL-MCNC: 18 MG/DL (ref 6–23)
CALCIUM SERPL-MCNC: 8.9 MG/DL (ref 8.6–10.2)
CHLORIDE SERPL-SCNC: 101 MMOL/L (ref 98–107)
CK SERPL-CCNC: 23 U/L (ref 20–180)
CLARITY UR: CLEAR
CO2 SERPL-SCNC: 27 MMOL/L (ref 22–29)
COLOR UR: YELLOW
CREAT SERPL-MCNC: 0.8 MG/DL (ref 0.5–1)
EKG ATRIAL RATE: 74 BPM
EKG P AXIS: 59 DEGREES
EKG P-R INTERVAL: 196 MS
EKG Q-T INTERVAL: 386 MS
EKG QRS DURATION: 84 MS
EKG QTC CALCULATION (BAZETT): 428 MS
EKG R AXIS: -22 DEGREES
EKG T AXIS: -16 DEGREES
EKG VENTRICULAR RATE: 74 BPM
EOSINOPHIL # BLD: 0.08 K/UL (ref 0.05–0.5)
EOSINOPHILS RELATIVE PERCENT: 1 % (ref 0–6)
ERYTHROCYTE [DISTWIDTH] IN BLOOD BY AUTOMATED COUNT: 14.4 % (ref 11.5–15)
ERYTHROCYTE [SEDIMENTATION RATE] IN BLOOD BY WESTERGREN METHOD: 52 MM/HR (ref 0–20)
FLUAV RNA RESP QL NAA+PROBE: NOT DETECTED
FLUBV RNA RESP QL NAA+PROBE: NOT DETECTED
GFR SERPL CREATININE-BSD FRML MDRD: >60 ML/MIN/1.73M2
GLUCOSE SERPL-MCNC: 105 MG/DL (ref 74–99)
GLUCOSE UR STRIP-MCNC: NEGATIVE MG/DL
HCT VFR BLD AUTO: 35.2 % (ref 34–48)
HGB BLD-MCNC: 11.7 G/DL (ref 11.5–15.5)
HGB UR QL STRIP.AUTO: ABNORMAL
IMM GRANULOCYTES # BLD AUTO: 0.07 K/UL (ref 0–0.58)
IMM GRANULOCYTES NFR BLD: 1 % (ref 0–5)
KETONES UR STRIP-MCNC: NEGATIVE MG/DL
LEUKOCYTE ESTERASE UR QL STRIP: NEGATIVE
LYMPHOCYTES NFR BLD: 1.55 K/UL (ref 1.5–4)
LYMPHOCYTES RELATIVE PERCENT: 12 % (ref 20–42)
MCH RBC QN AUTO: 31.3 PG (ref 26–35)
MCHC RBC AUTO-ENTMCNC: 33.2 G/DL (ref 32–34.5)
MCV RBC AUTO: 94.1 FL (ref 80–99.9)
MONOCYTES NFR BLD: 1.2 K/UL (ref 0.1–0.95)
MONOCYTES NFR BLD: 10 % (ref 2–12)
NEUTROPHILS NFR BLD: 77 % (ref 43–80)
NEUTS SEG NFR BLD: 9.72 K/UL (ref 1.8–7.3)
NITRITE UR QL STRIP: NEGATIVE
PH UR STRIP: 6 [PH] (ref 5–9)
PLATELET # BLD AUTO: 217 K/UL (ref 130–450)
PMV BLD AUTO: 9.7 FL (ref 7–12)
POTASSIUM SERPL-SCNC: 3.9 MMOL/L (ref 3.5–5)
PROCALCITONIN SERPL-MCNC: 0.12 NG/ML (ref 0–0.08)
PROT SERPL-MCNC: 6.5 G/DL (ref 6.4–8.3)
PROT UR STRIP-MCNC: ABNORMAL MG/DL
RBC # BLD AUTO: 3.74 M/UL (ref 3.5–5.5)
RBC #/AREA URNS HPF: ABNORMAL /HPF
SARS-COV-2 RNA RESP QL NAA+PROBE: NOT DETECTED
SODIUM SERPL-SCNC: 139 MMOL/L (ref 132–146)
SOURCE: NORMAL
SP GR UR STRIP: 1.02 (ref 1–1.03)
SPECIMEN DESCRIPTION: NORMAL
TROPONIN I SERPL HS-MCNC: 16 NG/L (ref 0–9)
TSH SERPL DL<=0.05 MIU/L-ACNC: 0.14 UIU/ML (ref 0.27–4.2)
UROBILINOGEN UR STRIP-ACNC: 0.2 EU/DL (ref 0–1)
WBC #/AREA URNS HPF: ABNORMAL /HPF
WBC OTHER # BLD: 12.7 K/UL (ref 4.5–11.5)

## 2023-08-23 PROCEDURE — 71045 X-RAY EXAM CHEST 1 VIEW: CPT

## 2023-08-23 PROCEDURE — 6360000004 HC RX CONTRAST MEDICATION: Performed by: RADIOLOGY

## 2023-08-23 PROCEDURE — 99285 EMERGENCY DEPT VISIT HI MDM: CPT

## 2023-08-23 PROCEDURE — 84443 ASSAY THYROID STIM HORMONE: CPT

## 2023-08-23 PROCEDURE — 83880 ASSAY OF NATRIURETIC PEPTIDE: CPT

## 2023-08-23 PROCEDURE — G0378 HOSPITAL OBSERVATION PER HR: HCPCS

## 2023-08-23 PROCEDURE — 93010 ELECTROCARDIOGRAM REPORT: CPT | Performed by: INTERNAL MEDICINE

## 2023-08-23 PROCEDURE — 80053 COMPREHEN METABOLIC PANEL: CPT

## 2023-08-23 PROCEDURE — 2580000003 HC RX 258

## 2023-08-23 PROCEDURE — 96367 TX/PROPH/DG ADDL SEQ IV INF: CPT

## 2023-08-23 PROCEDURE — 87086 URINE CULTURE/COLONY COUNT: CPT

## 2023-08-23 PROCEDURE — 74176 CT ABD & PELVIS W/O CONTRAST: CPT

## 2023-08-23 PROCEDURE — 82550 ASSAY OF CK (CPK): CPT

## 2023-08-23 PROCEDURE — 6360000002 HC RX W HCPCS

## 2023-08-23 PROCEDURE — 96365 THER/PROPH/DIAG IV INF INIT: CPT

## 2023-08-23 PROCEDURE — 70450 CT HEAD/BRAIN W/O DYE: CPT

## 2023-08-23 PROCEDURE — 84484 ASSAY OF TROPONIN QUANT: CPT

## 2023-08-23 PROCEDURE — 71275 CT ANGIOGRAPHY CHEST: CPT

## 2023-08-23 PROCEDURE — 85025 COMPLETE CBC W/AUTO DIFF WBC: CPT

## 2023-08-23 PROCEDURE — 87636 SARSCOV2 & INF A&B AMP PRB: CPT

## 2023-08-23 PROCEDURE — 84145 PROCALCITONIN (PCT): CPT

## 2023-08-23 PROCEDURE — 85652 RBC SED RATE AUTOMATED: CPT

## 2023-08-23 PROCEDURE — 81001 URINALYSIS AUTO W/SCOPE: CPT

## 2023-08-23 PROCEDURE — 96368 THER/DIAG CONCURRENT INF: CPT

## 2023-08-23 PROCEDURE — 72125 CT NECK SPINE W/O DYE: CPT

## 2023-08-23 PROCEDURE — 93005 ELECTROCARDIOGRAM TRACING: CPT

## 2023-08-23 RX ORDER — CEFDINIR 300 MG/1
300 CAPSULE ORAL 2 TIMES DAILY
Status: ON HOLD | COMMUNITY
Start: 2023-08-22 | End: 2023-08-25 | Stop reason: SDUPTHER

## 2023-08-23 RX ORDER — CITALOPRAM 20 MG/1
20 TABLET ORAL DAILY
COMMUNITY

## 2023-08-23 RX ORDER — ATORVASTATIN CALCIUM 20 MG/1
20 TABLET, FILM COATED ORAL DAILY
COMMUNITY

## 2023-08-23 RX ORDER — PANTOPRAZOLE SODIUM 40 MG/1
40 TABLET, DELAYED RELEASE ORAL DAILY
COMMUNITY

## 2023-08-23 RX ORDER — DOXYCYCLINE HYCLATE 100 MG
100 TABLET ORAL 2 TIMES DAILY
Status: ON HOLD | COMMUNITY
Start: 2023-08-22 | End: 2023-08-25 | Stop reason: HOSPADM

## 2023-08-23 RX ORDER — DONEPEZIL HYDROCHLORIDE 5 MG/1
5 TABLET, FILM COATED ORAL NIGHTLY
COMMUNITY

## 2023-08-23 RX ORDER — ALUMINUM ZIRCONIUM OCTACHLOROHYDREX GLY 16 G/100G
1 GEL TOPICAL DAILY
COMMUNITY

## 2023-08-23 RX ADMIN — CEFEPIME 2000 MG: 2 INJECTION, POWDER, FOR SOLUTION INTRAVENOUS at 16:48

## 2023-08-23 RX ADMIN — VANCOMYCIN HYDROCHLORIDE 1250 MG: 10 INJECTION, POWDER, LYOPHILIZED, FOR SOLUTION INTRAVENOUS at 17:34

## 2023-08-23 RX ADMIN — IOPAMIDOL 75 ML: 755 INJECTION, SOLUTION INTRAVENOUS at 18:17

## 2023-08-23 NOTE — ED PROVIDER NOTES
[facility]        Pt Name: Jen Parra  MRN: [id number]  [facility] 3/3/1932  Date of evaluation: 8/23/2023  Provider: Emiliana Morales DO  PCP: Rebecca Nguyen MD  Note Started: 2:51 PM EDT 8/23/23    CHIEF COMPLAINT       Chief Complaint   Patient presents with    Fatigue     Patient states she was d/c from hospital Saturday, dx with pneumonia; she is very weak today, almost fell when waling to the fridge       HISTORY OF PRESENT ILLNESS: 1 or more Elements   History From: Patient      Jen Parra is a 80 y.o. female who presents to the emergency department for concerns of fatigue. Patient states she was recently diagnosed with pneumonia. Patient states since then she has been extremely fatigued. Patient states she has not had any cough with this pneumonia. Patient has been taking her medications. Patient states she has lowered herself down due to her weakness. Patient denies any ground-level falls. Patient has not had any loss of consciousness associated with this. Patient denies any fever, chest pain, shortness of breath, nausea, vomiting, abdominal pain, dysuria, cough  Patient confirms fatigue, chills          Nursing Notes were all reviewed and agreed with or any disagreements were addressed in the HPI. REVIEW OF SYSTEMS :      Positives and Pertinent negatives as per HPI.      SURGICAL HISTORY     Past Surgical History:   Procedure Laterality Date    ANKLE FRACTURE SURGERY Right 6/30/2019    ANKLE OPEN REDUCTION INTERNAL FIXATION performed by Loreto Montanez MD at [address] [place] [address]    ECHO COMPL W DOP COLOR FLOW  6/4/2015         [facility]    TONSILLECTOMY      TOTAL KNEE ARTHROPLASTY Bilateral     matthew       CURRENTMEDICATIONS       Current Discharge Medication List        CONTINUE these medications which have NOT CHANGED    Details   atorvastatin (LIPITOR) 20 MG tablet Take 1 tablet by mouth daily      cefdinir (OMNICEF) 300 MG capsule Take 1 capsule by mouth 2 times daily      citalopram (CELEXA) 20 MG tablet Take 1 tablet by mouth daily      donepezil (ARICEPT) 5 MG tablet Take 1 tablet by mouth nightly      doxycycline hyclate (VIBRA-TABS) 100 MG tablet Take 1 tablet by mouth 2 times daily      pantoprazole (PROTONIX) 40 MG tablet Take 1 tablet by mouth daily      Probiotic Product (ACIDOPHILUS PROBIOTIC) CAPS capsule Take 1 capsule by mouth daily      docusate sodium (COLACE) 100 MG capsule Take 1 capsule by mouth nightly      predniSONE (DELTASONE) 5 MG tablet Take 1 tablet by mouth daily  Qty: 30 tablet, Refills: 1      Potassium 99 MG TABS Take 1 tablet by mouth daily      acetaminophen (TYLENOL) 500 MG tablet Take 2 tablets by mouth at bedtime      aspirin 81 MG chewable tablet Take 1 tablet by mouth in the morning. Qty: 30 tablet, Refills: 3      Vitamin D (CHOLECALCIFEROL) 25 MCG (1000 UT) TABS tablet Take 1 tablet by mouth daily    Comments: Labeling may look different. 25 mcg=1000 Units. Please double check dosages.       Multiple Vitamins-Minerals (MULTI VITAMIN/MINERALS) TABS Take 1 tablet by mouth daily             ALLERGIES     Codeine and Penicillins    FAMILYHISTORY       Family History   Problem Relation Age of Onset    Cancer Mother     Stroke Father     Breast Cancer Sister     Other Brother         brain tumor    Breast Cancer Sister         SOCIAL HISTORY       Social History     Tobacco Use    Smoking status: Former     Packs/day: 0.50     Years: 9.00     Pack years: 4.50     Types: Cigarettes     Start date: 10/23/1948     Quit date: 10/23/1957     Years since quittin.8    Smokeless tobacco: Never   Vaping Use    Vaping Use: Never used   Substance Use Topics    Alcohol use: Yes     Comment: social    Drug use: No       SCREENINGS        Franklin Coma Scale  Eye Opening: Spontaneous  Best Verbal Response: Oriented  Best Motor Response: Obeys commands  Franklin Coma Scale Score: 15                CIWA Assessment  BP: (!)

## 2023-08-24 LAB
B PARAP IS1001 DNA NPH QL NAA+NON-PROBE: NOT DETECTED
B PERT DNA SPEC QL NAA+PROBE: NOT DETECTED
C PNEUM DNA NPH QL NAA+NON-PROBE: NOT DETECTED
FLUAV RNA NPH QL NAA+NON-PROBE: NOT DETECTED
FLUBV RNA NPH QL NAA+NON-PROBE: NOT DETECTED
HADV DNA NPH QL NAA+NON-PROBE: NOT DETECTED
HCOV 229E RNA NPH QL NAA+NON-PROBE: NOT DETECTED
HCOV HKU1 RNA NPH QL NAA+NON-PROBE: NOT DETECTED
HCOV NL63 RNA NPH QL NAA+NON-PROBE: NOT DETECTED
HCOV OC43 RNA NPH QL NAA+NON-PROBE: NOT DETECTED
HMPV RNA NPH QL NAA+NON-PROBE: NOT DETECTED
HPIV1 RNA NPH QL NAA+NON-PROBE: NOT DETECTED
HPIV2 RNA NPH QL NAA+NON-PROBE: NOT DETECTED
HPIV3 RNA NPH QL NAA+NON-PROBE: NOT DETECTED
HPIV4 RNA NPH QL NAA+NON-PROBE: NOT DETECTED
M PNEUMO DNA NPH QL NAA+NON-PROBE: NOT DETECTED
MICROORGANISM SPEC CULT: NO GROWTH
PROCALCITONIN SERPL-MCNC: 0.2 NG/ML (ref 0–0.08)
RSV RNA NPH QL NAA+NON-PROBE: NOT DETECTED
RV+EV RNA NPH QL NAA+NON-PROBE: NOT DETECTED
SARS-COV-2 RNA NPH QL NAA+NON-PROBE: NOT DETECTED
SPECIMEN DESCRIPTION: NORMAL
SPECIMEN DESCRIPTION: NORMAL

## 2023-08-24 PROCEDURE — 0202U NFCT DS 22 TRGT SARS-COV-2: CPT

## 2023-08-24 PROCEDURE — 6370000000 HC RX 637 (ALT 250 FOR IP): Performed by: INTERNAL MEDICINE

## 2023-08-24 PROCEDURE — 2580000003 HC RX 258

## 2023-08-24 PROCEDURE — 84145 PROCALCITONIN (PCT): CPT

## 2023-08-24 PROCEDURE — 97535 SELF CARE MNGMENT TRAINING: CPT

## 2023-08-24 PROCEDURE — 97530 THERAPEUTIC ACTIVITIES: CPT

## 2023-08-24 PROCEDURE — G0378 HOSPITAL OBSERVATION PER HR: HCPCS

## 2023-08-24 PROCEDURE — 6360000002 HC RX W HCPCS

## 2023-08-24 PROCEDURE — 97165 OT EVAL LOW COMPLEX 30 MIN: CPT

## 2023-08-24 PROCEDURE — 96366 THER/PROPH/DIAG IV INF ADDON: CPT

## 2023-08-24 PROCEDURE — 97161 PT EVAL LOW COMPLEX 20 MIN: CPT

## 2023-08-24 RX ORDER — CITALOPRAM 20 MG/1
20 TABLET ORAL DAILY
Status: DISCONTINUED | OUTPATIENT
Start: 2023-08-24 | End: 2023-08-25 | Stop reason: HOSPADM

## 2023-08-24 RX ORDER — M-VIT,TX,IRON,MINS/CALC/FOLIC 27MG-0.4MG
1 TABLET ORAL DAILY
Status: DISCONTINUED | OUTPATIENT
Start: 2023-08-24 | End: 2023-08-25 | Stop reason: HOSPADM

## 2023-08-24 RX ORDER — ACETAMINOPHEN 325 MG/1
650 TABLET ORAL EVERY 6 HOURS PRN
Status: DISCONTINUED | OUTPATIENT
Start: 2023-08-24 | End: 2023-08-25 | Stop reason: HOSPADM

## 2023-08-24 RX ORDER — PREDNISONE 5 MG/1
5 TABLET ORAL DAILY
Status: DISCONTINUED | OUTPATIENT
Start: 2023-08-24 | End: 2023-08-25

## 2023-08-24 RX ORDER — PANTOPRAZOLE SODIUM 40 MG/1
40 TABLET, DELAYED RELEASE ORAL
Status: DISCONTINUED | OUTPATIENT
Start: 2023-08-24 | End: 2023-08-25 | Stop reason: HOSPADM

## 2023-08-24 RX ORDER — ATORVASTATIN CALCIUM 20 MG/1
20 TABLET, FILM COATED ORAL DAILY
Status: DISCONTINUED | OUTPATIENT
Start: 2023-08-24 | End: 2023-08-25 | Stop reason: HOSPADM

## 2023-08-24 RX ORDER — DONEPEZIL HYDROCHLORIDE 5 MG/1
5 TABLET, FILM COATED ORAL NIGHTLY
Status: DISCONTINUED | OUTPATIENT
Start: 2023-08-24 | End: 2023-08-25 | Stop reason: HOSPADM

## 2023-08-24 RX ORDER — ASPIRIN 81 MG/1
81 TABLET, CHEWABLE ORAL DAILY
Status: DISCONTINUED | OUTPATIENT
Start: 2023-08-24 | End: 2023-08-25 | Stop reason: HOSPADM

## 2023-08-24 RX ORDER — DOCUSATE SODIUM 100 MG/1
100 CAPSULE, LIQUID FILLED ORAL NIGHTLY
Status: DISCONTINUED | OUTPATIENT
Start: 2023-08-24 | End: 2023-08-25 | Stop reason: HOSPADM

## 2023-08-24 RX ORDER — VITAMIN B COMPLEX
1000 TABLET ORAL DAILY
Status: DISCONTINUED | OUTPATIENT
Start: 2023-08-24 | End: 2023-08-25 | Stop reason: HOSPADM

## 2023-08-24 RX ADMIN — ACETAMINOPHEN 650 MG: 325 TABLET ORAL at 22:25

## 2023-08-24 RX ADMIN — MULTIPLE VITAMINS W/ MINERALS TAB 1 TABLET: TAB at 12:57

## 2023-08-24 RX ADMIN — ASPIRIN 81 MG 81 MG: 81 TABLET ORAL at 12:57

## 2023-08-24 RX ADMIN — PREDNISONE 5 MG: 5 TABLET ORAL at 16:41

## 2023-08-24 RX ADMIN — PANTOPRAZOLE SODIUM 40 MG: 40 TABLET, DELAYED RELEASE ORAL at 07:37

## 2023-08-24 RX ADMIN — DONEPEZIL HYDROCHLORIDE 5 MG: 5 TABLET, FILM COATED ORAL at 22:25

## 2023-08-24 RX ADMIN — CEFEPIME 2000 MG: 2 INJECTION, POWDER, FOR SOLUTION INTRAVENOUS at 03:27

## 2023-08-24 RX ADMIN — ACETAMINOPHEN 650 MG: 325 TABLET ORAL at 05:49

## 2023-08-24 RX ADMIN — Medication 1000 UNITS: at 12:57

## 2023-08-24 RX ADMIN — CITALOPRAM HYDROBROMIDE 20 MG: 20 TABLET ORAL at 12:57

## 2023-08-24 RX ADMIN — ACETAMINOPHEN 650 MG: 325 TABLET ORAL at 16:41

## 2023-08-24 RX ADMIN — CEFEPIME 2000 MG: 2 INJECTION, POWDER, FOR SOLUTION INTRAVENOUS at 18:23

## 2023-08-24 RX ADMIN — DOCUSATE SODIUM 100 MG: 100 CAPSULE, LIQUID FILLED ORAL at 22:25

## 2023-08-24 RX ADMIN — ATORVASTATIN CALCIUM 20 MG: 20 TABLET, FILM COATED ORAL at 12:57

## 2023-08-24 ASSESSMENT — PAIN SCALES - GENERAL
PAINLEVEL_OUTOF10: 0
PAINLEVEL_OUTOF10: 9

## 2023-08-24 NOTE — CARE COORDINATION
08/24/23 CM update:  Spoke with pt daughter Elva Garcia first choice is [facility] where mother had [facility] prior. Referral made to Juan Miguel from [facility]. Advised that PT/OT was pending but states she will review and look at chart.  Electronically signed by SHLOMO Braga 8/24/2023 at 2:16 PM

## 2023-08-24 NOTE — H&P
[facility] Internal Medicine  History and Physical      CHIEF COMPLAINT: Weakness    Reason for Admission: Weakness, chills, dehydration    History Obtained From: Patient    PCP :  Sandhya Moseley MD    [address] [place] [address], [facility] [address] / [facility] [address]      HISTORY OF PRESENT ILLNESS:      The patient is a 80 y.o. female presents to the emergency room because of fatigue. She was in the emergency room approximately 5 days prior. He was felt to have a respiratory illness. Patient was given empiric antibiotics. Patient reports that she continues to have chills. No fevers. There was no shortness of breath or productive cough. She continued to worsen with weakness. She then presented to the emergency room. ED evaluation was basically unremarkable. Procalcitonin was low. She was admitted because of progressive fatigue. Chest x-ray question right middle lobe infiltrative process. Patient then underwent CTA of the chest.  This did not reveal any infiltrative process. CT scan of the abdomen was also performed showing no acute pathology. Patient does have underlying history of granulomatosis with polyangiitis.     Past Medical History:        Diagnosis Date    FABIO (acute kidney injury) ([address]) 6/17/2015    Cervical cancer ([address]) 6/3/2015    1970    Chest pain 6/3/2015    Chronic renal impairment, stage 2 (mild) 9/30/2015    Erosive osteoarthritis of multiple sites 6/3/2015    2005    Former smoker, stopped smoking in distant past     Gastritis 6/3/2015    GERD (gastroesophageal reflux disease) 6/3/2015    Glucose intolerance (impaired glucose tolerance) 6/3/2015    2004    HTN (hypertension) 6/3/2015    ILD (interstitial lung disease) ([address]) 6/9/2015    Microscopic polyangiitis ([address]) 6/16/2015    MVP (mitral valve prolapse) 6/3/2015    1982    Near syncope 6/3/2015    Orthostatic hypotension 6/4/2015    Pseudogout of left wrist 6/3/2015    2005    Pulmonary embolus ([address]) 9/30/2015    BILATERAL    Pulmonary Value Ref Range    Procalcitonin 0.12 (H) 0.00 - 0.08 ng/mL   Culture, Urine    Collection Time: 08/23/23  2:08 PM    Specimen: Urine, clean catch   Result Value Ref Range    Specimen Description . CLEAN CATCH URINE     Culture NO GROWTH    EKG 12 Lead    Collection Time: 08/23/23  2:39 PM   Result Value Ref Range    Ventricular Rate 74 BPM    Atrial Rate 74 BPM    P-R Interval 196 ms    QRS Duration 84 ms    Q-T Interval 386 ms    QTc Calculation (Bazett) 428 ms    P Axis 59 degrees    R Axis -22 degrees    T Axis -16 degrees   Procalcitonin    Collection Time: 08/24/23  5:46 AM   Result Value Ref Range    Procalcitonin 0.20 (H) 0.00 - 0.08 ng/mL       CT ABDOMEN PELVIS WO CONTRAST Additional Contrast? None   Final Result   1. No indication for renal calculus or obstructive uropathy for the right   left kidneys. Unremarkable appearance for the bladder. 2.  No indication for acute intraperitoneal retroperitoneal process in the   abdomen or in the pelvis. CTA PULMONARY W CONTRAST   Final Result   No evidence of pulmonary embolism or acute pulmonary abnormality. Peripheral predominant reticulations, suggesting underlying interstitial lung   disease. CT HEAD WO CONTRAST   Final Result   No acute intracranial abnormality. CT CERVICAL SPINE WO CONTRAST   Final Result   No acute abnormality of the cervical spine. Multilevel degenerative changes with grade 1 anterolisthesis C7 over T1. XR CHEST PORTABLE   Final Result   Increased interstitial markings and subtle focal opacities in the right lung,   progressed since prior study. Follow-up study until resolution recommended. ASSESSMENT :      Principal Problem:    Generalized weakness  Resolved Problems:    * No resolved hospital problems.  *    Granulomatosis with polyangiitis  History of PE  Hypertension  GERD  Sjogren's syndrome  Interstitial lung disease      Plan :    Trend procalcitonin  Check respiratory

## 2023-08-24 NOTE — CARE COORDINATION
08/24/23 transition of care: Met with pt and daughter Tanika Daugherty ([phone]) at bedside. Pt lives with  in first floor condo with one step to enter. Pt was independent with ADLs and was driving PTA. Pt has BSC, elevated TS, [employer]. PT/OT has been ordered. Tanika Daugherty requests that pt discharge to [facility] for rehab as pt has had increasing weakness. Choices given for [facility]. Tanika Daugherty is requesting [facility] in [place] that accepts [facility]/Medicare. Pt was previously at [facility]. Tanika Daugherty wants to talk with her sister Cristel Yates before giving choices. PCP is Catalino Henley in [place]. Electronically signed by Margarita Mackey RN  on 8/24/2023 at 1:10 PM       Case Management Assessment  Initial Evaluation    Date/Time of Evaluation: 8/24/2023 1:09 PM  Assessment Completed by: Margarita Mackey RN    If patient is discharged prior to next notation, then this note serves as note for discharge by case management. Patient Name: Cirilo Morales                   YOB: 1932  Diagnosis: Generalized weakness [R53.1]                   Date / Time: 8/23/2023  1:09 PM    Patient Admission Status: Observation   Readmission Risk (Low < 19, Mod (19-27), High > 27): No data recorded  Current PCP: Elidia Valero MD  PCP verified by ? Yes    Chart Reviewed: Yes      History Provided by: Child/Family  Patient Orientation: Alert and Oriented    Patient Cognition: Alert    Hospitalization in the last 30 days (Readmission):  No    If yes, Readmission Assessment in  Navigator will be completed. Advance Directives:      Code Status: Prior   Patient's Primary Decision Maker is: Legal Next of Kin    Primary Decision MakerMedward Kerr Spouse - [phone]    Primary Decision Maker: Ary Kerr - Child - [phone]    Secondary Decision Maker: Claudia Chong Child - [phone]    Secondary Decision Maker: Erna Rushing - Child - [phone]    Supplemental (Other) Decision Maker:  Saurabh [facility]

## 2023-08-24 NOTE — ED NOTES
Report called to floor and patient placed in transport to room 1452h     Mary Carmen Davila RN  08/24/23 2749

## 2023-08-24 NOTE — PROGRESS NOTES
OCCUPATIONAL THERAPY INITIAL EVALUATION    HILARY Rasmusseniris  [facility]   [address] [place], [place]        Date:2023                                                  Patient Name: Frances Chen    MRN: [id number]    : 3/3/1932    Room: [address] [place] [address]      Evaluating OT: Kenny Junior OTR/L; [id number]      Referring Provider: Rossi Gu MD    Specific Provider Orders/Date: OT Eval and Treat 23      Diagnosis:  Generalized weakness    Surgery: noen this admission     Pertinent Medical History:  has a past medical history of FABIO (acute kidney injury) ([address]), Cervical cancer ([address]), Chest pain, Chronic renal impairment, stage 2 (mild), Erosive osteoarthritis of multiple sites, Former smoker, stopped smoking in distant past, Gastritis, GERD (gastroesophageal reflux disease), Glucose intolerance (impaired glucose tolerance), HTN (hypertension), ILD (interstitial lung disease) ([address]), Microscopic polyangiitis ([address]), MVP (mitral valve prolapse), Near syncope, Orthostatic hypotension, Pseudogout of left wrist, Pulmonary embolus ([address]), Pulmonary fibrosis ([address]), Pulmonary nodule, Sjogren's disease ([address]), Volume depletion, and Wegener's granulomatosis (granulomatosis with polyangiitis).       Reason for Admission: fatigue and recently in ED 5 days ago (during session pt daughter reports recent fall at home)    Recommended Adaptive Equipment:  TBD      Precautions:  Fall Risk, Pure Wick     Assessment of current deficits:    [x] Functional mobility  [x]ADLs  [x] Strength               [x]Cognition    [x] Functional transfers   [x] IADLs         [x] Safety Awareness   [x]Endurance    [x] Fine Coordination              [x] Balance      [] Vision/perception   []Sensation     []Gross Motor Coordination  [] ROM  [] Delirium                   [] Motor Control     OT PLAN OF CARE   OT POC based on physician orders, patient diagnosis and results of clinical hard copy, drawn during this pregnancy

## 2023-08-24 NOTE — PROGRESS NOTES
Physical Therapy  Physical Therapy Initial Assessment     Name: Ml Cade  : 3/3/1932  MRN: [id number]      Date of Service: 2023    Evaluating PT:  Tabitha Ku, PT, DPT    Room #:  2416/1711-C  Diagnosis:  Generalized weakness [R53.1]  PMHx/PSHx:  GERD, Sjogren's disease, cervical CA, TIA, HTN, CKD III  Procedure/Surgery:  N/A  Precautions:  fall risk, monitor vitals  Equipment Owned: rollator, cane  Equipment Needs: TBD    SUBJECTIVE:    Pt lives with spouse in a 1 story home with no steps to enter. Bed/bath is on 1st floor. Pt ambulated with rollator PTA. OBJECTIVE:   Initial Evaluation  Date: 23 Treatment Short Term/ Long Term   Goals   AM-PAC 6 Clicks 97/78     Was pt agreeable to Eval/treatment? yes     Does pt have pain?  8/10 RUE/hands/feet     Bed Mobility  Rolling: NT  Supine to sit: SBA  Sit to supine: NT  Scooting: SBA  Rolling: mod I  Supine to sit: mod I  Sit to supine: mod I  Scooting: mod I   Transfers Sit to stand: min A (from bed), mod A (from chair)  Stand to sit: SBA  Stand pivot: min A  Sit to stand: mod I  Stand to sit: mod I  Stand pivot: mod I with AAD   Ambulation    2' with ww min A  100'+ with AAD mod I   Stair negotiation: ascended and descended  NT       Strength/ROM:   BLE grossly 4/5  BLE AROM WFL    Balance:   Static Sitting: SBA  Dynamic Sitting: CGA  Static Standing: CGA with ww  Dynamic Standing: min A with ww    Pt is A & O x 3  Sensation:  Pt denies numbness and tingling to extremities  Edema:  unremarkable    Vitals:  SpO2 and HR were stable during session  BP in supine: 133/65  BP in sitting EOB: 120/65, 114/64  BP sitting in bedside chair: 104/59    Therapeutic Exercises:    Bed mobility: supine<>sit, cued for EOB positioning  Transfers: STS x3, stand pivot x1, cued for hand placement and postural correction  Ambulation: 2' with ww  BLE AROM    Patient education  Pt educated on role of PT, importance of functional mobility during hospital stay, safety with functional mobility    Patient response to education:   Pt verbalized understanding Pt demonstrated skill Pt requires further education in this area   yes partial yes     ASSESSMENT:    Conditions Requiring Skilled Therapeutic Intervention:    [x]Decreased strength     []Decreased ROM  [x]Decreased functional mobility  [x]Decreased balance   [x]Decreased endurance   [x]Decreased posture  []Decreased sensation  []Decreased coordination   []Decreased vision  []Decreased safety awareness   [x]Increased pain       Comments:  Pt supine in bed upon entering, agreeable to participate. Pt's BP taken throughout, detailed above, mild dizziness reported with standing. Pt was assisted to EOB and transferred to bedside chair, demonstrating decreased tolerance to advancing BLE, c/o B feet pain. Pt was assisted with eccentric control to chair. Pt was assisted into standing from chair, requiring increased assistance, cues for sequencing provided. Pt stood with fair balance and flexed posture with ww. Pt was position back in bedside chair, nursing aide present, call bell in reach and all needs met prior to exiting. Spouse and dtr present. Treatment:  Patient practiced and was instructed in the following treatment:    Bed mobility training - pt given verbal and tactile cues to facilitate proper sequencing and safety during rolling and supine>sit as well as provided with physical assistance to complete task   STS and pivot transfer training - pt educated on proper hand and foot placement, safety and sequencing, and use of verbal and tactile cues to safely complete sit<>stand and pivot transfers with physical assistance to complete task safely   Skilled positioning - Pt positioned in chair utilized to facilitate upright posture, joint and skin integrity, and interaction with environment. Pt's/ family goals   1. BRICE    Prognosis is fair for reaching above PT goals.     Patient and or family understand(s) diagnosis,

## 2023-08-24 NOTE — ACP (ADVANCE CARE PLANNING)
Advance Care Planning   Healthcare Decision Maker:    Primary Decision Maker: Sheela Combs - [phone]    Primary Decision Maker: Shonda Valdez - Child - [phone]    Secondary Decision Maker: Allen Styles - Child - [phone]    Secondary Decision Maker: Naz Walker - Child - [phone]    Supplemental (Other) Decision Maker: Dulce Maria Lucerowaldemarparesh - Child - [phone]    Click here to complete Healthcare Decision Makers including selection of the Healthcare Decision Maker Relationship (ie \"Primary\").           Electronically signed by Carol Wilde RN CM on 8/24/2023 at 1:05 PM

## 2023-08-24 NOTE — CARE COORDINATION
08/24/23 Update CM Note; Therapy notified at [phone] of need for therapy evaluations.  Electronically signed by Caitlin Sanchez RN CM on 8/24/2023 at 2:43 PM

## 2023-08-25 VITALS
HEIGHT: 65 IN | DIASTOLIC BLOOD PRESSURE: 66 MMHG | WEIGHT: 140 LBS | OXYGEN SATURATION: 96 % | BODY MASS INDEX: 23.32 KG/M2 | RESPIRATION RATE: 16 BRPM | SYSTOLIC BLOOD PRESSURE: 110 MMHG | HEART RATE: 75 BPM | TEMPERATURE: 97.8 F

## 2023-08-25 LAB — PROCALCITONIN SERPL-MCNC: 0.31 NG/ML (ref 0–0.08)

## 2023-08-25 PROCEDURE — 6370000000 HC RX 637 (ALT 250 FOR IP): Performed by: INTERNAL MEDICINE

## 2023-08-25 PROCEDURE — G0378 HOSPITAL OBSERVATION PER HR: HCPCS

## 2023-08-25 PROCEDURE — 2580000003 HC RX 258

## 2023-08-25 PROCEDURE — 36415 COLL VENOUS BLD VENIPUNCTURE: CPT

## 2023-08-25 PROCEDURE — 6360000002 HC RX W HCPCS

## 2023-08-25 PROCEDURE — 96366 THER/PROPH/DIAG IV INF ADDON: CPT

## 2023-08-25 PROCEDURE — 84145 PROCALCITONIN (PCT): CPT

## 2023-08-25 RX ORDER — CEFDINIR 300 MG/1
300 CAPSULE ORAL 2 TIMES DAILY
Qty: 14 CAPSULE | Refills: 0
Start: 2023-08-25 | End: 2023-09-01

## 2023-08-25 RX ORDER — PREDNISONE 20 MG/1
10 TABLET ORAL DAILY
Status: DISCONTINUED | OUTPATIENT
Start: 2023-08-26 | End: 2023-08-25 | Stop reason: HOSPADM

## 2023-08-25 RX ORDER — PREDNISONE 10 MG/1
10 TABLET ORAL DAILY
Qty: 10 TABLET | Refills: 0
Start: 2023-08-26 | End: 2023-09-05

## 2023-08-25 RX ADMIN — ASPIRIN 81 MG 81 MG: 81 TABLET ORAL at 08:55

## 2023-08-25 RX ADMIN — PANTOPRAZOLE SODIUM 40 MG: 40 TABLET, DELAYED RELEASE ORAL at 07:04

## 2023-08-25 RX ADMIN — Medication 1000 UNITS: at 08:56

## 2023-08-25 RX ADMIN — MULTIPLE VITAMINS W/ MINERALS TAB 1 TABLET: TAB at 08:55

## 2023-08-25 RX ADMIN — ACETAMINOPHEN 650 MG: 325 TABLET ORAL at 09:05

## 2023-08-25 RX ADMIN — CEFEPIME 2000 MG: 2 INJECTION, POWDER, FOR SOLUTION INTRAVENOUS at 03:44

## 2023-08-25 RX ADMIN — CITALOPRAM HYDROBROMIDE 20 MG: 20 TABLET ORAL at 08:56

## 2023-08-25 RX ADMIN — PREDNISONE 5 MG: 5 TABLET ORAL at 08:56

## 2023-08-25 ASSESSMENT — PAIN DESCRIPTION - ORIENTATION: ORIENTATION: RIGHT;LEFT

## 2023-08-25 ASSESSMENT — PAIN DESCRIPTION - LOCATION: LOCATION: HAND;LEG

## 2023-08-25 ASSESSMENT — PAIN DESCRIPTION - DESCRIPTORS: DESCRIPTORS: ACHING;DULL;SORE

## 2023-08-25 ASSESSMENT — PAIN SCALES - GENERAL: PAINLEVEL_OUTOF10: 3

## 2023-08-25 NOTE — PROGRESS NOTES
perfect serve call placed to  re: the patient is hypotensive b/p 97/51 then manual 100/58. Physician is aware. No new order given to this nurse via telephone. Will continue to monitor.

## 2023-08-25 NOTE — CARE COORDINATION
08/25/23 Update CM Note: patient approved for [facility] Milvia Greenwood. Dr Nolan Ames notified via perfect serve of above information. Passar/lyle/destination completed. Envelope in soft chart.  Electronically signed by Amrik Rodrigez RN CM on 8/25/2023 at 9:15 AM

## 2023-08-25 NOTE — DISCHARGE INSTR - COC
Continuity of Care Form    Patient Name: Indira Barnett   :  3/3/1932  MRN:  [id number]    Admit date:  2023  Discharge date:  ***    Code Status Order: Prior   Advance Directives:     Admitting Physician:  Stiven Dobson MD  PCP: Stiven Dobson MD    Discharging Nurse: [facility] Unit/Room#: 8210/8210-A  Discharging Unit Phone Number: ***    Emergency Contact:   Extended Emergency Contact Information  Primary Emergency Contact: Robert Norwood  Address: [address] [place] [address] 13 [facility], [address] [place] [address] [place] Mar of [address] [address] Phone: [phone]  Relation: Spouse  Secondary Emergency Contact: Favian Guzmán of [address] [address] Phone: [phone]  Mobile Phone: [phone]  Relation: Child    Past Surgical History:  Past Surgical History:   Procedure Laterality Date    ANKLE FRACTURE SURGERY Right 2019    ANKLE OPEN REDUCTION INTERNAL FIXATION performed by Sam Vu MD at [address]    ECHO COMPL W DOP COLOR FLOW  2015         KIDNEY BIOPSY      [facility]    TONSILLECTOMY      TOTAL KNEE ARTHROPLASTY Bilateral     matthew       Immunization History:   Immunization History   Administered Date(s) Administered    COVID-19, MODERNA BLUE border, Primary or Immunocompromised, (age 12y+), IM, 100 mcg/0.5mL 2021    COVID-19, MODERNA Bivalent, (age 12y+), IM, 50 mcg/0.5 mL 2022    COVID-19, PFIZER PURPLE top, DILUTE for use, (age 15 y+), 30mcg/0.3mL 2021, 2021, 2021, 2021    Influenza Virus Vaccine 10/18/2007    Influenza Whole 10/19/2015    Influenza, FLUAD, (age 72 y+), Adjuvanted, 0.5mL 2021, 2021, 10/11/2022    Influenza, FLUARIX, FLULAVAL, FLUZONE (age 10 mo+) AND AFLURIA, (age 1 y+), PF, 0.5mL 10/25/2016    Influenza, FLUZONE (age 72 y+), High Dose, 0.7mL 10/12/2020    Influenza, High Dose (Fluzone 65 yrs and older) 10/13/2014, 10/10/2015, 2017, 10/25/2018, 2019, 2019, 10/12/2020    Influenza,

## 2023-09-06 ENCOUNTER — APPOINTMENT (OUTPATIENT)
Dept: GENERAL RADIOLOGY | Age: 88
End: 2023-09-06
Payer: MEDICARE

## 2023-09-06 ENCOUNTER — TELEPHONE (OUTPATIENT)
Dept: OTHER | Facility: CLINIC | Age: 88
End: 2023-09-06

## 2023-09-06 ENCOUNTER — HOSPITAL ENCOUNTER (EMERGENCY)
Age: 88
Discharge: HOME | End: 2023-09-06
Attending: STUDENT IN AN ORGANIZED HEALTH CARE EDUCATION/TRAINING PROGRAM
Payer: MEDICARE

## 2023-09-06 VITALS
TEMPERATURE: 97.8 F | OXYGEN SATURATION: 97 % | RESPIRATION RATE: 18 BRPM | HEART RATE: 67 BPM | DIASTOLIC BLOOD PRESSURE: 78 MMHG | WEIGHT: 140 LBS | HEIGHT: 65 IN | BODY MASS INDEX: 23.32 KG/M2 | SYSTOLIC BLOOD PRESSURE: 155 MMHG

## 2023-09-06 DIAGNOSIS — N17.9 AKI (ACUTE KIDNEY INJURY) (HCC): ICD-10-CM

## 2023-09-06 DIAGNOSIS — R53.1 GENERALIZED WEAKNESS: Primary | ICD-10-CM

## 2023-09-06 LAB
ALBUMIN SERPL-MCNC: 3.2 G/DL (ref 3.5–5.2)
ALP SERPL-CCNC: 63 U/L (ref 35–104)
ALT SERPL-CCNC: 19 U/L (ref 0–32)
ANION GAP SERPL CALCULATED.3IONS-SCNC: 9 MMOL/L (ref 7–16)
AST SERPL-CCNC: 22 U/L (ref 0–31)
BACTERIA URNS QL MICRO: ABNORMAL
BASOPHILS # BLD: 0.06 K/UL (ref 0–0.2)
BASOPHILS NFR BLD: 1 % (ref 0–2)
BILIRUB SERPL-MCNC: 1.1 MG/DL (ref 0–1.2)
BILIRUB UR QL STRIP: NEGATIVE
BUN SERPL-MCNC: 18 MG/DL (ref 6–23)
CALCIUM SERPL-MCNC: 8.8 MG/DL (ref 8.6–10.2)
CHLORIDE SERPL-SCNC: 101 MMOL/L (ref 98–107)
CLARITY UR: CLEAR
CO2 SERPL-SCNC: 29 MMOL/L (ref 22–29)
COLOR UR: YELLOW
CREAT SERPL-MCNC: 1.2 MG/DL (ref 0.5–1)
EKG ATRIAL RATE: 73 BPM
EKG P AXIS: -23 DEGREES
EKG P-R INTERVAL: 182 MS
EKG Q-T INTERVAL: 402 MS
EKG QRS DURATION: 84 MS
EKG QTC CALCULATION (BAZETT): 442 MS
EKG R AXIS: -20 DEGREES
EKG T AXIS: 4 DEGREES
EKG VENTRICULAR RATE: 73 BPM
EOSINOPHIL # BLD: 0.17 K/UL (ref 0.05–0.5)
EOSINOPHILS RELATIVE PERCENT: 1 % (ref 0–6)
ERYTHROCYTE [DISTWIDTH] IN BLOOD BY AUTOMATED COUNT: 14.9 % (ref 11.5–15)
GFR SERPL CREATININE-BSD FRML MDRD: 42 ML/MIN/1.73M2
GLUCOSE SERPL-MCNC: 133 MG/DL (ref 74–99)
GLUCOSE UR STRIP-MCNC: NEGATIVE MG/DL
HCT VFR BLD AUTO: 35.7 % (ref 34–48)
HGB BLD-MCNC: 11.8 G/DL (ref 11.5–15.5)
HGB UR QL STRIP.AUTO: ABNORMAL
IMM GRANULOCYTES # BLD AUTO: 0.06 K/UL (ref 0–0.58)
IMM GRANULOCYTES NFR BLD: 1 % (ref 0–5)
INFLUENZA A BY PCR: NOT DETECTED
INFLUENZA B BY PCR: NOT DETECTED
KETONES UR STRIP-MCNC: NEGATIVE MG/DL
LEUKOCYTE ESTERASE UR QL STRIP: ABNORMAL
LYMPHOCYTES NFR BLD: 1.29 K/UL (ref 1.5–4)
LYMPHOCYTES RELATIVE PERCENT: 11 % (ref 20–42)
MCH RBC QN AUTO: 30.9 PG (ref 26–35)
MCHC RBC AUTO-ENTMCNC: 33.1 G/DL (ref 32–34.5)
MCV RBC AUTO: 93.5 FL (ref 80–99.9)
MONOCYTES NFR BLD: 1.05 K/UL (ref 0.1–0.95)
MONOCYTES NFR BLD: 9 % (ref 2–12)
NEUTROPHILS NFR BLD: 78 % (ref 43–80)
NEUTS SEG NFR BLD: 9.47 K/UL (ref 1.8–7.3)
NITRITE UR QL STRIP: NEGATIVE
PH UR STRIP: 6 [PH] (ref 5–9)
PLATELET # BLD AUTO: 218 K/UL (ref 130–450)
PMV BLD AUTO: 9.6 FL (ref 7–12)
POTASSIUM SERPL-SCNC: 4 MMOL/L (ref 3.5–5)
PROT SERPL-MCNC: 6.1 G/DL (ref 6.4–8.3)
PROT UR STRIP-MCNC: 100 MG/DL
RBC # BLD AUTO: 3.82 M/UL (ref 3.5–5.5)
RBC #/AREA URNS HPF: ABNORMAL /HPF
SARS-COV-2 RDRP RESP QL NAA+PROBE: NOT DETECTED
SODIUM SERPL-SCNC: 139 MMOL/L (ref 132–146)
SP GR UR STRIP: 1.02 (ref 1–1.03)
SPECIMEN DESCRIPTION: NORMAL
TROPONIN I SERPL HS-MCNC: 15 NG/L (ref 0–9)
TROPONIN I SERPL HS-MCNC: 17 NG/L (ref 0–9)
UROBILINOGEN UR STRIP-ACNC: 1 EU/DL (ref 0–1)
WBC #/AREA URNS HPF: ABNORMAL /HPF
WBC OTHER # BLD: 12.1 K/UL (ref 4.5–11.5)

## 2023-09-06 PROCEDURE — 84484 ASSAY OF TROPONIN QUANT: CPT

## 2023-09-06 PROCEDURE — 93010 ELECTROCARDIOGRAM REPORT: CPT | Performed by: INTERNAL MEDICINE

## 2023-09-06 PROCEDURE — 87086 URINE CULTURE/COLONY COUNT: CPT

## 2023-09-06 PROCEDURE — 85025 COMPLETE CBC W/AUTO DIFF WBC: CPT

## 2023-09-06 PROCEDURE — 87502 INFLUENZA DNA AMP PROBE: CPT

## 2023-09-06 PROCEDURE — 87635 SARS-COV-2 COVID-19 AMP PRB: CPT

## 2023-09-06 PROCEDURE — 2580000003 HC RX 258: Performed by: STUDENT IN AN ORGANIZED HEALTH CARE EDUCATION/TRAINING PROGRAM

## 2023-09-06 PROCEDURE — 80053 COMPREHEN METABOLIC PANEL: CPT

## 2023-09-06 PROCEDURE — 93005 ELECTROCARDIOGRAM TRACING: CPT | Performed by: STUDENT IN AN ORGANIZED HEALTH CARE EDUCATION/TRAINING PROGRAM

## 2023-09-06 PROCEDURE — 71045 X-RAY EXAM CHEST 1 VIEW: CPT

## 2023-09-06 PROCEDURE — 81001 URINALYSIS AUTO W/SCOPE: CPT

## 2023-09-06 PROCEDURE — 99285 EMERGENCY DEPT VISIT HI MDM: CPT

## 2023-09-06 RX ORDER — 0.9 % SODIUM CHLORIDE 0.9 %
1000 INTRAVENOUS SOLUTION INTRAVENOUS ONCE
Status: COMPLETED | OUTPATIENT
Start: 2023-09-06 | End: 2023-09-06

## 2023-09-06 RX ADMIN — SODIUM CHLORIDE 1000 ML: 9 INJECTION, SOLUTION INTRAVENOUS at 10:54

## 2023-09-06 RX ADMIN — SODIUM CHLORIDE 1000 ML: 9 INJECTION, SOLUTION INTRAVENOUS at 12:59

## 2023-09-06 NOTE — ED NOTES
Family decided they were going to take patient back to [facility].       [address] [place]  09/06/23 1999

## 2023-09-06 NOTE — TELEPHONE ENCOUNTER
Writer contacted ED provider to inform of 30 day readmission risk. Writer's attempt to contact ED provider was unsuccessful.        Call Back: If you need to call back to inform of disposition you can contact me at [phone]

## 2023-09-06 NOTE — ED NOTES
Patient moved into room to use bedpan. Patient did not feel comfortable ambulating to the restroom due to a fall this morning at [place] [employer].       [address] [facility] [address] [place]  09/06/23 5787

## 2023-09-06 NOTE — ED PROVIDER NOTES
[facility]       Discharge Medication List as of 2023  5:41 PM        CONTINUE these medications which have NOT CHANGED    Details   atorvastatin (LIPITOR) 20 MG tablet Take 1 tablet by mouth at bedtimeHistorical Med      citalopram (CELEXA) 20 MG tablet Take 1 tablet by mouth dailyHistorical Med      donepezil (ARICEPT) 5 MG tablet Take 1 tablet by mouth nightlyHistorical Med      pantoprazole (PROTONIX) 40 MG tablet Take 1 tablet by mouth dailyHistorical Med      Probiotic Product (ACIDOPHILUS PROBIOTIC) CAPS capsule Take 1 capsule by mouth dailyHistorical Med      docusate sodium (COLACE) 100 MG capsule Take 1 capsule by mouth nightlyHistorical Med      Potassium 99 MG TABS Take 1 tablet by mouth dailyHistorical Med      acetaminophen (TYLENOL) 500 MG tablet Take 2 tablets by mouth at bedtimeHistorical Med      aspirin 81 MG chewable tablet Take 1 tablet by mouth in the morning., Disp-30 tablet, R-3Normal      Vitamin D (CHOLECALCIFEROL) 25 MCG (1000 UT) TABS tablet Take 1 tablet by mouth dailyLabeling may look different. 25 mcg=1000 Units. Please double check dosages. Historical Med      Multiple Vitamins-Minerals (MULTI VITAMIN/MINERALS) TABS Take 1 tablet by mouth dailyHistorical Med             ALLERGIES     Codeine and Penicillins    FAMILYHISTORY       Family History   Problem Relation Age of Onset    Cancer Mother     Stroke Father     Breast Cancer Sister     Other Brother         brain tumor    Breast Cancer Sister         SOCIAL HISTORY       Social History     Tobacco Use    Smoking status: Former     Packs/day: 0.50     Years: 9.00     Pack years: 4.50     Types: Cigarettes     Start date: 10/23/1948     Quit date: 10/23/1957     Years since quittin.9    Smokeless tobacco: Never   Vaping Use    Vaping Use: Never used   Substance Use Topics    Alcohol use: Yes     Comment: social    Drug use: No       SCREENINGS        Ashley Coma Scale  Eye Opening: Spontaneous  Best Verbal

## 2023-09-06 NOTE — CARE COORDINATION
Social Work /Transition of Care:    Pt presents to the ED secondary to feeling unwell. Pt sent to the ED from [address] [facility]. MATHEUS met with pt, , daughter and another family member. Pt and family report plan was for pt to discharge from the facility on Friday but they feel pt is not doing well enough to return home with her . Pt stated she has been walking out to the courtyard at the facility but pt's family corrected her and stated she has been in the wheel chair out to the courtyard. Pt's daughter stated pt has been passing out in the bathroom. Pt's family states they are fearful that pt's room at the facility may have been given away at this point. MATHEUS explained that the facility should not be giving pt's bed away unless pt needed admission to the hospital and pt's family did not want to pay to hold the bed. MATHEUS spoke with Contreras Mills from [facility] who states the  will be calling pt's daughter, Ana Luisa Tierney.

## 2023-09-06 NOTE — ED NOTES
[facility] nurse [facility]       [address] [place], [place]  09/06/23 7297

## 2023-09-07 LAB
MICROORGANISM SPEC CULT: ABNORMAL
SPECIMEN DESCRIPTION: ABNORMAL

## 2024-04-28 ENCOUNTER — HOSPITAL ENCOUNTER (EMERGENCY)
Age: 89
Discharge: HOME | End: 2024-04-29
Attending: EMERGENCY MEDICINE
Payer: MEDICARE

## 2024-04-28 ENCOUNTER — APPOINTMENT (OUTPATIENT)
Dept: GENERAL RADIOLOGY | Age: 89
End: 2024-04-28
Payer: MEDICARE

## 2024-04-28 DIAGNOSIS — R19.7 DIARRHEA, UNSPECIFIED TYPE: Primary | ICD-10-CM

## 2024-04-28 LAB
ALBUMIN SERPL-MCNC: 3.5 G/DL (ref 3.5–5.2)
ALP SERPL-CCNC: 59 U/L (ref 35–104)
ALT SERPL-CCNC: 16 U/L (ref 0–32)
ANION GAP SERPL CALCULATED.3IONS-SCNC: 11 MMOL/L (ref 7–16)
AST SERPL-CCNC: 26 U/L (ref 0–31)
BASOPHILS # BLD: 0.03 K/UL (ref 0–0.2)
BASOPHILS NFR BLD: 0 % (ref 0–2)
BILIRUB SERPL-MCNC: 1.8 MG/DL (ref 0–1.2)
BILIRUB UR QL STRIP: NEGATIVE
BNP SERPL-MCNC: 1289 PG/ML (ref 0–450)
BUN SERPL-MCNC: 18 MG/DL (ref 6–23)
CALCIUM SERPL-MCNC: 8.6 MG/DL (ref 8.6–10.2)
CHLORIDE SERPL-SCNC: 100 MMOL/L (ref 98–107)
CK SERPL-CCNC: 121 U/L (ref 20–180)
CLARITY UR: CLEAR
CO2 SERPL-SCNC: 23 MMOL/L (ref 22–29)
COLOR UR: YELLOW
CREAT SERPL-MCNC: 1.4 MG/DL (ref 0.5–1)
EOSINOPHIL # BLD: 0.01 K/UL (ref 0.05–0.5)
EOSINOPHILS RELATIVE PERCENT: 0 % (ref 0–6)
ERYTHROCYTE [DISTWIDTH] IN BLOOD BY AUTOMATED COUNT: 15.2 % (ref 11.5–15)
FLUAV RNA RESP QL NAA+PROBE: NOT DETECTED
FLUBV RNA RESP QL NAA+PROBE: NOT DETECTED
GFR SERPL CREATININE-BSD FRML MDRD: 34 ML/MIN/1.73M2
GLUCOSE SERPL-MCNC: 155 MG/DL (ref 74–99)
GLUCOSE UR STRIP-MCNC: 250 MG/DL
HCT VFR BLD AUTO: 35.8 % (ref 34–48)
HGB BLD-MCNC: 11.9 G/DL (ref 11.5–15.5)
HGB UR QL STRIP.AUTO: ABNORMAL
IMM GRANULOCYTES # BLD AUTO: 0.05 K/UL (ref 0–0.58)
IMM GRANULOCYTES NFR BLD: 0 % (ref 0–5)
KETONES UR STRIP-MCNC: 15 MG/DL
LACTATE BLDV-SCNC: 0.9 MMOL/L (ref 0.5–1.9)
LACTATE BLDV-SCNC: 2 MMOL/L (ref 0.5–1.9)
LEUKOCYTE ESTERASE UR QL STRIP: ABNORMAL
LIPASE SERPL-CCNC: 40 U/L (ref 13–60)
LYMPHOCYTES NFR BLD: 0.8 K/UL (ref 1.5–4)
LYMPHOCYTES RELATIVE PERCENT: 6 % (ref 20–42)
MAGNESIUM SERPL-MCNC: 2.1 MG/DL (ref 1.6–2.6)
MCH RBC QN AUTO: 31.9 PG (ref 26–35)
MCHC RBC AUTO-ENTMCNC: 33.2 G/DL (ref 32–34.5)
MCV RBC AUTO: 96 FL (ref 80–99.9)
MONOCYTES NFR BLD: 1.5 K/UL (ref 0.1–0.95)
MONOCYTES NFR BLD: 12 % (ref 2–12)
NEUTROPHILS NFR BLD: 81 % (ref 43–80)
NEUTS SEG NFR BLD: 10.32 K/UL (ref 1.8–7.3)
NITRITE UR QL STRIP: NEGATIVE
PH UR STRIP: 6 [PH] (ref 5–9)
PLATELET # BLD AUTO: 140 K/UL (ref 130–450)
PMV BLD AUTO: 10.1 FL (ref 7–12)
POTASSIUM SERPL-SCNC: 3.8 MMOL/L (ref 3.5–5)
PROT SERPL-MCNC: 5.9 G/DL (ref 6.4–8.3)
PROT UR STRIP-MCNC: ABNORMAL MG/DL
RBC # BLD AUTO: 3.73 M/UL (ref 3.5–5.5)
RBC #/AREA URNS HPF: ABNORMAL /HPF
SARS-COV-2 RNA RESP QL NAA+PROBE: NOT DETECTED
SODIUM SERPL-SCNC: 134 MMOL/L (ref 132–146)
SP GR UR STRIP: 1.01 (ref 1–1.03)
SPECIMEN DESCRIPTION: NORMAL
TROPONIN I SERPL HS-MCNC: 21 NG/L (ref 0–9)
TROPONIN I SERPL HS-MCNC: 26 NG/L (ref 0–9)
UROBILINOGEN UR STRIP-ACNC: 1 EU/DL (ref 0–1)
WBC #/AREA URNS HPF: ABNORMAL /HPF
WBC OTHER # BLD: 12.7 K/UL (ref 4.5–11.5)

## 2024-04-28 PROCEDURE — 83880 ASSAY OF NATRIURETIC PEPTIDE: CPT

## 2024-04-28 PROCEDURE — 85025 COMPLETE CBC W/AUTO DIFF WBC: CPT

## 2024-04-28 PROCEDURE — 93005 ELECTROCARDIOGRAM TRACING: CPT

## 2024-04-28 PROCEDURE — 82550 ASSAY OF CK (CPK): CPT

## 2024-04-28 PROCEDURE — 2580000003 HC RX 258

## 2024-04-28 PROCEDURE — 81001 URINALYSIS AUTO W/SCOPE: CPT

## 2024-04-28 PROCEDURE — 99285 EMERGENCY DEPT VISIT HI MDM: CPT

## 2024-04-28 PROCEDURE — 71045 X-RAY EXAM CHEST 1 VIEW: CPT

## 2024-04-28 PROCEDURE — 83605 ASSAY OF LACTIC ACID: CPT

## 2024-04-28 PROCEDURE — 87040 BLOOD CULTURE FOR BACTERIA: CPT

## 2024-04-28 PROCEDURE — 84484 ASSAY OF TROPONIN QUANT: CPT

## 2024-04-28 PROCEDURE — 80053 COMPREHEN METABOLIC PANEL: CPT

## 2024-04-28 PROCEDURE — 87636 SARSCOV2 & INF A&B AMP PRB: CPT

## 2024-04-28 PROCEDURE — 83690 ASSAY OF LIPASE: CPT

## 2024-04-28 PROCEDURE — 83735 ASSAY OF MAGNESIUM: CPT

## 2024-04-28 RX ORDER — 0.9 % SODIUM CHLORIDE 0.9 %
1000 INTRAVENOUS SOLUTION INTRAVENOUS ONCE
Status: COMPLETED | OUTPATIENT
Start: 2024-04-28 | End: 2024-04-28

## 2024-04-28 RX ADMIN — SODIUM CHLORIDE 1000 ML: 9 INJECTION, SOLUTION INTRAVENOUS at 19:42

## 2024-04-28 NOTE — ED PROVIDER NOTES
Department of Emergency Medicine   ED Provider Note  Admit Date/RoomTime: 4/28/2024  7:11 PM  ED Room: [facility]/17A-17          History of Present Illness:  4/28/24, Time: 7:25 PM EDT       Doreen Norwood is a 89 y.o. female presenting to the ED for and cough.  She notes that her symptoms started last night and have been constant since.  Notes that she feels generally weak all over and yesterday she was trying to get around her house when she felt to.  Narrating she fell and spent several hours on the ground.  Notes she is also having a cough productive of sputum per notes she does not know what it is like.  No chest pain or shortness of breath, abdominal dilated with no fevers or chills, abdominal pain, dysuria, hematuria, hematochezia, melena.    Review of Systems:     Pertinent positives and negatives are stated within HPI.      --------------------------------------------- PAST HISTORY ---------------------------------------------  Past Medical History:  has a past medical history of FABIO (acute kidney injury) (HCC), Cervical cancer (HCC), Chest pain, Chronic renal impairment, stage 2 (mild), Erosive osteoarthritis of multiple sites, Former smoker, stopped smoking in distant past, Gastritis, GERD (gastroesophageal reflux disease), Glucose intolerance (impaired glucose tolerance), HTN (hypertension), ILD (interstitial lung disease) (HCC), Microscopic polyangiitis (HCC), MVP (mitral valve prolapse), Near syncope, Orthostatic hypotension, Pseudogout of left wrist, Pulmonary embolus (HCC), Pulmonary fibrosis (HCC), Pulmonary nodule, Sjogren's disease (HCC), Volume depletion, and Wegener's granulomatosis (granulomatosis with polyangiitis).    Past Surgical History:  has a past surgical history that includes ECHO Compl W Dop Color Flow (6/4/2015); Kidney biopsy; Total knee arthroplasty (Bilateral); Tonsillectomy; and Ankle fracture surgery (Right, 6/30/2019).    Social History:  reports that she quit smoking about 66

## 2024-04-29 ENCOUNTER — APPOINTMENT (OUTPATIENT)
Dept: CT IMAGING | Age: 89
End: 2024-04-29
Payer: MEDICARE

## 2024-04-29 VITALS
SYSTOLIC BLOOD PRESSURE: 138 MMHG | DIASTOLIC BLOOD PRESSURE: 66 MMHG | TEMPERATURE: 99 F | OXYGEN SATURATION: 96 % | HEART RATE: 65 BPM | RESPIRATION RATE: 20 BRPM

## 2024-04-29 PROCEDURE — 6370000000 HC RX 637 (ALT 250 FOR IP): Performed by: EMERGENCY MEDICINE

## 2024-04-29 PROCEDURE — 2580000003 HC RX 258: Performed by: RADIOLOGY

## 2024-04-29 PROCEDURE — 74177 CT ABD & PELVIS W/CONTRAST: CPT

## 2024-04-29 PROCEDURE — 6360000004 HC RX CONTRAST MEDICATION: Performed by: RADIOLOGY

## 2024-04-29 PROCEDURE — 6370000000 HC RX 637 (ALT 250 FOR IP)

## 2024-04-29 RX ORDER — ACETAMINOPHEN 500 MG
1000 TABLET ORAL ONCE
Status: COMPLETED | OUTPATIENT
Start: 2024-04-29 | End: 2024-04-29

## 2024-04-29 RX ORDER — GUAIFENESIN 200 MG/10ML
200 LIQUID ORAL ONCE
Status: COMPLETED | OUTPATIENT
Start: 2024-04-29 | End: 2024-04-29

## 2024-04-29 RX ORDER — SODIUM CHLORIDE 0.9 % (FLUSH) 0.9 %
10 SYRINGE (ML) INJECTION PRN
Status: COMPLETED | OUTPATIENT
Start: 2024-04-29 | End: 2024-04-29

## 2024-04-29 RX ADMIN — GUAIFENESIN 200 MG: 200 SOLUTION ORAL at 06:27

## 2024-04-29 RX ADMIN — ACETAMINOPHEN 1000 MG: 500 TABLET ORAL at 06:28

## 2024-04-29 RX ADMIN — IOPAMIDOL 75 ML: 755 INJECTION, SOLUTION INTRAVENOUS at 01:09

## 2024-04-29 RX ADMIN — Medication 10 ML: at 01:09

## 2024-04-29 NOTE — ED NOTES
Isabel, daughter, asked RN to inform patient's RN Rekha that she be leaving. Isabel asked to call if patient discharged. Pt family informed pt pending admission or can go by [facility].

## 2024-04-29 NOTE — DISCHARGE INSTRUCTIONS
Follow-up with your primary care physician to follow-up on your symptoms and to discuss your recent emergency room visit.  Encourage good hydration.  Reasons to return would be worsening of her symptoms, severe abdominal pain, lightheadedness, dizziness, loss of consciousness, severe chest pain, severe shortness of breath, or development of uncontrolled fevers.

## 2024-04-29 NOTE — ED NOTES
Checked pt brief because she thought she had a bowel movement. Pt did not have a bowel movement. Alison care was provided and pt was placed on a purewick. Pt alert. Family at bedside. Patient on bedside monitor. Safety measures maintained. No concerns at this time.

## 2024-04-29 NOTE — ED NOTES
Pt ambulated with assistance. Pt unsteady but states she uses a walker normally at home. Provider made aware.

## 2024-04-29 NOTE — ED NOTES
Name: Doreen Norwood  : 3/3/1932  MRN: [id number]    Date: 2024    Benefits of immediately proceeding with Radiology exam outweigh the risks and therefore the following is being waived:      [] Pregnancy test    [] Protocol for Iodine allergy    [] MRI questionnaire    [x] BUN/Creatinine        Rina Kaur DO

## 2024-04-29 NOTE — ED NOTES
Pt given discharge instructions and verbalized understanding. IV's removed intact. Pt alert and oriented. Pt daughter here to take pt home. Pt taken out to ED entrance by Mally ARRIETA. No concerns at this time.

## 2024-04-30 LAB
EKG ATRIAL RATE: 73 BPM
EKG P AXIS: 49 DEGREES
EKG P-R INTERVAL: 178 MS
EKG Q-T INTERVAL: 418 MS
EKG QRS DURATION: 86 MS
EKG QTC CALCULATION (BAZETT): 460 MS
EKG R AXIS: -15 DEGREES
EKG T AXIS: -3 DEGREES
EKG VENTRICULAR RATE: 73 BPM

## 2024-04-30 PROCEDURE — 93010 ELECTROCARDIOGRAM REPORT: CPT | Performed by: INTERNAL MEDICINE

## 2024-05-02 ENCOUNTER — TELEPHONE (OUTPATIENT)
Dept: PALLATIVE CARE | Age: 89
End: 2024-05-02

## 2024-05-02 NOTE — TELEPHONE ENCOUNTER
returned call to pt daughter regarding referral to Palliative care. Pt scheduled for home visit with KRISTIE Carr on 5/16 at 1pm, daugther agreed to appt time and date. Advised to call for any needs or questions.      Nydia PEREZ,LSW  Palliative Medicine

## 2024-05-03 LAB
MICROORGANISM SPEC CULT: NORMAL
MICROORGANISM SPEC CULT: NORMAL
SERVICE CMNT-IMP: NORMAL
SERVICE CMNT-IMP: NORMAL
SPECIMEN DESCRIPTION: NORMAL
SPECIMEN DESCRIPTION: NORMAL

## 2024-05-16 ENCOUNTER — OFFICE VISIT (OUTPATIENT)
Dept: PALLATIVE CARE | Age: 89
End: 2024-05-16
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
## 2024-05-16 DIAGNOSIS — Z71.89 GOALS OF CARE, COUNSELING/DISCUSSION: ICD-10-CM

## 2024-05-16 DIAGNOSIS — Z51.5 PALLIATIVE CARE BY SPECIALIST: ICD-10-CM

## 2024-05-16 DIAGNOSIS — R54 AGE-RELATED PHYSICAL DEBILITY: ICD-10-CM

## 2024-05-16 RX ORDER — AVACOPAN 10 MG/1
30 CAPSULE ORAL 2 TIMES DAILY
COMMUNITY

## 2024-05-16 RX ORDER — FAMOTIDINE 20 MG/1
20 TABLET, FILM COATED ORAL DAILY
COMMUNITY

## 2024-05-16 RX ORDER — SULFAMETHOXAZOLE AND TRIMETHOPRIM 400; 80 MG/1; MG/1
1 TABLET ORAL
COMMUNITY

## 2024-05-16 RX ORDER — PREDNISONE 5 MG/1
5 TABLET ORAL DAILY
COMMUNITY

## 2024-05-16 NOTE — PROGRESS NOTES
Palliative Care Department  Provider: HANDY Whitfield - CNP    Location of Service:   The patient's home    Chief Complaint: Doreen Norwood is a 89 y.o. female with chief complaint of falls    Assessment/Plan      Granulomatosis with Polyangiitis (Wegener's):   -  Known to rheumatology at [facility]   -  On Rituxan therapy, due again in July per [facility] notes   -  On prednisone 5 mg daily?   -  On Tavneos 30 mg BID?   -  Bactrim 400-80 Monday, Wednesday, Friday?    Physical Debility/falls:   -  Uses a walker for ambulation   -  Has had some recent falls, but no serious injuries   -  She has a personal caregiver 3 days per week   -  She also has a lot of support from her family   -  HHC for PT/OT    Mild Cognitive Impairment:   -  On Aricept   -  very mild    Follow Up:  3 months and PRN.  They were encouraged to call with any questions, concerns, needs, or changes in symptoms.    Subjective:     HPI:  Doreen Norwood is a 89 y.o. female with significant medical history of Granulomatosis with Polyangiitis (Wegener's) , sjjgren disease, HTN, CKD, with hx of Right CVA and TIA's, as well as ILD, who was referred to [facility] for supportive care.    Subjective/Events/Discussions:  Mrs. Norwood was seen in her home today with her  and daughter, I am accompanied by the PM LSW  She is alert and able to voice her needs and concerns well  The role of PM was introduced and we reviewed her PMH at length  Time was also spent discussing ACP  Symptomatically she has very little complaint  She did have a recent illness, likely viral, and has had some falls recently  She reports that she is doing well now and is recovering  She does continue to have weakness and fatigue  He gait is unstable and she requires a walker  She also needs assistance with ADLs  She does have a private caregiver a few days per week, but she needs more assistance  She also would benefit from HHC with PT/OT  She has no symptomatic needs at

## 2024-05-16 NOTE — PROGRESS NOTES
Advance Care Planning      Palliative Medicine Provider (MD/NP)  Advance Care Planning (ACP) Conversation      Date of Conversation: 05/16/24  The patient and/or authorized decision maker consented to a voluntary Advance Care Planning conversation.   Individuals present for the conversation:   Patient with decision making capacity, Spouse Robert, and Daughter Milla    Legal Healthcare Agent(s):    Primary Decision Maker: Milla Guzmán - Child - [phone]    Primary Decision Maker: Robert Norwood - Child - [phone]    Secondary Decision Maker: [email or web address] - Child - [phone]    Supplemental (Other) Decision Maker: Robert Norwood - Spouse - [phone]    Supplemental (Other) Decision Maker: Ranjana Santiago - Child - [phone]    ACP documents available in EMR prior to discussion:  -None  -She does have Living Will and HC-POA per the daughter  -The daughter will email documents and they will be scanned into [employer]    Primary Palliative Diagnosis(es):  Granulomatosis with Polyangiitis    Conversation Summary:  She has had AD completed and the daughter will email use documents to scan into Epic  Time was spent discussing resuscitation at length  She expresses that if her prognosis is poor she does not want resuscitated  At this time she does not have a DNR and wants remain a Full Code for now  She wishes to continue treatment which will help her live longer and maintain her quality of life  However she would not want life support  She has a great support system    Resuscitation Status:    Code Status: Full Code    Outcomes / Completed Documentation:  An explanation of advance directives and their importance was provided and the following forms completed:    -No new documents completed.    If new document completed, original was provided to patient and/or family member.    Copy was placed for scanning into the [facility] EMR.      I spent 30 minutes providing separately identifiable ACP services with the patient and/or

## 2024-05-20 ENCOUNTER — TELEPHONE (OUTPATIENT)
Dept: PALLATIVE CARE | Age: 89
End: 2024-05-20

## 2024-05-20 ENCOUNTER — HOSPITAL ENCOUNTER (EMERGENCY)
Age: 89
Discharge: HOME | End: 2024-05-20
Attending: EMERGENCY MEDICINE
Payer: MEDICARE

## 2024-05-20 ENCOUNTER — APPOINTMENT (OUTPATIENT)
Dept: CT IMAGING | Age: 89
End: 2024-05-20
Attending: EMERGENCY MEDICINE
Payer: MEDICARE

## 2024-05-20 VITALS
TEMPERATURE: 97.8 F | SYSTOLIC BLOOD PRESSURE: 110 MMHG | DIASTOLIC BLOOD PRESSURE: 72 MMHG | RESPIRATION RATE: 16 BRPM | HEART RATE: 65 BPM | BODY MASS INDEX: 21.29 KG/M2 | WEIGHT: 140 LBS | OXYGEN SATURATION: 98 %

## 2024-05-20 DIAGNOSIS — R55 SYNCOPE AND COLLAPSE: Primary | ICD-10-CM

## 2024-05-20 DIAGNOSIS — S22.41XA CLOSED FRACTURE OF MULTIPLE RIBS OF RIGHT SIDE, INITIAL ENCOUNTER: ICD-10-CM

## 2024-05-20 LAB
ANION GAP SERPL CALCULATED.3IONS-SCNC: 6 MMOL/L (ref 7–16)
BASOPHILS # BLD: 0.04 K/UL (ref 0–0.2)
BASOPHILS NFR BLD: 1 % (ref 0–2)
BUN SERPL-MCNC: 18 MG/DL (ref 6–23)
CALCIUM SERPL-MCNC: 8.9 MG/DL (ref 8.6–10.2)
CHLORIDE SERPL-SCNC: 105 MMOL/L (ref 98–107)
CO2 SERPL-SCNC: 29 MMOL/L (ref 22–29)
CREAT SERPL-MCNC: 1.1 MG/DL (ref 0.5–1)
EKG ATRIAL RATE: 65 BPM
EKG P AXIS: 51 DEGREES
EKG P-R INTERVAL: 190 MS
EKG Q-T INTERVAL: 324 MS
EKG QRS DURATION: 84 MS
EKG QTC CALCULATION (BAZETT): 336 MS
EKG R AXIS: -10 DEGREES
EKG T AXIS: 36 DEGREES
EKG VENTRICULAR RATE: 65 BPM
EOSINOPHIL # BLD: 0.25 K/UL (ref 0.05–0.5)
EOSINOPHILS RELATIVE PERCENT: 4 % (ref 0–6)
ERYTHROCYTE [DISTWIDTH] IN BLOOD BY AUTOMATED COUNT: 16 % (ref 11.5–15)
GFR, ESTIMATED: 46 ML/MIN/1.73M2
GLUCOSE SERPL-MCNC: 105 MG/DL (ref 74–99)
HCT VFR BLD AUTO: 36.6 % (ref 34–48)
HGB BLD-MCNC: 11.8 G/DL (ref 11.5–15.5)
IMM GRANULOCYTES # BLD AUTO: 0.06 K/UL (ref 0–0.58)
IMM GRANULOCYTES NFR BLD: 1 % (ref 0–5)
LYMPHOCYTES NFR BLD: 2.12 K/UL (ref 1.5–4)
LYMPHOCYTES RELATIVE PERCENT: 30 % (ref 20–42)
MCH RBC QN AUTO: 31.7 PG (ref 26–35)
MCHC RBC AUTO-ENTMCNC: 32.2 G/DL (ref 32–34.5)
MCV RBC AUTO: 98.4 FL (ref 80–99.9)
MONOCYTES NFR BLD: 0.68 K/UL (ref 0.1–0.95)
MONOCYTES NFR BLD: 10 % (ref 2–12)
NEUTROPHILS NFR BLD: 56 % (ref 43–80)
NEUTS SEG NFR BLD: 4.04 K/UL (ref 1.8–7.3)
PLATELET # BLD AUTO: 199 K/UL (ref 130–450)
PMV BLD AUTO: 10.4 FL (ref 7–12)
POTASSIUM SERPL-SCNC: 3.5 MMOL/L (ref 3.5–5)
RBC # BLD AUTO: 3.72 M/UL (ref 3.5–5.5)
SODIUM SERPL-SCNC: 140 MMOL/L (ref 132–146)
WBC OTHER # BLD: 7.2 K/UL (ref 4.5–11.5)

## 2024-05-20 PROCEDURE — 71260 CT THORAX DX C+: CPT

## 2024-05-20 PROCEDURE — 6360000004 HC RX CONTRAST MEDICATION: Performed by: RADIOLOGY

## 2024-05-20 PROCEDURE — 99285 EMERGENCY DEPT VISIT HI MDM: CPT

## 2024-05-20 PROCEDURE — 2580000003 HC RX 258: Performed by: EMERGENCY MEDICINE

## 2024-05-20 PROCEDURE — 93005 ELECTROCARDIOGRAM TRACING: CPT | Performed by: EMERGENCY MEDICINE

## 2024-05-20 PROCEDURE — 93010 ELECTROCARDIOGRAM REPORT: CPT | Performed by: INTERNAL MEDICINE

## 2024-05-20 PROCEDURE — 80048 BASIC METABOLIC PNL TOTAL CA: CPT

## 2024-05-20 PROCEDURE — 85025 COMPLETE CBC W/AUTO DIFF WBC: CPT

## 2024-05-20 RX ORDER — OXYCODONE HYDROCHLORIDE 5 MG/1
2.5 TABLET ORAL EVERY 8 HOURS PRN
Qty: 5 TABLET | Refills: 0 | Status: SHIPPED | OUTPATIENT
Start: 2024-05-20 | End: 2024-05-21

## 2024-05-20 RX ORDER — LIDOCAINE 50 MG/G
1 PATCH TOPICAL DAILY
Qty: 10 PATCH | Refills: 0 | Status: SHIPPED | OUTPATIENT
Start: 2024-05-20 | End: 2024-05-30

## 2024-05-20 RX ORDER — 0.9 % SODIUM CHLORIDE 0.9 %
1000 INTRAVENOUS SOLUTION INTRAVENOUS ONCE
Status: COMPLETED | OUTPATIENT
Start: 2024-05-20 | End: 2024-05-20

## 2024-05-20 RX ADMIN — SODIUM CHLORIDE 1000 ML: 9 INJECTION, SOLUTION INTRAVENOUS at 13:16

## 2024-05-20 RX ADMIN — IOPAMIDOL 75 ML: 755 INJECTION, SOLUTION INTRAVENOUS at 15:00

## 2024-05-20 ASSESSMENT — PAIN - FUNCTIONAL ASSESSMENT: PAIN_FUNCTIONAL_ASSESSMENT: NONE - DENIES PAIN

## 2024-05-20 ASSESSMENT — LIFESTYLE VARIABLES
HOW MANY STANDARD DRINKS CONTAINING ALCOHOL DO YOU HAVE ON A TYPICAL DAY: PATIENT DOES NOT DRINK
HOW OFTEN DO YOU HAVE A DRINK CONTAINING ALCOHOL: NEVER

## 2024-05-20 NOTE — TELEPHONE ENCOUNTER
Referral for home health care faxed to [facility] per pt and family request. Orders placed by Palliative care KRISTIE Carr. No other needs at this time.      [facility]:  Phone:[phone]  Fax:[phone]      Nydia PEREZ,LSW  Palliative Medicine

## 2024-05-20 NOTE — DISCHARGE INSTRUCTIONS
Return to the emergency department if you have worsening chest pain, difficulty breathing, vomiting, unable to keep down food or drink, dizziness, lightheadedness, passing out, numbness or weakness in your extremities, difficulty walking, talking, or seeing, or any other new or concerning symptoms.     Use incentive spirometer 10 times per hour while awake.

## 2024-05-20 NOTE — ED PROVIDER NOTES
ED PROVIDER NOTE    Chief Complaint   Patient presents with    Loss of Consciousness     Syncopal episode, hx orthostatic hypotension, sent in d/t upper right back pain 5/10 w/ movement -head, -thinners       HPI:  5/20/24,   Time: 1:07 PM EDT       Doreen Norwood is a 89 y.o. female presenting to the ED for syncope and right-sided back pain.  Acute onset shortly prior to arrival.  Patient has a history of recurrent syncope, has had extensive cardiac evaluation for this.  Also has known history of valvular heart disease, has declined surgery for this.  Today she went to take a walk around her condo and after 1 lap around the condo she lost consciousness and regained consciousness on the ground.  No preceding dizziness or lightheadedness.  Denies any recent illness, fever, chills, cough, chest pain, shortness of breath, abdominal pain, nausea, vomiting, diarrhea.  No black or bloody stools.  Not on anticoagulants.  Has been ambulatory since the incident.  Complaining of pain to the right posterior lateral chest wall.  Worse with inspiration.  No shortness of breath at this time.    Chart review: hx of GPA, PE, HTN, GERD,Sjogren's disease, ILD, CKD, depression    Reviewed palliative care progress note from 5/16/24 by Pierre Carr NP:  Dx GPA on rituxan, prednisone, bactrim; physical debility/falls      Review of Systems:     Review of Systems  Pertinent positives and negatives as stated in HPI     --------------------------------------------- PAST HISTORY ---------------------------------------------  Past Medical History:   Past Medical History:   Diagnosis Date    FABIO (acute kidney injury) ([facility]) 6/17/2015    Cervical cancer (HCC) 6/3/2015    1970    Chest pain 6/3/2015    Chronic renal impairment, stage 2 (mild) 9/30/2015    Erosive osteoarthritis of multiple sites 6/3/2015    2005    Former smoker, stopped smoking in distant past     Gastritis 6/3/2015    GERD (gastroesophageal reflux disease) 6/3/2015    Glucose

## 2024-06-27 ENCOUNTER — TELEPHONE (OUTPATIENT)
Dept: INTERNAL MEDICINE CLINIC | Age: 89
End: 2024-06-27

## 2024-08-22 ENCOUNTER — OFFICE VISIT (OUTPATIENT)
Dept: PALLATIVE CARE | Age: 89
End: 2024-08-22
Payer: MEDICARE

## 2024-08-22 DIAGNOSIS — R54 AGE-RELATED PHYSICAL DEBILITY: Primary | ICD-10-CM

## 2024-08-22 DIAGNOSIS — Z51.5 PALLIATIVE CARE BY SPECIALIST: ICD-10-CM

## 2024-08-22 PROCEDURE — 1123F ACP DISCUSS/DSCN MKR DOCD: CPT | Performed by: NURSE PRACTITIONER

## 2024-08-22 PROCEDURE — 99348 HOME/RES VST EST LOW MDM 30: CPT | Performed by: NURSE PRACTITIONER

## 2024-08-22 NOTE — PROGRESS NOTES
Palliative Care Department  Provider: HANDY Whitfield - CNP    Location of Service:   The patient's home    Chief Complaint: Doreen Norwood is a 89 y.o. female with chief complaint of falls    Assessment/Plan      Granulomatosis with Polyangiitis (Wegener's):   -  Known to rheumatology at [facility]   -  On Rituxan therapy, due again in July per [facility] notes   -  On prednisone 5 mg daily?   -  On Tavneos 30 mg BID?   -  Bactrim 400-80 Monday, Wednesday, Friday?    Physical Debility/falls:   -  Uses a walker for ambulation   -  Has had some recent falls, but no serious injuries   -  She has a personal caregiver 3 days per week   -  She also has a lot of support from her family   -  [facility] for PT/OT    Mild Cognitive Impairment:   -  On Aricept   -  very mild    Follow Up:  6 months.  They were encouraged to call with any questions, concerns, needs, or changes in symptoms.    Subjective:     HPI:  Doreen Norwood is a 89 y.o. female with significant medical history of Granulomatosis with Polyangiitis (Wegener's) , sjjgren disease, HTN, CKD, with hx of Right CVA and TIA's, as well as ILD, who was referred to [facility]iative Medicine for supportive care.    Subjective/Events/Discussions:  Mrs. Norwood was seen in her home today with her  and caregiver  She reports she is doing well  She is grateful for her carefiber which has been very helpful for her  She denies any symptoms at this time  She has had several falls since we last visited  She was started on midodrine recently and she reports that she has been feeling much better the pas few weeks  She reports she continues to walk around her condo often and she is doing very well  There otherwise is no significant concerns that she expresses       Past Medical History:   Diagnosis Date    FABIO (acute kidney injury) (HCC) 6/17/2015    Cervical cancer (HCC) 6/3/2015    1970    Chest pain 6/3/2015    Chronic renal impairment, stage 2 (mild) 9/30/2015    Erosive

## 2024-10-22 PROBLEM — S81.811A LACERATION OF RIGHT LOWER LEG: Status: RESOLVED | Noted: 2023-03-21 | Resolved: 2024-10-22

## 2024-10-22 PROBLEM — R53.1 GENERALIZED WEAKNESS: Status: RESOLVED | Noted: 2023-08-23 | Resolved: 2024-10-22

## 2024-10-22 PROBLEM — F02.80 LATE ONSET ALZHEIMER'S DEMENTIA WITHOUT BEHAVIORAL DISTURBANCE (HCC): Status: ACTIVE | Noted: 2024-10-22

## 2024-10-22 PROBLEM — G30.1 LATE ONSET ALZHEIMER'S DEMENTIA WITHOUT BEHAVIORAL DISTURBANCE (HCC): Status: ACTIVE | Noted: 2024-10-22

## 2024-11-25 ENCOUNTER — APPOINTMENT (OUTPATIENT)
Dept: GENERAL RADIOLOGY | Age: 88
End: 2024-11-25
Payer: MEDICARE

## 2024-11-25 ENCOUNTER — APPOINTMENT (OUTPATIENT)
Dept: CT IMAGING | Age: 88
End: 2024-11-25
Payer: MEDICARE

## 2024-11-25 ENCOUNTER — HOSPITAL ENCOUNTER (EMERGENCY)
Age: 88
Discharge: HOME | End: 2024-11-25
Attending: EMERGENCY MEDICINE
Payer: MEDICARE

## 2024-11-25 VITALS
HEART RATE: 63 BPM | DIASTOLIC BLOOD PRESSURE: 64 MMHG | TEMPERATURE: 97.8 F | OXYGEN SATURATION: 96 % | WEIGHT: 156.2 LBS | SYSTOLIC BLOOD PRESSURE: 136 MMHG | BODY MASS INDEX: 23.07 KG/M2 | RESPIRATION RATE: 18 BRPM

## 2024-11-25 DIAGNOSIS — R55 SYNCOPE AND COLLAPSE: ICD-10-CM

## 2024-11-25 DIAGNOSIS — S42.034A CLOSED NONDISPLACED FRACTURE OF ACROMIAL END OF RIGHT CLAVICLE, INITIAL ENCOUNTER: ICD-10-CM

## 2024-11-25 DIAGNOSIS — T14.90XA TRAUMA: Primary | ICD-10-CM

## 2024-11-25 LAB
ALBUMIN SERPL-MCNC: 3.6 G/DL (ref 3.5–5.2)
ALP SERPL-CCNC: 60 U/L (ref 35–104)
ALT SERPL-CCNC: 15 U/L (ref 0–32)
ANION GAP SERPL CALCULATED.3IONS-SCNC: 7 MMOL/L (ref 7–16)
AST SERPL-CCNC: 25 U/L (ref 0–31)
BACTERIA URNS QL MICRO: ABNORMAL
BASOPHILS # BLD: 0.05 K/UL (ref 0–0.2)
BASOPHILS NFR BLD: 1 % (ref 0–2)
BILIRUB SERPL-MCNC: 1 MG/DL (ref 0–1.2)
BILIRUB UR QL STRIP: NEGATIVE
BUN SERPL-MCNC: 26 MG/DL (ref 6–23)
CALCIUM SERPL-MCNC: 9.3 MG/DL (ref 8.6–10.2)
CHLORIDE SERPL-SCNC: 104 MMOL/L (ref 98–107)
CLARITY UR: CLEAR
CO2 SERPL-SCNC: 29 MMOL/L (ref 22–29)
COLOR UR: YELLOW
CREAT SERPL-MCNC: 1.1 MG/DL (ref 0.5–1)
D-DIMER QUANTITATIVE: 829 NG/ML DDU (ref 0–230)
EKG ATRIAL RATE: 62 BPM
EKG P AXIS: 6 DEGREES
EKG P-R INTERVAL: 202 MS
EKG Q-T INTERVAL: 404 MS
EKG QRS DURATION: 88 MS
EKG QTC CALCULATION (BAZETT): 410 MS
EKG R AXIS: -17 DEGREES
EKG T AXIS: -3 DEGREES
EKG VENTRICULAR RATE: 62 BPM
EOSINOPHIL # BLD: 0.21 K/UL (ref 0.05–0.5)
EOSINOPHILS RELATIVE PERCENT: 2 % (ref 0–6)
EPI CELLS #/AREA URNS HPF: ABNORMAL /HPF
ERYTHROCYTE [DISTWIDTH] IN BLOOD BY AUTOMATED COUNT: 15.3 % (ref 11.5–15)
GFR, ESTIMATED: 46 ML/MIN/1.73M2
GLUCOSE BLD-MCNC: 93 MG/DL (ref 74–99)
GLUCOSE SERPL-MCNC: 97 MG/DL (ref 74–99)
GLUCOSE UR STRIP-MCNC: NEGATIVE MG/DL
HCT VFR BLD AUTO: 36.1 % (ref 34–48)
HGB BLD-MCNC: 12.1 G/DL (ref 11.5–15.5)
HGB UR QL STRIP.AUTO: NEGATIVE
IMM GRANULOCYTES # BLD AUTO: 0.04 K/UL (ref 0–0.58)
IMM GRANULOCYTES NFR BLD: 0 % (ref 0–5)
KETONES UR STRIP-MCNC: NEGATIVE MG/DL
LEUKOCYTE ESTERASE UR QL STRIP: NEGATIVE
LYMPHOCYTES NFR BLD: 1.21 K/UL (ref 1.5–4)
LYMPHOCYTES RELATIVE PERCENT: 13 % (ref 20–42)
MAGNESIUM SERPL-MCNC: 2.1 MG/DL (ref 1.6–2.6)
MCH RBC QN AUTO: 31.8 PG (ref 26–35)
MCHC RBC AUTO-ENTMCNC: 33.5 G/DL (ref 32–34.5)
MCV RBC AUTO: 94.8 FL (ref 80–99.9)
MONOCYTES NFR BLD: 1.03 K/UL (ref 0.1–0.95)
MONOCYTES NFR BLD: 11 % (ref 2–12)
NEUTROPHILS NFR BLD: 73 % (ref 43–80)
NEUTS SEG NFR BLD: 6.76 K/UL (ref 1.8–7.3)
NITRITE UR QL STRIP: NEGATIVE
PH UR STRIP: 6 [PH] (ref 5–9)
PLATELET # BLD AUTO: 145 K/UL (ref 130–450)
PMV BLD AUTO: 9.8 FL (ref 7–12)
POTASSIUM SERPL-SCNC: 4.1 MMOL/L (ref 3.5–5)
PROT SERPL-MCNC: 5.9 G/DL (ref 6.4–8.3)
PROT UR STRIP-MCNC: NEGATIVE MG/DL
RBC # BLD AUTO: 3.81 M/UL (ref 3.5–5.5)
RBC #/AREA URNS HPF: ABNORMAL /HPF
SODIUM SERPL-SCNC: 140 MMOL/L (ref 132–146)
SP GR UR STRIP: 1.02 (ref 1–1.03)
TROPONIN I SERPL HS-MCNC: 17 NG/L (ref 0–9)
TROPONIN I SERPL HS-MCNC: 18 NG/L (ref 0–9)
UROBILINOGEN UR STRIP-ACNC: 0.2 EU/DL (ref 0–1)
WBC #/AREA URNS HPF: ABNORMAL /HPF
WBC OTHER # BLD: 9.3 K/UL (ref 4.5–11.5)

## 2024-11-25 PROCEDURE — 99285 EMERGENCY DEPT VISIT HI MDM: CPT

## 2024-11-25 PROCEDURE — 84484 ASSAY OF TROPONIN QUANT: CPT

## 2024-11-25 PROCEDURE — 87077 CULTURE AEROBIC IDENTIFY: CPT

## 2024-11-25 PROCEDURE — 80053 COMPREHEN METABOLIC PANEL: CPT

## 2024-11-25 PROCEDURE — 6360000004 HC RX CONTRAST MEDICATION: Performed by: RADIOLOGY

## 2024-11-25 PROCEDURE — 85379 FIBRIN DEGRADATION QUANT: CPT

## 2024-11-25 PROCEDURE — 93010 ELECTROCARDIOGRAM REPORT: CPT | Performed by: INTERNAL MEDICINE

## 2024-11-25 PROCEDURE — 71045 X-RAY EXAM CHEST 1 VIEW: CPT

## 2024-11-25 PROCEDURE — 87086 URINE CULTURE/COLONY COUNT: CPT

## 2024-11-25 PROCEDURE — 81001 URINALYSIS AUTO W/SCOPE: CPT

## 2024-11-25 PROCEDURE — 93005 ELECTROCARDIOGRAM TRACING: CPT | Performed by: EMERGENCY MEDICINE

## 2024-11-25 PROCEDURE — 71275 CT ANGIOGRAPHY CHEST: CPT

## 2024-11-25 PROCEDURE — 83735 ASSAY OF MAGNESIUM: CPT

## 2024-11-25 PROCEDURE — 73000 X-RAY EXAM OF COLLAR BONE: CPT

## 2024-11-25 PROCEDURE — 85025 COMPLETE CBC W/AUTO DIFF WBC: CPT

## 2024-11-25 PROCEDURE — 82947 ASSAY GLUCOSE BLOOD QUANT: CPT

## 2024-11-25 PROCEDURE — 73030 X-RAY EXAM OF SHOULDER: CPT

## 2024-11-25 RX ORDER — IOPAMIDOL 755 MG/ML
75 INJECTION, SOLUTION INTRAVASCULAR
Status: COMPLETED | OUTPATIENT
Start: 2024-11-25 | End: 2024-11-25

## 2024-11-25 RX ADMIN — IOPAMIDOL 75 ML: 755 INJECTION, SOLUTION INTRAVENOUS at 12:17

## 2024-11-25 ASSESSMENT — PAIN DESCRIPTION - ORIENTATION: ORIENTATION: RIGHT

## 2024-11-25 ASSESSMENT — PAIN SCALES - GENERAL: PAINLEVEL_OUTOF10: 10

## 2024-11-25 ASSESSMENT — PAIN - FUNCTIONAL ASSESSMENT: PAIN_FUNCTIONAL_ASSESSMENT: 0-10

## 2024-11-25 ASSESSMENT — PAIN DESCRIPTION - LOCATION: LOCATION: SHOULDER

## 2024-11-25 ASSESSMENT — PAIN DESCRIPTION - DESCRIPTORS: DESCRIPTORS: DISCOMFORT;THROBBING

## 2024-11-25 NOTE — ED PROVIDER NOTES
[facility] EMERGENCY DEPARTMENT  EMERGENCY DEPARTMENT ENCOUNTER        Pt Name: Doreen Norwood  MRN: [id number]  Birthdate 3/3/1932  Date of evaluation: 2024  Provider: Susie Cameron DO  PCP: Zach Dozier MD  Note Started: 10:35 AM EST 24    CHIEF COMPLAINT       Chief Complaint   Patient presents with    Loss of Consciousness     Pt was getting clothes out of closet when she passed out and when she came to she had right shoulder pain         HISTORY OF PRESENT ILLNESS: 1 or more Elements   History From: patient, daughter and EMS    Limitations to history : mild memory decline at baseline    Doreen Norwood is a 89 y.o. female who presents with right shoulder pain s/p fall onto floor from syncopal event beginning this morning.  Patient was getting ready to attend a  for her sister-in-law and she was standing upright, taking her medication and fainted.  She did land on the floor.  EMS was called and placed the right upper extremity in a sling which improved patient's right shoulder.  She denies any symptoms of chest pain, shortness of breath, palpitations, dizziness or nausea prior to passing out.  She reports the daughter, who is a nurse, thinks maybe it is because the patient did not eat this morning and then took her medication.  The complaint has been persistent, moderate in severity, and worsened by nothing.  Patient denies fever/chills, sore throat, cough, congestion, chest pain, shortness of breath, edema, headache, visual disturbances, focal paresthesias, focal weakness, abdominal pain, nausea, vomiting, diarrhea, constipation, dysuria, hematuria, neck or back pain, rash or other complaints.        Nursing Notes were all reviewed and agreed with or any disagreements were addressed in the HPI.    REVIEW OF SYSTEMS :           Positives and Pertinent negatives as per HPI.     SURGICAL HISTORY     Past Surgical History:   Procedure Laterality

## 2024-11-25 NOTE — ED NOTES
rings removed from both hands (2 from left hand by eca & 2 on right hand by daughter)  & given to daughter. Daughter also removed necklace.

## 2024-11-27 LAB
MICROORGANISM SPEC CULT: ABNORMAL
SERVICE CMNT-IMP: ABNORMAL
SPECIMEN DESCRIPTION: ABNORMAL

## 2024-12-03 PROBLEM — N30.00 ACUTE CYSTITIS WITHOUT HEMATURIA: Status: ACTIVE | Noted: 2024-12-03

## 2024-12-05 ENCOUNTER — OFFICE VISIT (OUTPATIENT)
Dept: ORTHOPEDIC SURGERY | Age: 88
End: 2024-12-05
Payer: MEDICARE

## 2024-12-05 VITALS
RESPIRATION RATE: 24 BRPM | OXYGEN SATURATION: 96 % | TEMPERATURE: 97.9 F | HEART RATE: 75 BPM | DIASTOLIC BLOOD PRESSURE: 76 MMHG | SYSTOLIC BLOOD PRESSURE: 133 MMHG

## 2024-12-05 DIAGNOSIS — S42.034A CLOSED NONDISPLACED FRACTURE OF ACROMIAL END OF RIGHT CLAVICLE, INITIAL ENCOUNTER: Primary | ICD-10-CM

## 2024-12-05 PROCEDURE — 1159F MED LIST DOCD IN RCRD: CPT | Performed by: STUDENT IN AN ORGANIZED HEALTH CARE EDUCATION/TRAINING PROGRAM

## 2024-12-05 PROCEDURE — 1160F RVW MEDS BY RX/DR IN RCRD: CPT | Performed by: STUDENT IN AN ORGANIZED HEALTH CARE EDUCATION/TRAINING PROGRAM

## 2024-12-05 PROCEDURE — 1125F AMNT PAIN NOTED PAIN PRSNT: CPT | Performed by: STUDENT IN AN ORGANIZED HEALTH CARE EDUCATION/TRAINING PROGRAM

## 2024-12-05 PROCEDURE — 1123F ACP DISCUSS/DSCN MKR DOCD: CPT | Performed by: STUDENT IN AN ORGANIZED HEALTH CARE EDUCATION/TRAINING PROGRAM

## 2024-12-05 PROCEDURE — 99203 OFFICE O/P NEW LOW 30 MIN: CPT | Performed by: STUDENT IN AN ORGANIZED HEALTH CARE EDUCATION/TRAINING PROGRAM

## 2024-12-05 NOTE — PROGRESS NOTES
New Shoulder Patient Visit     Referring Provider:   No referring provider defined for this encounter.    CHIEF COMPLAINT:   Chief Complaint   Patient presents with    Follow-up     F/u minimally displaced fx of the distal clavicle      Date of injury 11/25/2024    HPI:      Doreen Norwood is a 89 y.o. year old female who is right-hand dominant.  She had a syncopal episode in her closet about 10 days ago and fell onto her shoulder sustaining a minimally displaced clavicle fracture.  She was seen in the ER placed in a sling and sent here for further follow-up.  She does have pain with range of motion.  She denies any problems with the shoulder in the past.  She does live alone with her .  Denies fever and chills.  Denies numbness tingling.    PAST MEDICAL HISTORY  Past Medical History:   Diagnosis Date    FABIO (acute kidney injury) ([facility]) 6/17/2015    Cervical cancer ([facility]) 6/3/2015    1970    Chest pain 6/3/2015    Chronic renal impairment, stage 2 (mild) 9/30/2015    Erosive osteoarthritis of multiple sites 6/3/2015    2005    Former smoker, stopped smoking in distant past     Gastritis 6/3/2015    GERD (gastroesophageal reflux disease) 6/3/2015    Glucose intolerance (impaired glucose tolerance) 6/3/2015    2004    HTN (hypertension) 6/3/2015    ILD (interstitial lung disease) ([facility]) 6/9/2015    Microscopic polyangiitis ([facility]) 6/16/2015    MVP (mitral valve prolapse) 6/3/2015    1982    Near syncope 6/3/2015    Orthostatic hypotension 6/4/2015    Pseudogout of left wrist 6/3/2015    2005    Pulmonary embolus ([facility]) 9/30/2015    BILATERAL    Pulmonary fibrosis ([facility]) 6/4/2015    Pulmonary nodule 6/4/2015    Sjogren's disease ([facility]) 6/9/2015    Volume depletion 6/17/2015    Wegener's granulomatosis (granulomatosis with polyangiitis)        PAST SURGICAL HISTORY  Past Surgical History:   Procedure Laterality Date    ANKLE FRACTURE SURGERY Right 6/30/2019    ANKLE OPEN REDUCTION INTERNAL FIXATION performed by [facility]

## 2025-01-06 ENCOUNTER — HOSPITAL ENCOUNTER (OUTPATIENT)
Dept: GENERAL RADIOLOGY | Age: 89
Discharge: HOME | End: 2025-01-08
Attending: INTERNAL MEDICINE
Payer: MEDICARE

## 2025-01-06 DIAGNOSIS — Z13.820 ENCOUNTER FOR SCREENING FOR OSTEOPOROSIS: ICD-10-CM

## 2025-01-06 PROCEDURE — 77080 DXA BONE DENSITY AXIAL: CPT

## 2025-01-16 ENCOUNTER — OFFICE VISIT (OUTPATIENT)
Dept: ORTHOPEDIC SURGERY | Age: 89
End: 2025-01-16
Payer: MEDICARE

## 2025-01-16 VITALS
TEMPERATURE: 97.8 F | SYSTOLIC BLOOD PRESSURE: 129 MMHG | OXYGEN SATURATION: 96 % | DIASTOLIC BLOOD PRESSURE: 83 MMHG | HEART RATE: 77 BPM

## 2025-01-16 DIAGNOSIS — S42.034A CLOSED NONDISPLACED FRACTURE OF ACROMIAL END OF RIGHT CLAVICLE, INITIAL ENCOUNTER: Primary | ICD-10-CM

## 2025-01-16 PROCEDURE — 1159F MED LIST DOCD IN RCRD: CPT | Performed by: STUDENT IN AN ORGANIZED HEALTH CARE EDUCATION/TRAINING PROGRAM

## 2025-01-16 PROCEDURE — 1123F ACP DISCUSS/DSCN MKR DOCD: CPT | Performed by: STUDENT IN AN ORGANIZED HEALTH CARE EDUCATION/TRAINING PROGRAM

## 2025-01-16 PROCEDURE — 99213 OFFICE O/P EST LOW 20 MIN: CPT | Performed by: STUDENT IN AN ORGANIZED HEALTH CARE EDUCATION/TRAINING PROGRAM

## 2025-01-16 PROCEDURE — 1160F RVW MEDS BY RX/DR IN RCRD: CPT | Performed by: STUDENT IN AN ORGANIZED HEALTH CARE EDUCATION/TRAINING PROGRAM

## 2025-01-16 NOTE — PROGRESS NOTES
CHIEF COMPLAINT:   Chief Complaint   Patient presents with    Follow-up     Right distal clavicle fracture      Date of injury 11/25/2024    HPI update 1/16/2025: She is here today for follow-up of her right distal clavicle fracture.  She is almost 2 months out from injury.  She is doing well and using her arm near normally.  She does have some pain when she sleeps on that side but otherwise it does not bother her throughout the day.  She is happy with her nonoperative management.    HPI:      Doreen Norwood is a 89 y.o. year old female who is right-hand dominant.  She had a syncopal episode in her closet about 10 days ago and fell onto her shoulder sustaining a minimally displaced clavicle fracture.  She was seen in the ER placed in a sling and sent here for further follow-up.  She does have pain with range of motion.  She denies any problems with the shoulder in the past.  She does live alone with her .  Denies fever and chills.  Denies numbness tingling.    PAST MEDICAL HISTORY  Past Medical History:   Diagnosis Date    FABIO (acute kidney injury) ([facility]) 6/17/2015    Cervical cancer ([facility]) 6/3/2015    1970    Chest pain 6/3/2015    Chronic renal impairment, stage 2 (mild) 9/30/2015    Erosive osteoarthritis of multiple sites 6/3/2015    2005    Former smoker, stopped smoking in distant past     Gastritis 6/3/2015    GERD (gastroesophageal reflux disease) 6/3/2015    Glucose intolerance (impaired glucose tolerance) 6/3/2015    2004    HTN (hypertension) 6/3/2015    ILD (interstitial lung disease) ([facility]) 6/9/2015    Microscopic polyangiitis ([facility]) 6/16/2015    MVP (mitral valve prolapse) 6/3/2015    1982    Near syncope 6/3/2015    Orthostatic hypotension 6/4/2015    Pseudogout of left wrist 6/3/2015    2005    Pulmonary embolus ([facility]) 9/30/2015    BILATERAL    Pulmonary fibrosis ([facility]) 6/4/2015    Pulmonary nodule 6/4/2015    Sjogren's disease ([facility]) 6/9/2015    Volume depletion 6/17/2015    Wegener's

## 2025-03-04 ENCOUNTER — TELEPHONE (OUTPATIENT)
Dept: PALLATIVE CARE | Age: 89
End: 2025-03-04

## 2025-03-04 NOTE — TELEPHONE ENCOUNTER
Spoke with pt daughter Milla regarding scheduling a home visit for March. Milla stated they have a lot going on at the moment and doesn't feel like pt can handle a visit at this time. Pt  has been in the hospital and staying at a rehab facility due to other medical issues and pt is having difficulty at this time. Provided Milla with our contact number to call us if and when she feels pt is ready for a visit. Number provided no appt scheduled at this time. Advised to call for any needs.      Nydia PEREZ,LSW  Palliative Medicine

## 2025-06-27 ENCOUNTER — HOSPITAL ENCOUNTER (EMERGENCY)
Age: 89
Discharge: HOME | End: 2025-06-27
Payer: MEDICARE

## 2025-06-27 VITALS
DIASTOLIC BLOOD PRESSURE: 60 MMHG | HEART RATE: 79 BPM | SYSTOLIC BLOOD PRESSURE: 125 MMHG | TEMPERATURE: 97.5 F | OXYGEN SATURATION: 98 % | RESPIRATION RATE: 16 BRPM

## 2025-06-27 DIAGNOSIS — S81.812A LACERATION OF LEFT LOWER EXTREMITY, INITIAL ENCOUNTER: Primary | ICD-10-CM

## 2025-06-27 PROCEDURE — 90471 IMMUNIZATION ADMIN: CPT | Performed by: PHYSICIAN ASSISTANT

## 2025-06-27 PROCEDURE — 6360000002 HC RX W HCPCS: Performed by: PHYSICIAN ASSISTANT

## 2025-06-27 PROCEDURE — 12002 RPR S/N/AX/GEN/TRNK2.6-7.5CM: CPT

## 2025-06-27 PROCEDURE — 90715 TDAP VACCINE 7 YRS/> IM: CPT | Performed by: PHYSICIAN ASSISTANT

## 2025-06-27 PROCEDURE — 99284 EMERGENCY DEPT VISIT MOD MDM: CPT

## 2025-06-27 RX ORDER — LIDOCAINE HYDROCHLORIDE 10 MG/ML
5 INJECTION, SOLUTION INFILTRATION; PERINEURAL ONCE
Status: COMPLETED | OUTPATIENT
Start: 2025-06-27 | End: 2025-06-27

## 2025-06-27 RX ADMIN — TETANUS TOXOID, REDUCED DIPHTHERIA TOXOID AND ACELLULAR PERTUSSIS VACCINE, ADSORBED 0.5 ML: 5; 2.5; 8; 8; 2.5 SUSPENSION INTRAMUSCULAR at 22:36

## 2025-06-27 RX ADMIN — LIDOCAINE HYDROCHLORIDE 5 ML: 10 INJECTION, SOLUTION INFILTRATION; PERINEURAL at 22:18

## 2025-06-27 ASSESSMENT — PAIN - FUNCTIONAL ASSESSMENT: PAIN_FUNCTIONAL_ASSESSMENT: NONE - DENIES PAIN

## 2025-06-28 NOTE — ED PROVIDER NOTES
Independent [facility] Visit.    HPI:  6/27/25,   Time: 10:07 PM EDT         Doreen Norwood is a 89 y.o. female presenting to the ED for wound check.  Patient states she was leaving a restaurant when she noticed blood on her leg.  She then noticed a laceration on her left ankle on the medial aspect.  She does not remember any specific injury.  Bleeding controlled.  Tetanus up-to-date.  She denies any fever, chills, body aches, chest pain, shortness of breath, numbness or tingling or muscle weakness.  No difficulty ambulating.    ROS:   Pertinent positives and negatives are stated within HPI, all other systems reviewed and are negative.  --------------------------------------------- PAST HISTORY ---------------------------------------------  Past Medical History:  has a past medical history of FABIO (acute kidney injury), Cervical cancer (HCC), Chest pain, Chronic renal impairment, stage 2 (mild), Erosive osteoarthritis of multiple sites, Former smoker, stopped smoking in distant past, Gastritis, GERD (gastroesophageal reflux disease), Glucose intolerance (impaired glucose tolerance), HTN (hypertension), ILD (interstitial lung disease) (HCC), Microscopic polyangiitis (HCC), MVP (mitral valve prolapse), Near syncope, Orthostatic hypotension, Pseudogout of left wrist, Pulmonary embolus (HCC), Pulmonary fibrosis (HCC), Pulmonary nodule, Sjogren's disease, Volume depletion, and Wegener's granulomatosis (granulomatosis with polyangiitis).    Past Surgical History:  has a past surgical history that includes ECHO Compl W Dop Color Flow (6/4/2015); Kidney biopsy; Total knee arthroplasty (Bilateral); Tonsillectomy; and Ankle fracture surgery (Right, 6/30/2019).    Social History:  reports that she quit smoking about 67 years ago. Her smoking use included cigarettes. She started smoking about 76 years ago. She has a 4.5 pack-year smoking history. She has never used smokeless tobacco. She reports current alcohol use. She reports that

## 2025-07-18 ENCOUNTER — APPOINTMENT (OUTPATIENT)
Dept: GENERAL RADIOLOGY | Age: 89
End: 2025-07-18
Payer: MEDICARE

## 2025-07-18 ENCOUNTER — HOSPITAL ENCOUNTER (EMERGENCY)
Age: 89
Discharge: HOME | End: 2025-07-18
Attending: STUDENT IN AN ORGANIZED HEALTH CARE EDUCATION/TRAINING PROGRAM
Payer: MEDICARE

## 2025-07-18 ENCOUNTER — APPOINTMENT (OUTPATIENT)
Dept: CT IMAGING | Age: 89
End: 2025-07-18
Payer: MEDICARE

## 2025-07-18 VITALS
DIASTOLIC BLOOD PRESSURE: 51 MMHG | RESPIRATION RATE: 19 BRPM | HEIGHT: 59 IN | WEIGHT: 180 LBS | TEMPERATURE: 98.1 F | SYSTOLIC BLOOD PRESSURE: 176 MMHG | OXYGEN SATURATION: 99 % | BODY MASS INDEX: 36.29 KG/M2 | HEART RATE: 71 BPM

## 2025-07-18 DIAGNOSIS — S51.012A SKIN TEAR OF LEFT ELBOW WITHOUT COMPLICATION, INITIAL ENCOUNTER: ICD-10-CM
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
## 2025-07-18 DIAGNOSIS — R07.89 CHEST WALL PAIN: ICD-10-CM

## 2025-07-18 DIAGNOSIS — R55 SYNCOPE AND COLLAPSE: Primary | ICD-10-CM

## 2025-07-18 DIAGNOSIS — I95.1 ORTHOSTATIC HYPOTENSION: ICD-10-CM

## 2025-07-18 LAB
ALBUMIN SERPL-MCNC: 3.8 G/DL (ref 3.5–5.2)
ALP SERPL-CCNC: 62 U/L (ref 35–104)
ALT SERPL-CCNC: 16 U/L (ref 0–35)
ANION GAP SERPL CALCULATED.3IONS-SCNC: 8 MMOL/L (ref 7–16)
AST SERPL-CCNC: 28 U/L (ref 0–35)
BASOPHILS # BLD: 0.05 K/UL (ref 0–0.2)
BASOPHILS NFR BLD: 1 % (ref 0–2)
BILIRUB SERPL-MCNC: 1.3 MG/DL (ref 0–1.2)
BILIRUB UR QL STRIP: NEGATIVE
BUN SERPL-MCNC: 19 MG/DL (ref 8–23)
CALCIUM SERPL-MCNC: 8.9 MG/DL (ref 8.8–10.2)
CHLORIDE SERPL-SCNC: 103 MMOL/L (ref 98–107)
CK SERPL-CCNC: 129 U/L (ref 0–170)
CLARITY UR: CLEAR
CO2 SERPL-SCNC: 28 MMOL/L (ref 22–29)
COLOR UR: YELLOW
CREAT SERPL-MCNC: 1 MG/DL (ref 0.5–1)
EKG ATRIAL RATE: 61 BPM
EKG P AXIS: 51 DEGREES
EKG P-R INTERVAL: 192 MS
EKG Q-T INTERVAL: 434 MS
EKG QRS DURATION: 88 MS
EKG QTC CALCULATION (BAZETT): 436 MS
EKG R AXIS: -4 DEGREES
EKG T AXIS: 7 DEGREES
EKG VENTRICULAR RATE: 61 BPM
EOSINOPHIL # BLD: 0.14 K/UL (ref 0.05–0.5)
EOSINOPHILS RELATIVE PERCENT: 2 % (ref 0–6)
ERYTHROCYTE [DISTWIDTH] IN BLOOD BY AUTOMATED COUNT: 15.5 % (ref 11.5–15)
GFR, ESTIMATED: 51 ML/MIN/1.73M2
GLUCOSE SERPL-MCNC: 96 MG/DL (ref 74–99)
GLUCOSE UR STRIP-MCNC: NEGATIVE MG/DL
HCT VFR BLD AUTO: 36.7 % (ref 34–48)
HGB BLD-MCNC: 12.7 G/DL (ref 11.5–15.5)
HGB UR QL STRIP.AUTO: ABNORMAL
IMM GRANULOCYTES # BLD AUTO: 0.04 K/UL (ref 0–0.58)
IMM GRANULOCYTES NFR BLD: 1 % (ref 0–5)
INR PPP: 1.1
KETONES UR STRIP-MCNC: NEGATIVE MG/DL
LEUKOCYTE ESTERASE UR QL STRIP: NEGATIVE
LYMPHOCYTES NFR BLD: 1.37 K/UL (ref 1.5–4)
LYMPHOCYTES RELATIVE PERCENT: 16 % (ref 20–42)
MAGNESIUM SERPL-MCNC: 2.1 MG/DL (ref 1.6–2.4)
MCH RBC QN AUTO: 32.8 PG (ref 26–35)
MCHC RBC AUTO-ENTMCNC: 34.6 G/DL (ref 32–34.5)
MCV RBC AUTO: 94.8 FL (ref 80–99.9)
MONOCYTES NFR BLD: 0.84 K/UL (ref 0.1–0.95)
MONOCYTES NFR BLD: 10 % (ref 2–12)
NEUTROPHILS NFR BLD: 72 % (ref 43–80)
NEUTS SEG NFR BLD: 6.4 K/UL (ref 1.8–7.3)
NITRITE UR QL STRIP: NEGATIVE
PH UR STRIP: 6.5 [PH] (ref 5–8)
PLATELET # BLD AUTO: 145 K/UL (ref 130–450)
PMV BLD AUTO: 10 FL (ref 7–12)
POTASSIUM SERPL-SCNC: 3.7 MMOL/L (ref 3.5–5.1)
PROT SERPL-MCNC: 6 G/DL (ref 6.4–8.3)
PROT UR STRIP-MCNC: NEGATIVE MG/DL
PROTHROMBIN TIME: 12.2 SEC (ref 9.3–12.4)
RBC # BLD AUTO: 3.87 M/UL (ref 3.5–5.5)
RBC #/AREA URNS HPF: ABNORMAL /HPF
SODIUM SERPL-SCNC: 139 MMOL/L (ref 136–145)
SP GR UR STRIP: 1.01 (ref 1–1.03)
TROPONIN I SERPL HS-MCNC: 17 NG/L (ref 0–14)
TROPONIN I SERPL HS-MCNC: 18 NG/L (ref 0–14)
UROBILINOGEN UR STRIP-ACNC: 0.2 EU/DL (ref 0–1)
WBC #/AREA URNS HPF: ABNORMAL /HPF
WBC OTHER # BLD: 8.8 K/UL (ref 4.5–11.5)

## 2025-07-18 PROCEDURE — 85025 COMPLETE CBC W/AUTO DIFF WBC: CPT

## 2025-07-18 PROCEDURE — 81001 URINALYSIS AUTO W/SCOPE: CPT

## 2025-07-18 PROCEDURE — 99285 EMERGENCY DEPT VISIT HI MDM: CPT

## 2025-07-18 PROCEDURE — 84484 ASSAY OF TROPONIN QUANT: CPT

## 2025-07-18 PROCEDURE — 83735 ASSAY OF MAGNESIUM: CPT

## 2025-07-18 PROCEDURE — 96360 HYDRATION IV INFUSION INIT: CPT

## 2025-07-18 PROCEDURE — 93010 ELECTROCARDIOGRAM REPORT: CPT | Performed by: INTERNAL MEDICINE

## 2025-07-18 PROCEDURE — 70450 CT HEAD/BRAIN W/O DYE: CPT

## 2025-07-18 PROCEDURE — 82550 ASSAY OF CK (CPK): CPT

## 2025-07-18 PROCEDURE — 93005 ELECTROCARDIOGRAM TRACING: CPT | Performed by: STUDENT IN AN ORGANIZED HEALTH CARE EDUCATION/TRAINING PROGRAM

## 2025-07-18 PROCEDURE — 2580000003 HC RX 258: Performed by: STUDENT IN AN ORGANIZED HEALTH CARE EDUCATION/TRAINING PROGRAM

## 2025-07-18 PROCEDURE — 71275 CT ANGIOGRAPHY CHEST: CPT

## 2025-07-18 PROCEDURE — 6360000004 HC RX CONTRAST MEDICATION: Performed by: RADIOLOGY

## 2025-07-18 PROCEDURE — 85610 PROTHROMBIN TIME: CPT

## 2025-07-18 PROCEDURE — 73521 X-RAY EXAM HIPS BI 2 VIEWS: CPT

## 2025-07-18 PROCEDURE — 87086 URINE CULTURE/COLONY COUNT: CPT

## 2025-07-18 PROCEDURE — 80053 COMPREHEN METABOLIC PANEL: CPT

## 2025-07-18 PROCEDURE — 6370000000 HC RX 637 (ALT 250 FOR IP): Performed by: STUDENT IN AN ORGANIZED HEALTH CARE EDUCATION/TRAINING PROGRAM

## 2025-07-18 RX ORDER — OXYCODONE AND ACETAMINOPHEN 5; 325 MG/1; MG/1
1 TABLET ORAL NIGHTLY PRN
Qty: 6 TABLET | Refills: 0 | Status: SHIPPED | OUTPATIENT
Start: 2025-07-18 | End: 2025-07-25

## 2025-07-18 RX ORDER — OXYCODONE AND ACETAMINOPHEN 5; 325 MG/1; MG/1
1 TABLET ORAL ONCE
Refills: 0 | Status: COMPLETED | OUTPATIENT
Start: 2025-07-18 | End: 2025-07-18

## 2025-07-18 RX ORDER — MIDODRINE HYDROCHLORIDE 5 MG/1
2.5 TABLET ORAL ONCE
Status: COMPLETED | OUTPATIENT
Start: 2025-07-18 | End: 2025-07-18

## 2025-07-18 RX ORDER — 0.9 % SODIUM CHLORIDE 0.9 %
1000 INTRAVENOUS SOLUTION INTRAVENOUS ONCE
Status: COMPLETED | OUTPATIENT
Start: 2025-07-18 | End: 2025-07-18

## 2025-07-18 RX ORDER — IOPAMIDOL 755 MG/ML
75 INJECTION, SOLUTION INTRAVASCULAR
Status: COMPLETED | OUTPATIENT
Start: 2025-07-18 | End: 2025-07-18

## 2025-07-18 RX ORDER — BACITRACIN ZINC AND POLYMYXIN B SULFATE 500; 1000 [USP'U]/G; [USP'U]/G
OINTMENT TOPICAL
Qty: 1 EACH | Refills: 1 | Status: SHIPPED | OUTPATIENT
Start: 2025-07-18

## 2025-07-18 RX ADMIN — IOPAMIDOL 75 ML: 755 INJECTION, SOLUTION INTRAVENOUS at 15:54

## 2025-07-18 RX ADMIN — SODIUM CHLORIDE 1000 ML: 0.9 INJECTION, SOLUTION INTRAVENOUS at 17:11

## 2025-07-18 RX ADMIN — MIDODRINE HYDROCHLORIDE 2.5 MG: 5 TABLET ORAL at 17:23

## 2025-07-18 RX ADMIN — OXYCODONE AND ACETAMINOPHEN 1 TABLET: 5; 325 TABLET ORAL at 15:12

## 2025-07-18 ASSESSMENT — PAIN DESCRIPTION - ORIENTATION
ORIENTATION: LEFT
ORIENTATION: LEFT

## 2025-07-18 ASSESSMENT — ENCOUNTER SYMPTOMS
SHORTNESS OF BREATH: 0
COUGH: 0
CONSTIPATION: 0
ABDOMINAL PAIN: 0
NAUSEA: 0
BACK PAIN: 0
VOMITING: 0
DIARRHEA: 0

## 2025-07-18 ASSESSMENT — PAIN DESCRIPTION - FREQUENCY: FREQUENCY: CONTINUOUS

## 2025-07-18 ASSESSMENT — PAIN - FUNCTIONAL ASSESSMENT
PAIN_FUNCTIONAL_ASSESSMENT: ACTIVITIES ARE NOT PREVENTED
PAIN_FUNCTIONAL_ASSESSMENT: 0-10

## 2025-07-18 ASSESSMENT — PAIN DESCRIPTION - ONSET: ONSET: ON-GOING

## 2025-07-18 ASSESSMENT — PAIN DESCRIPTION - PAIN TYPE: TYPE: ACUTE PAIN

## 2025-07-18 ASSESSMENT — PAIN DESCRIPTION - LOCATION
LOCATION: RIB CAGE
LOCATION: LEG

## 2025-07-18 ASSESSMENT — PAIN DESCRIPTION - DESCRIPTORS
DESCRIPTORS: ACHING
DESCRIPTORS: ACHING;SORE;SHOOTING;DISCOMFORT

## 2025-07-18 ASSESSMENT — PAIN SCALES - GENERAL
PAINLEVEL_OUTOF10: 10
PAINLEVEL_OUTOF10: 9

## 2025-07-18 NOTE — ED PROVIDER NOTES
[facility] EMERGENCY DEPARTMENT  EMERGENCY DEPARTMENT ENCOUNTER        Pt Name: Doreen Norwood  MRN: [id number]  Birthdate 3/3/1932  Date of evaluation: 7/18/2025  Provider: Layla Lomeli DO  PCP: Zach Dozier MD  Note Started: 2:48 PM EDT 7/18/25    CHIEF COMPLAINT       Chief Complaint   Patient presents with    Dizziness     States she got weak and fainted today, unsure if head trauma, L rib pain, L elbow laceration. +asa       HISTORY OF PRESENT ILLNESS: 1 or more Elements     Limitations to history : None    Doreen Norwood is a 89 y.o. female who presents to the emergency department for evaluation of syncope, lightheadedness/dizziness, and collapse/fall.  Patient reports pain to her left-sided rib region since the fall, as well as a painful laceration/skin tear to her left elbow region.  Patient states this morning around 10 AM she woke up and went to use the bathroom; she gets around with a walker but when she goes to her bathroom she has to set it aside because it will not fit inside the bathroom.  She states she used the restroom, got up and walked towards the door to get her walker and suddenly felt very lightheaded like she might pass out; she states she then went down onto the ground/collapsed.  She does not remember that event, but states she must of only been out for a second because she came to pretty quickly.  She says she had some difficulty getting up off the ground however, she felt very weak.  She crawled to the living room, unsure how long it took her, and then she called for an ambulance.  The patient does live alone.  She states that she has a caregiver/aide who comes periodically but they were not there at this time.  She takes aspirin, no other blood thinning medications.  Patient states she does not think she hit her head because there is no pain there, but does know that she must of hit her elbow on something because of the skin tear.  Patient's  regarding the diagnosis and prognosis.  Questions are answered at this time and they are agreeable with the plan.    CONSULTS:   None       Please see ED course for any additional MDM documentation.       CRITICAL CARE TIME (.cct)     0 minutes            I am the Primary Clinician of Record.    FINAL IMPRESSION      1. Syncope and collapse    2. Orthostatic hypotension    3. Chest wall pain    4. Fall from standing, initial encounter    5. Skin tear of left elbow without complication, initial encounter          DISPOSITION/PLAN     DISPOSITION Decision To Discharge 07/18/2025 06:59:13 PM    Disposition: Discharge to home  Patient condition is stable      PATIENT REFERRED TO:  Zach Dozier MD  [address] [facility]  [address] [place] [address]  [phone]    Call in 3 days  For follow-up, For wound re-check    [facility] Emergency Department  [address] [place] [address]  [phone]  Go to   As needed, If symptoms worsen      DISCHARGE MEDICATIONS:  New Prescriptions    BACITRACIN-POLYMYXIN B (POLYSPORIN) [id number] UNIT/GM OINTMENT    Apply topically 2 times daily.    OXYCODONE-ACETAMINOPHEN (PERCOCET) 5-325 MG PER TABLET    Take 1 tablet by mouth nightly as needed for Pain for up to 7 days. Intended supply: 3 days. Take lowest dose possible to manage pain Max Daily Amount: 1 tablet       DISCONTINUED MEDICATIONS:  Discontinued Medications    No medications on file                   Layla Lomeli D.O.     Emergency Medicine      7/18/2025 7:13 PM      NOTE: This report was transcribed using voice recognition software. Every effort was made to ensure accuracy; however, inadvertent computerized transcription errors may be present           Layla Lomeli DO  07/18/25 1913

## 2025-07-18 NOTE — DISCHARGE INSTRUCTIONS
Please return to the ER for any new or worsening symptoms  If prescribed, please be sure to  your prescriptions from the pharmacy  Please follow-up with Primary care provider as instructed  As discussed, please keep your wound dry for the next 48 hours.  The adhesive Steri-Strips will fall off over time.  Please apply the bacitracin as instructed and keep the area covered with a bandage.      CT HEAD WO CONTRAST   Final Result   No acute intracranial abnormality.      No significant interval changes since the study of August 23, 2023.         CTA PULMONARY W CONTRAST   Final Result   1.  No indication for acute trauma injury to the intrathoracic structures, no   pulmonary contusion.      2.  Acute displaced fractures of bone structures of the chest wall including   right left rib cages/thoracic spine/sternal and structures the shoulders.      3.  No subcutaneous emphysema/pneumomediastinum/pneumothorax.      4.  Fair advanced bilateral pulmonary fibrosis predominant in subpleural   space and towards the lower lung bases.  No superimposed acute pulmonary   parenchymal process.      5.  No acute pulmonary emboli.      6.  Contrast density is limited in the aorta.  No aneurysm formation but   cannot proper evaluate for dissection the thoracic aorta.         XR HIP BILATERAL W AP PELVIS (2 VIEWS)   Final Result   No acute fractures of the pelvic bones.      No acute fractures or dislocation of the right hip joint or of the left hip   joint.

## 2025-07-20 LAB
MICROORGANISM SPEC CULT: ABNORMAL
SPECIMEN DESCRIPTION: ABNORMAL

## 2025-08-26 ENCOUNTER — HOSPITAL ENCOUNTER (OUTPATIENT)
Age: 89
Setting detail: OBSERVATION
Discharge: HOME | End: 2025-08-29
Admitting: INTERNAL MEDICINE
Payer: MEDICARE

## 2025-08-26 ENCOUNTER — APPOINTMENT (OUTPATIENT)
Dept: CT IMAGING | Age: 89
End: 2025-08-26
Payer: MEDICARE

## 2025-08-26 DIAGNOSIS — R29.90 STROKE-LIKE SYMPTOMS: Primary | ICD-10-CM

## 2025-08-26 DIAGNOSIS — R47.1 DYSARTHRIA: ICD-10-CM

## 2025-08-26 DIAGNOSIS — R47.01 APHASIA: ICD-10-CM

## 2025-08-26 DIAGNOSIS — R41.82 ALTERED MENTAL STATUS, UNSPECIFIED ALTERED MENTAL STATUS TYPE: ICD-10-CM

## 2025-08-26 LAB
ALBUMIN SERPL-MCNC: 3.7 G/DL (ref 3.5–5.2)
ALP SERPL-CCNC: 78 U/L (ref 35–104)
ALT SERPL-CCNC: 15 U/L (ref 0–35)
ANION GAP SERPL CALCULATED.3IONS-SCNC: 11 MMOL/L (ref 7–16)
AST SERPL-CCNC: 25 U/L (ref 0–35)
BASOPHILS # BLD: 0.06 K/UL (ref 0–0.2)
BASOPHILS NFR BLD: 1 % (ref 0–2)
BILIRUB SERPL-MCNC: 0.6 MG/DL (ref 0–1.2)
BUN SERPL-MCNC: 22 MG/DL (ref 8–23)
CALCIUM SERPL-MCNC: 8.7 MG/DL (ref 8.8–10.2)
CHLORIDE SERPL-SCNC: 106 MMOL/L (ref 98–107)
CO2 SERPL-SCNC: 25 MMOL/L (ref 22–29)
CREAT SERPL-MCNC: 1.1 MG/DL (ref 0.5–1)
EOSINOPHIL # BLD: 0.12 K/UL (ref 0.05–0.5)
EOSINOPHILS RELATIVE PERCENT: 1 % (ref 0–6)
ERYTHROCYTE [DISTWIDTH] IN BLOOD BY AUTOMATED COUNT: 15 % (ref 11.5–15)
GFR, ESTIMATED: 45 ML/MIN/1.73M2
GLUCOSE BLD-MCNC: 161 MG/DL (ref 74–99)
GLUCOSE SERPL-MCNC: 176 MG/DL (ref 74–99)
HCT VFR BLD AUTO: 37.7 % (ref 34–48)
HGB BLD-MCNC: 12.4 G/DL (ref 11.5–15.5)
IMM GRANULOCYTES # BLD AUTO: 0.05 K/UL (ref 0–0.58)
IMM GRANULOCYTES NFR BLD: 1 % (ref 0–5)
INR PPP: 1.1
LYMPHOCYTES NFR BLD: 1.7 K/UL (ref 1.5–4)
LYMPHOCYTES RELATIVE PERCENT: 17 % (ref 20–42)
MCH RBC QN AUTO: 33.2 PG (ref 26–35)
MCHC RBC AUTO-ENTMCNC: 32.9 G/DL (ref 32–34.5)
MCV RBC AUTO: 100.8 FL (ref 80–99.9)
MONOCYTES NFR BLD: 0.92 K/UL (ref 0.1–0.95)
MONOCYTES NFR BLD: 9 % (ref 2–12)
NEUTROPHILS NFR BLD: 71 % (ref 43–80)
NEUTS SEG NFR BLD: 6.97 K/UL (ref 1.8–7.3)
PARTIAL THROMBOPLASTIN TIME: 26.8 SEC (ref 24.5–35.1)
PLATELET # BLD AUTO: 146 K/UL (ref 130–450)
PMV BLD AUTO: 10.4 FL (ref 7–12)
POTASSIUM SERPL-SCNC: 3.8 MMOL/L (ref 3.5–5.1)
PROT SERPL-MCNC: 6 G/DL (ref 6.4–8.3)
PROTHROMBIN TIME: 11.9 SEC (ref 9.3–12.4)
RBC # BLD AUTO: 3.74 M/UL (ref 3.5–5.5)
SODIUM SERPL-SCNC: 142 MMOL/L (ref 136–145)
WBC OTHER # BLD: 9.8 K/UL (ref 4.5–11.5)

## 2025-08-26 PROCEDURE — 70496 CT ANGIOGRAPHY HEAD: CPT

## 2025-08-26 PROCEDURE — 0042T CT BRAIN PERFUSION: CPT

## 2025-08-26 PROCEDURE — 80053 COMPREHEN METABOLIC PANEL: CPT

## 2025-08-26 PROCEDURE — 93005 ELECTROCARDIOGRAM TRACING: CPT

## 2025-08-26 PROCEDURE — 85025 COMPLETE CBC W/AUTO DIFF WBC: CPT

## 2025-08-26 PROCEDURE — G0378 HOSPITAL OBSERVATION PER HR: HCPCS

## 2025-08-26 PROCEDURE — 99285 EMERGENCY DEPT VISIT HI MDM: CPT

## 2025-08-26 PROCEDURE — 82962 GLUCOSE BLOOD TEST: CPT

## 2025-08-26 PROCEDURE — 70450 CT HEAD/BRAIN W/O DYE: CPT

## 2025-08-26 PROCEDURE — 6360000004 HC RX CONTRAST MEDICATION: Performed by: RADIOLOGY

## 2025-08-26 PROCEDURE — 85610 PROTHROMBIN TIME: CPT

## 2025-08-26 PROCEDURE — 6370000000 HC RX 637 (ALT 250 FOR IP)

## 2025-08-26 PROCEDURE — 85730 THROMBOPLASTIN TIME PARTIAL: CPT

## 2025-08-26 PROCEDURE — 70498 CT ANGIOGRAPHY NECK: CPT

## 2025-08-26 RX ORDER — IOPAMIDOL 755 MG/ML
100 INJECTION, SOLUTION INTRAVASCULAR
Status: COMPLETED | OUTPATIENT
Start: 2025-08-26 | End: 2025-08-26

## 2025-08-26 RX ORDER — SENNOSIDES 8.6 MG
325 CAPSULE ORAL ONCE
Status: COMPLETED | OUTPATIENT
Start: 2025-08-26 | End: 2025-08-26

## 2025-08-26 RX ORDER — CLOPIDOGREL 300 MG/1
300 TABLET, FILM COATED ORAL ONCE
Status: COMPLETED | OUTPATIENT
Start: 2025-08-26 | End: 2025-08-26

## 2025-08-26 RX ADMIN — IOPAMIDOL 100 ML: 755 INJECTION, SOLUTION INTRAVENOUS at 19:35

## 2025-08-26 RX ADMIN — ASPIRIN 325 MG: 325 TABLET, COATED ORAL at 20:22

## 2025-08-26 RX ADMIN — CLOPIDOGREL BISULFATE 300 MG: 300 TABLET, FILM COATED ORAL at 20:22

## 2025-08-27 LAB
ANION GAP SERPL CALCULATED.3IONS-SCNC: 10 MMOL/L (ref 7–16)
BASOPHILS # BLD: 0.05 K/UL (ref 0–0.2)
BASOPHILS NFR BLD: 1 % (ref 0–2)
BUN SERPL-MCNC: 19 MG/DL (ref 8–23)
CALCIUM SERPL-MCNC: 8.8 MG/DL (ref 8.8–10.2)
CHLORIDE SERPL-SCNC: 106 MMOL/L (ref 98–107)
CHOLEST SERPL-MCNC: 126 MG/DL
CO2 SERPL-SCNC: 24 MMOL/L (ref 22–29)
CREAT SERPL-MCNC: 1.1 MG/DL (ref 0.5–1)
EKG ATRIAL RATE: 65 BPM
EKG P AXIS: 50 DEGREES
EKG P-R INTERVAL: 194 MS
EKG Q-T INTERVAL: 414 MS
EKG QRS DURATION: 86 MS
EKG QTC CALCULATION (BAZETT): 430 MS
EKG R AXIS: -15 DEGREES
EKG T AXIS: -3 DEGREES
EKG VENTRICULAR RATE: 65 BPM
EOSINOPHIL # BLD: 0.1 K/UL (ref 0.05–0.5)
EOSINOPHILS RELATIVE PERCENT: 1 % (ref 0–6)
ERYTHROCYTE [DISTWIDTH] IN BLOOD BY AUTOMATED COUNT: 15 % (ref 11.5–15)
GFR, ESTIMATED: 50 ML/MIN/1.73M2
GLUCOSE SERPL-MCNC: 105 MG/DL (ref 74–99)
HBA1C MFR BLD: 5.7 % (ref 4–5.6)
HCT VFR BLD AUTO: 35.9 % (ref 34–48)
HDLC SERPL-MCNC: 68 MG/DL
HGB BLD-MCNC: 11.9 G/DL (ref 11.5–15.5)
IMM GRANULOCYTES # BLD AUTO: 0.04 K/UL (ref 0–0.58)
IMM GRANULOCYTES NFR BLD: 0 % (ref 0–5)
LDLC SERPL CALC-MCNC: 45 MG/DL
LYMPHOCYTES NFR BLD: 1.99 K/UL (ref 1.5–4)
LYMPHOCYTES RELATIVE PERCENT: 22 % (ref 20–42)
MCH RBC QN AUTO: 32.6 PG (ref 26–35)
MCHC RBC AUTO-ENTMCNC: 33.1 G/DL (ref 32–34.5)
MCV RBC AUTO: 98.4 FL (ref 80–99.9)
MONOCYTES NFR BLD: 0.93 K/UL (ref 0.1–0.95)
MONOCYTES NFR BLD: 10 % (ref 2–12)
NEUTROPHILS NFR BLD: 66 % (ref 43–80)
NEUTS SEG NFR BLD: 5.95 K/UL (ref 1.8–7.3)
PLATELET # BLD AUTO: 136 K/UL (ref 130–450)
PMV BLD AUTO: 10.6 FL (ref 7–12)
POTASSIUM SERPL-SCNC: 4.3 MMOL/L (ref 3.5–5.1)
RBC # BLD AUTO: 3.65 M/UL (ref 3.5–5.5)
SODIUM SERPL-SCNC: 139 MMOL/L (ref 136–145)
T4 FREE SERPL-MCNC: 0.9 NG/DL (ref 0.9–1.7)
TRIGL SERPL-MCNC: 63 MG/DL
TSH SERPL DL<=0.05 MIU/L-ACNC: 0.33 UIU/ML (ref 0.27–4.2)
VLDLC SERPL CALC-MCNC: 13 MG/DL
WBC OTHER # BLD: 9.1 K/UL (ref 4.5–11.5)

## 2025-08-27 PROCEDURE — 83036 HEMOGLOBIN GLYCOSYLATED A1C: CPT

## 2025-08-27 PROCEDURE — G0378 HOSPITAL OBSERVATION PER HR: HCPCS

## 2025-08-27 PROCEDURE — 97165 OT EVAL LOW COMPLEX 30 MIN: CPT

## 2025-08-27 PROCEDURE — 84443 ASSAY THYROID STIM HORMONE: CPT

## 2025-08-27 PROCEDURE — 36415 COLL VENOUS BLD VENIPUNCTURE: CPT

## 2025-08-27 PROCEDURE — 80048 BASIC METABOLIC PNL TOTAL CA: CPT

## 2025-08-27 PROCEDURE — 97161 PT EVAL LOW COMPLEX 20 MIN: CPT

## 2025-08-27 PROCEDURE — 93010 ELECTROCARDIOGRAM REPORT: CPT | Performed by: INTERNAL MEDICINE

## 2025-08-27 PROCEDURE — 97530 THERAPEUTIC ACTIVITIES: CPT

## 2025-08-27 PROCEDURE — 85025 COMPLETE CBC W/AUTO DIFF WBC: CPT

## 2025-08-27 PROCEDURE — 6370000000 HC RX 637 (ALT 250 FOR IP): Performed by: INTERNAL MEDICINE

## 2025-08-27 PROCEDURE — 80061 LIPID PANEL: CPT

## 2025-08-27 PROCEDURE — 84439 ASSAY OF FREE THYROXINE: CPT

## 2025-08-27 PROCEDURE — 97535 SELF CARE MNGMENT TRAINING: CPT

## 2025-08-27 RX ORDER — DONEPEZIL HYDROCHLORIDE 5 MG/1
5 TABLET, FILM COATED ORAL NIGHTLY
Status: DISCONTINUED | OUTPATIENT
Start: 2025-08-27 | End: 2025-08-29

## 2025-08-27 RX ORDER — CITALOPRAM HYDROBROMIDE 20 MG/1
40 TABLET ORAL DAILY
Status: DISCONTINUED | OUTPATIENT
Start: 2025-08-27 | End: 2025-08-29 | Stop reason: HOSPADM

## 2025-08-27 RX ORDER — ACETAMINOPHEN 325 MG/1
650 TABLET ORAL EVERY 6 HOURS PRN
Status: DISCONTINUED | OUTPATIENT
Start: 2025-08-27 | End: 2025-08-29 | Stop reason: HOSPADM

## 2025-08-27 RX ORDER — PREDNISONE 5 MG/1
5 TABLET ORAL DAILY
Status: DISCONTINUED | OUTPATIENT
Start: 2025-08-27 | End: 2025-08-29 | Stop reason: HOSPADM

## 2025-08-27 RX ORDER — ATORVASTATIN CALCIUM 20 MG/1
20 TABLET, FILM COATED ORAL NIGHTLY
Status: DISCONTINUED | OUTPATIENT
Start: 2025-08-27 | End: 2025-08-29 | Stop reason: HOSPADM

## 2025-08-27 RX ORDER — LACTOBACILLUS RHAMNOSUS GG 10B CELL
1 CAPSULE ORAL DAILY
Status: DISCONTINUED | OUTPATIENT
Start: 2025-08-27 | End: 2025-08-29 | Stop reason: HOSPADM

## 2025-08-27 RX ORDER — M-VIT,TX,IRON,MINS/CALC/FOLIC 27MG-0.4MG
1 TABLET ORAL DAILY
Status: DISCONTINUED | OUTPATIENT
Start: 2025-08-27 | End: 2025-08-29 | Stop reason: HOSPADM

## 2025-08-27 RX ORDER — SULFAMETHOXAZOLE AND TRIMETHOPRIM 400; 80 MG/1; MG/1
1 TABLET ORAL
Status: DISCONTINUED | OUTPATIENT
Start: 2025-08-27 | End: 2025-08-28

## 2025-08-27 RX ORDER — FAMOTIDINE 20 MG/1
20 TABLET, FILM COATED ORAL DAILY
Status: DISCONTINUED | OUTPATIENT
Start: 2025-08-27 | End: 2025-08-29 | Stop reason: HOSPADM

## 2025-08-27 RX ORDER — DOCUSATE SODIUM 100 MG/1
100 CAPSULE, LIQUID FILLED ORAL NIGHTLY
Status: DISCONTINUED | OUTPATIENT
Start: 2025-08-27 | End: 2025-08-29 | Stop reason: HOSPADM

## 2025-08-27 RX ORDER — ASPIRIN 81 MG/1
81 TABLET, CHEWABLE ORAL DAILY
Status: DISCONTINUED | OUTPATIENT
Start: 2025-08-27 | End: 2025-08-29 | Stop reason: HOSPADM

## 2025-08-27 RX ORDER — VITAMIN B COMPLEX
1000 TABLET ORAL DAILY
Status: DISCONTINUED | OUTPATIENT
Start: 2025-08-27 | End: 2025-08-29 | Stop reason: HOSPADM

## 2025-08-27 RX ORDER — MIDODRINE HYDROCHLORIDE 2.5 MG/1
2.5 TABLET ORAL
Status: DISCONTINUED | OUTPATIENT
Start: 2025-08-27 | End: 2025-08-29 | Stop reason: HOSPADM

## 2025-08-27 RX ADMIN — MIDODRINE HYDROCHLORIDE 2.5 MG: 2.5 TABLET ORAL at 18:45

## 2025-08-27 RX ADMIN — PREDNISONE 5 MG: 5 TABLET ORAL at 10:11

## 2025-08-27 RX ADMIN — ATORVASTATIN CALCIUM 20 MG: 20 TABLET, FILM COATED ORAL at 20:07

## 2025-08-27 RX ADMIN — Medication 1 CAPSULE: at 10:10

## 2025-08-27 RX ADMIN — Medication 1 TABLET: at 10:10

## 2025-08-27 RX ADMIN — DOCUSATE SODIUM 100 MG: 100 CAPSULE, LIQUID FILLED ORAL at 20:07

## 2025-08-27 RX ADMIN — FAMOTIDINE 20 MG: 20 TABLET, FILM COATED ORAL at 10:10

## 2025-08-27 RX ADMIN — ASPIRIN 81 MG: 81 TABLET, CHEWABLE ORAL at 10:10

## 2025-08-27 RX ADMIN — DONEPEZIL HYDROCHLORIDE 5 MG: 5 TABLET, FILM COATED ORAL at 20:08

## 2025-08-27 RX ADMIN — Medication 1000 UNITS: at 10:10

## 2025-08-27 RX ADMIN — MIDODRINE HYDROCHLORIDE 2.5 MG: 2.5 TABLET ORAL at 10:10

## 2025-08-27 RX ADMIN — CITALOPRAM HYDROBROMIDE 40 MG: 20 TABLET ORAL at 10:10

## 2025-08-27 ASSESSMENT — PAIN SCALES - GENERAL: PAINLEVEL_OUTOF10: 0

## 2025-08-28 ENCOUNTER — APPOINTMENT (OUTPATIENT)
Dept: MRI IMAGING | Age: 89
End: 2025-08-28
Payer: MEDICARE

## 2025-08-28 LAB
ANION GAP SERPL CALCULATED.3IONS-SCNC: 10 MMOL/L (ref 7–16)
BASOPHILS # BLD: 0.07 K/UL (ref 0–0.2)
BASOPHILS NFR BLD: 1 % (ref 0–2)
BUN SERPL-MCNC: 17 MG/DL (ref 8–23)
CALCIUM SERPL-MCNC: 8.7 MG/DL (ref 8.8–10.2)
CHLORIDE SERPL-SCNC: 105 MMOL/L (ref 98–107)
CO2 SERPL-SCNC: 25 MMOL/L (ref 22–29)
CREAT SERPL-MCNC: 1 MG/DL (ref 0.5–1)
EOSINOPHIL # BLD: 0.36 K/UL (ref 0.05–0.5)
EOSINOPHILS RELATIVE PERCENT: 4 % (ref 0–6)
ERYTHROCYTE [DISTWIDTH] IN BLOOD BY AUTOMATED COUNT: 15.1 % (ref 11.5–15)
GFR, ESTIMATED: 53 ML/MIN/1.73M2
GLUCOSE SERPL-MCNC: 96 MG/DL (ref 74–99)
HCT VFR BLD AUTO: 37 % (ref 34–48)
HGB BLD-MCNC: 12.6 G/DL (ref 11.5–15.5)
IMM GRANULOCYTES # BLD AUTO: 0.03 K/UL (ref 0–0.58)
IMM GRANULOCYTES NFR BLD: 0 % (ref 0–5)
LYMPHOCYTES NFR BLD: 1.54 K/UL (ref 1.5–4)
LYMPHOCYTES RELATIVE PERCENT: 18 % (ref 20–42)
MCH RBC QN AUTO: 32.9 PG (ref 26–35)
MCHC RBC AUTO-ENTMCNC: 34.1 G/DL (ref 32–34.5)
MCV RBC AUTO: 96.6 FL (ref 80–99.9)
MONOCYTES NFR BLD: 1.02 K/UL (ref 0.1–0.95)
MONOCYTES NFR BLD: 12 % (ref 2–12)
NEUTROPHILS NFR BLD: 66 % (ref 43–80)
NEUTS SEG NFR BLD: 5.76 K/UL (ref 1.8–7.3)
PLATELET, FLUORESCENCE: 129 K/UL (ref 130–450)
PMV BLD AUTO: 10.1 FL (ref 7–12)
POTASSIUM SERPL-SCNC: 4.1 MMOL/L (ref 3.5–5.1)
RBC # BLD AUTO: 3.83 M/UL (ref 3.5–5.5)
SODIUM SERPL-SCNC: 140 MMOL/L (ref 136–145)
WBC OTHER # BLD: 8.8 K/UL (ref 4.5–11.5)

## 2025-08-28 PROCEDURE — 70551 MRI BRAIN STEM W/O DYE: CPT

## 2025-08-28 PROCEDURE — G0378 HOSPITAL OBSERVATION PER HR: HCPCS

## 2025-08-28 PROCEDURE — 85025 COMPLETE CBC W/AUTO DIFF WBC: CPT

## 2025-08-28 PROCEDURE — 6370000000 HC RX 637 (ALT 250 FOR IP): Performed by: INTERNAL MEDICINE

## 2025-08-28 PROCEDURE — 80048 BASIC METABOLIC PNL TOTAL CA: CPT

## 2025-08-28 PROCEDURE — 36415 COLL VENOUS BLD VENIPUNCTURE: CPT

## 2025-08-28 RX ORDER — SULFAMETHOXAZOLE AND TRIMETHOPRIM 800; 160 MG/1; MG/1
1 TABLET ORAL
Status: DISCONTINUED | OUTPATIENT
Start: 2025-08-28 | End: 2025-08-29 | Stop reason: HOSPADM

## 2025-08-28 RX ADMIN — CITALOPRAM HYDROBROMIDE 40 MG: 20 TABLET ORAL at 08:39

## 2025-08-28 RX ADMIN — Medication 1 TABLET: at 08:39

## 2025-08-28 RX ADMIN — FAMOTIDINE 20 MG: 20 TABLET, FILM COATED ORAL at 08:40

## 2025-08-28 RX ADMIN — MIDODRINE HYDROCHLORIDE 2.5 MG: 2.5 TABLET ORAL at 08:38

## 2025-08-28 RX ADMIN — ASPIRIN 81 MG: 81 TABLET, CHEWABLE ORAL at 08:40

## 2025-08-28 RX ADMIN — MIDODRINE HYDROCHLORIDE 2.5 MG: 2.5 TABLET ORAL at 16:23

## 2025-08-28 RX ADMIN — DONEPEZIL HYDROCHLORIDE 5 MG: 5 TABLET, FILM COATED ORAL at 20:42

## 2025-08-28 RX ADMIN — ATORVASTATIN CALCIUM 20 MG: 20 TABLET, FILM COATED ORAL at 20:42

## 2025-08-28 RX ADMIN — SULFAMETHOXAZOLE AND TRIMETHOPRIM 1 TABLET: 800; 160 TABLET ORAL at 10:02

## 2025-08-28 RX ADMIN — MIDODRINE HYDROCHLORIDE 2.5 MG: 2.5 TABLET ORAL at 11:35

## 2025-08-28 RX ADMIN — PREDNISONE 5 MG: 5 TABLET ORAL at 08:40

## 2025-08-28 RX ADMIN — Medication 1000 UNITS: at 08:39

## 2025-08-28 RX ADMIN — DOCUSATE SODIUM 100 MG: 100 CAPSULE, LIQUID FILLED ORAL at 20:42

## 2025-08-28 RX ADMIN — Medication 1 CAPSULE: at 08:38

## 2025-08-28 ASSESSMENT — PAIN SCALES - GENERAL
PAINLEVEL_OUTOF10: 0
PAINLEVEL_OUTOF10: 0

## 2025-08-29 ENCOUNTER — APPOINTMENT (OUTPATIENT)
Dept: NEUROLOGY | Age: 89
End: 2025-08-29
Payer: MEDICARE

## 2025-08-29 VITALS
WEIGHT: 155.65 LBS | HEART RATE: 64 BPM | SYSTOLIC BLOOD PRESSURE: 131 MMHG | HEIGHT: 59 IN | RESPIRATION RATE: 16 BRPM | TEMPERATURE: 98 F | DIASTOLIC BLOOD PRESSURE: 63 MMHG | BODY MASS INDEX: 31.38 KG/M2 | OXYGEN SATURATION: 98 %

## 2025-08-29 DIAGNOSIS — E04.1 THYROID NODULE INCIDENTALLY NOTED ON IMAGING STUDY: Primary | ICD-10-CM

## 2025-08-29 LAB
25(OH)D3 SERPL-MCNC: 37.3 NG/ML (ref 30–100)
ANION GAP SERPL CALCULATED.3IONS-SCNC: 11 MMOL/L (ref 7–16)
BASOPHILS # BLD: 0.07 K/UL (ref 0–0.2)
BASOPHILS NFR BLD: 1 % (ref 0–2)
BUN SERPL-MCNC: 16 MG/DL (ref 8–23)
CALCIUM SERPL-MCNC: 8.9 MG/DL (ref 8.8–10.2)
CHLORIDE SERPL-SCNC: 104 MMOL/L (ref 98–107)
CO2 SERPL-SCNC: 25 MMOL/L (ref 22–29)
CREAT SERPL-MCNC: 1.1 MG/DL (ref 0.5–1)
EOSINOPHIL # BLD: 0.31 K/UL (ref 0.05–0.5)
EOSINOPHILS RELATIVE PERCENT: 4 % (ref 0–6)
ERYTHROCYTE [DISTWIDTH] IN BLOOD BY AUTOMATED COUNT: 15 % (ref 11.5–15)
FOLATE SERPL-MCNC: >40 NG/ML (ref 4.6–34.8)
GFR, ESTIMATED: 48 ML/MIN/1.73M2
GLUCOSE SERPL-MCNC: 84 MG/DL (ref 74–99)
HCT VFR BLD AUTO: 38.3 % (ref 34–48)
HGB BLD-MCNC: 12.9 G/DL (ref 11.5–15.5)
IMM GRANULOCYTES # BLD AUTO: <0.03 K/UL (ref 0–0.58)
IMM GRANULOCYTES NFR BLD: 0 % (ref 0–5)
LYMPHOCYTES NFR BLD: 1.5 K/UL (ref 1.5–4)
LYMPHOCYTES RELATIVE PERCENT: 21 % (ref 20–42)
MCH RBC QN AUTO: 33.2 PG (ref 26–35)
MCHC RBC AUTO-ENTMCNC: 33.7 G/DL (ref 32–34.5)
MCV RBC AUTO: 98.5 FL (ref 80–99.9)
MONOCYTES NFR BLD: 0.96 K/UL (ref 0.1–0.95)
MONOCYTES NFR BLD: 13 % (ref 2–12)
NEUTROPHILS NFR BLD: 61 % (ref 43–80)
NEUTS SEG NFR BLD: 4.39 K/UL (ref 1.8–7.3)
PLATELET, FLUORESCENCE: 133 K/UL (ref 130–450)
PMV BLD AUTO: 10.8 FL (ref 7–12)
POTASSIUM SERPL-SCNC: 4 MMOL/L (ref 3.5–5.1)
RBC # BLD AUTO: 3.89 M/UL (ref 3.5–5.5)
SODIUM SERPL-SCNC: 140 MMOL/L (ref 136–145)
VIT B12 SERPL-MCNC: 962 PG/ML (ref 232–1245)
WBC OTHER # BLD: 7.3 K/UL (ref 4.5–11.5)

## 2025-08-29 PROCEDURE — 82306 VITAMIN D 25 HYDROXY: CPT

## 2025-08-29 PROCEDURE — 36415 COLL VENOUS BLD VENIPUNCTURE: CPT

## 2025-08-29 PROCEDURE — G0378 HOSPITAL OBSERVATION PER HR: HCPCS

## 2025-08-29 PROCEDURE — 86038 ANTINUCLEAR ANTIBODIES: CPT

## 2025-08-29 PROCEDURE — 95819 EEG AWAKE AND ASLEEP: CPT

## 2025-08-29 PROCEDURE — 82746 ASSAY OF FOLIC ACID SERUM: CPT

## 2025-08-29 PROCEDURE — 85025 COMPLETE CBC W/AUTO DIFF WBC: CPT

## 2025-08-29 PROCEDURE — 99232 SBSQ HOSP IP/OBS MODERATE 35: CPT

## 2025-08-29 PROCEDURE — 95816 EEG AWAKE AND DROWSY: CPT | Performed by: PSYCHIATRY & NEUROLOGY

## 2025-08-29 PROCEDURE — 86039 ANTINUCLEAR ANTIBODIES (ANA): CPT

## 2025-08-29 PROCEDURE — 80048 BASIC METABOLIC PNL TOTAL CA: CPT

## 2025-08-29 PROCEDURE — 6370000000 HC RX 637 (ALT 250 FOR IP): Performed by: INTERNAL MEDICINE

## 2025-08-29 PROCEDURE — 82607 VITAMIN B-12: CPT

## 2025-08-29 RX ORDER — DONEPEZIL HYDROCHLORIDE 5 MG/1
10 TABLET, FILM COATED ORAL NIGHTLY
Status: DISCONTINUED | OUTPATIENT
Start: 2025-08-29 | End: 2025-08-29 | Stop reason: HOSPADM

## 2025-08-29 RX ADMIN — PREDNISONE 5 MG: 5 TABLET ORAL at 08:24

## 2025-08-29 RX ADMIN — ASPIRIN 81 MG: 81 TABLET, CHEWABLE ORAL at 08:23

## 2025-08-29 RX ADMIN — Medication 1 TABLET: at 08:23

## 2025-08-29 RX ADMIN — CITALOPRAM HYDROBROMIDE 40 MG: 20 TABLET ORAL at 08:24

## 2025-08-29 RX ADMIN — MIDODRINE HYDROCHLORIDE 2.5 MG: 2.5 TABLET ORAL at 11:40

## 2025-08-29 RX ADMIN — Medication 1000 UNITS: at 08:23

## 2025-08-29 RX ADMIN — Medication 1 CAPSULE: at 08:23

## 2025-08-29 RX ADMIN — FAMOTIDINE 20 MG: 20 TABLET, FILM COATED ORAL at 08:23

## 2025-08-29 RX ADMIN — MIDODRINE HYDROCHLORIDE 2.5 MG: 2.5 TABLET ORAL at 08:23

## 2025-09-02 ENCOUNTER — CARE COORDINATION (OUTPATIENT)
Dept: CARE COORDINATION | Age: 89
End: 2025-09-02

## 2025-09-02 LAB — ANA SER QL IA: NEGATIVE

## 2025-09-05 ENCOUNTER — CARE COORDINATION (OUTPATIENT)
Dept: CARE COORDINATION | Age: 89
End: 2025-09-05

## (undated) DEVICE — Device

## (undated) DEVICE — GOWN,SIRUS,FABRNF,XL,20/CS: Brand: MEDLINE

## (undated) DEVICE — DRILL SYSTEM 7

## (undated) DEVICE — DRIP REDUCTION MANIFOLD

## (undated) DEVICE — INTENDED FOR TISSUE SEPARATION, AND OTHER PROCEDURES THAT REQUIRE A SHARP SURGICAL BLADE TO PUNCTURE OR CUT.: Brand: BARD-PARKER ® STAINLESS STEEL BLADES

## (undated) DEVICE — BANDAGE COMPR W6INXL12FT SMOOTH FOR LIMB EXSANG ESMARCH

## (undated) DEVICE — BANDAGE COMPR W4INXL10YD WHITE/BEIGE E MTRX HK LOOP CLSR

## (undated) DEVICE — GLOVE SURG SZ 75 THK118MIL BLK LTX FREE POLYISOPRENE BEAD

## (undated) DEVICE — SET ORTHO STD STORTSTD2

## (undated) DEVICE — GLOVE ORANGE PI 7 1/2   MSG9075

## (undated) DEVICE — TUBING SUCT 12FR MAL ALUM SHFT FN CAP VENT UNIV CONN W/ OBT

## (undated) DEVICE — ZIMMER® STERILE DISPOSABLE TOURNIQUET CUFF WITH PROTECTIVE SLEEVE AND PLC, DUAL PORT, SINGLE BLADDER, 34 IN. (86 CM)

## (undated) DEVICE — PADDING CAST W6INXL4YD COT LO LINTING WYTEX

## (undated) DEVICE — PATIENT RETURN ELECTRODE, SINGLE-USE, CONTACT QUALITY MONITORING, ADULT, WITH 9FT CORD, FOR PATIENTS WEIGING OVER 33LBS. (15KG): Brand: MEGADYNE

## (undated) DEVICE — SYRINGE MED 10ML LUERLOCK TIP W/O SFTY DISP

## (undated) DEVICE — TOTAL KNEE PK

## (undated) DEVICE — SET ORTHO STD STORTSTD1

## (undated) DEVICE — TOWEL,OR,DSP,ST,BLUE,DLX,10/PK,8PK/CS: Brand: MEDLINE

## (undated) DEVICE — CHLORAPREP 26ML ORANGE

## (undated) DEVICE — PADDING,UNDERCAST,COTTON, 4"X4YD STERILE: Brand: MEDLINE

## (undated) DEVICE — GAUZE,SPONGE,AVANT,4"X4",4PLY,STRL,10/TR: Brand: MEDLINE

## (undated) DEVICE — DRAPE C ARM W41XL74IN UNIV MOB W RUBBERBAND CLP

## (undated) DEVICE — BIT DRL L180MM DIA2.5MM QUIK CPL NONRADIOPAQUE W/O STP

## (undated) DEVICE — 3M™ IOBAN™ 2 ANTIMICROBIAL INCISE DRAPE 6650EZ: Brand: IOBAN™ 2

## (undated) DEVICE — SURGICAL PROCEDURE PACK BASIC

## (undated) DEVICE — DRESSING,GAUZE,XEROFORM,CURAD,5"X9",ST: Brand: CURAD

## (undated) DEVICE — SOLUTION IV IRRIG POUR BRL 0.9% SODIUM CHL 2F7124

## (undated) DEVICE — SOLUTION IV IRRIG WATER 1000ML POUR BRL 2F7114